# Patient Record
Sex: MALE | Race: WHITE | NOT HISPANIC OR LATINO | Employment: OTHER | URBAN - METROPOLITAN AREA
[De-identification: names, ages, dates, MRNs, and addresses within clinical notes are randomized per-mention and may not be internally consistent; named-entity substitution may affect disease eponyms.]

---

## 2021-01-01 ENCOUNTER — APPOINTMENT (EMERGENCY)
Dept: RADIOLOGY | Facility: HOSPITAL | Age: 86
DRG: 071 | End: 2021-01-01
Payer: MEDICARE

## 2021-01-01 ENCOUNTER — APPOINTMENT (INPATIENT)
Dept: RADIOLOGY | Facility: HOSPITAL | Age: 86
DRG: 551 | End: 2021-01-01
Payer: MEDICARE

## 2021-01-01 ENCOUNTER — OFFICE VISIT (OUTPATIENT)
Dept: NEUROSURGERY | Facility: CLINIC | Age: 86
End: 2021-01-01
Payer: MEDICARE

## 2021-01-01 ENCOUNTER — APPOINTMENT (INPATIENT)
Dept: RADIOLOGY | Facility: HOSPITAL | Age: 86
DRG: 071 | End: 2021-01-01
Payer: MEDICARE

## 2021-01-01 ENCOUNTER — TELEPHONE (OUTPATIENT)
Dept: NEUROLOGY | Facility: CLINIC | Age: 86
End: 2021-01-01

## 2021-01-01 ENCOUNTER — HOSPITAL ENCOUNTER (INPATIENT)
Facility: HOSPITAL | Age: 86
LOS: 7 days | Discharge: NURSING, REHAB OR LONG-TERM CARE | DRG: 071 | End: 2021-12-18
Attending: EMERGENCY MEDICINE | Admitting: INTERNAL MEDICINE
Payer: MEDICARE

## 2021-01-01 ENCOUNTER — TELEPHONE (OUTPATIENT)
Dept: GASTROENTEROLOGY | Facility: CLINIC | Age: 86
End: 2021-01-01

## 2021-01-01 ENCOUNTER — HOSPITAL ENCOUNTER (INPATIENT)
Facility: HOSPITAL | Age: 86
LOS: 5 days | Discharge: NURSING, REHAB OR LONG-TERM CARE | DRG: 551 | End: 2021-08-12
Attending: SURGERY | Admitting: SURGERY
Payer: MEDICARE

## 2021-01-01 ENCOUNTER — APPOINTMENT (OUTPATIENT)
Dept: RADIOLOGY | Facility: CLINIC | Age: 86
End: 2021-01-01
Payer: MEDICARE

## 2021-01-01 ENCOUNTER — APPOINTMENT (INPATIENT)
Dept: NEUROLOGY | Facility: CLINIC | Age: 86
DRG: 071 | End: 2021-01-01
Payer: MEDICARE

## 2021-01-01 ENCOUNTER — APPOINTMENT (INPATIENT)
Dept: RADIOLOGY | Facility: HOSPITAL | Age: 86
DRG: 071 | End: 2021-01-01
Attending: STUDENT IN AN ORGANIZED HEALTH CARE EDUCATION/TRAINING PROGRAM
Payer: MEDICARE

## 2021-01-01 ENCOUNTER — HOSPITAL ENCOUNTER (EMERGENCY)
Facility: HOSPITAL | Age: 86
End: 2021-08-07
Attending: EMERGENCY MEDICINE | Admitting: EMERGENCY MEDICINE
Payer: MEDICARE

## 2021-01-01 ENCOUNTER — TELEPHONE (OUTPATIENT)
Dept: NEUROSURGERY | Facility: CLINIC | Age: 86
End: 2021-01-01

## 2021-01-01 ENCOUNTER — HOSPITAL ENCOUNTER (OUTPATIENT)
Dept: RADIOLOGY | Facility: HOSPITAL | Age: 86
Discharge: HOME | End: 2021-08-20
Payer: COMMERCIAL

## 2021-01-01 ENCOUNTER — APPOINTMENT (EMERGENCY)
Dept: RADIOLOGY | Facility: HOSPITAL | Age: 86
End: 2021-01-01
Payer: MEDICARE

## 2021-01-01 VITALS
DIASTOLIC BLOOD PRESSURE: 73 MMHG | HEART RATE: 93 BPM | WEIGHT: 174.7 LBS | SYSTOLIC BLOOD PRESSURE: 155 MMHG | HEIGHT: 71 IN | OXYGEN SATURATION: 95 % | BODY MASS INDEX: 24.46 KG/M2 | RESPIRATION RATE: 18 BRPM | TEMPERATURE: 97.4 F

## 2021-01-01 VITALS
HEART RATE: 71 BPM | WEIGHT: 175 LBS | DIASTOLIC BLOOD PRESSURE: 72 MMHG | RESPIRATION RATE: 16 BRPM | SYSTOLIC BLOOD PRESSURE: 157 MMHG | HEIGHT: 66 IN | TEMPERATURE: 97.7 F | OXYGEN SATURATION: 94 % | BODY MASS INDEX: 28.12 KG/M2

## 2021-01-01 VITALS
DIASTOLIC BLOOD PRESSURE: 78 MMHG | TEMPERATURE: 98.1 F | RESPIRATION RATE: 16 BRPM | BODY MASS INDEX: 28.25 KG/M2 | HEART RATE: 70 BPM | HEIGHT: 66 IN | SYSTOLIC BLOOD PRESSURE: 132 MMHG

## 2021-01-01 VITALS
WEIGHT: 175 LBS | DIASTOLIC BLOOD PRESSURE: 80 MMHG | HEIGHT: 66 IN | TEMPERATURE: 97.8 F | BODY MASS INDEX: 28.12 KG/M2 | RESPIRATION RATE: 16 BRPM | SYSTOLIC BLOOD PRESSURE: 144 MMHG | HEART RATE: 66 BPM

## 2021-01-01 VITALS
SYSTOLIC BLOOD PRESSURE: 170 MMHG | DIASTOLIC BLOOD PRESSURE: 76 MMHG | HEART RATE: 70 BPM | TEMPERATURE: 98 F | HEIGHT: 66 IN | WEIGHT: 175 LBS | RESPIRATION RATE: 18 BRPM | BODY MASS INDEX: 28.12 KG/M2 | OXYGEN SATURATION: 95 %

## 2021-01-01 DIAGNOSIS — K86.2 PANCREATIC CYST: ICD-10-CM

## 2021-01-01 DIAGNOSIS — S12.690D COMPRESSION FRACTURE OF C7 VERTEBRA WITH ROUTINE HEALING, SUBSEQUENT ENCOUNTER: ICD-10-CM

## 2021-01-01 DIAGNOSIS — S12.690A COMPRESSION FRACTURE OF C7 VERTEBRA, INITIAL ENCOUNTER (HCC): ICD-10-CM

## 2021-01-01 DIAGNOSIS — S12.9XXA FRACTURE OF CERVICAL SPINOUS PROCESS (HCC): ICD-10-CM

## 2021-01-01 DIAGNOSIS — S12.401A CLOSED NONDISPLACED FRACTURE OF FIFTH CERVICAL VERTEBRA, UNSPECIFIED FRACTURE MORPHOLOGY, INITIAL ENCOUNTER (HCC): ICD-10-CM
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
## 2021-01-01 DIAGNOSIS — R41.89 COGNITIVE IMPAIRMENT: ICD-10-CM

## 2021-01-01 DIAGNOSIS — R47.81 SLURRED SPEECH: ICD-10-CM

## 2021-01-01 DIAGNOSIS — S12.301D CLOSED NONDISPLACED FRACTURE OF FOURTH CERVICAL VERTEBRA WITH ROUTINE HEALING, UNSPECIFIED FRACTURE MORPHOLOGY, SUBSEQUENT ENCOUNTER: ICD-10-CM

## 2021-01-01 DIAGNOSIS — M25.552 ACUTE PAIN OF LEFT HIP: ICD-10-CM
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
## 2021-01-01 DIAGNOSIS — R91.8 PULMONARY NODULES: ICD-10-CM

## 2021-01-01 DIAGNOSIS — S12.9XXA CERVICAL COMPRESSION FRACTURE (HCC): Primary | ICD-10-CM

## 2021-01-01 DIAGNOSIS — S01.01XA SCALP LACERATION, INITIAL ENCOUNTER: ICD-10-CM

## 2021-01-01 DIAGNOSIS — G93.40 ENCEPHALOPATHY: ICD-10-CM

## 2021-01-01 DIAGNOSIS — S12.390A OTHER CLOSED DISPLACED FRACTURE OF FOURTH CERVICAL VERTEBRA, INITIAL ENCOUNTER (HCC): ICD-10-CM

## 2021-01-01 DIAGNOSIS — S12.401D CLOSED NONDISPLACED FRACTURE OF FIFTH CERVICAL VERTEBRA WITH ROUTINE HEALING, UNSPECIFIED FRACTURE MORPHOLOGY, SUBSEQUENT ENCOUNTER: ICD-10-CM

## 2021-01-01 DIAGNOSIS — S12.301D CLOSED NONDISPLACED FRACTURE OF FOURTH CERVICAL VERTEBRA WITH ROUTINE HEALING, UNSPECIFIED FRACTURE MORPHOLOGY, SUBSEQUENT ENCOUNTER: Primary | ICD-10-CM

## 2021-01-01 DIAGNOSIS — S12.591D OTHER CLOSED NONDISPLACED FRACTURE OF SIXTH CERVICAL VERTEBRA WITH ROUTINE HEALING, SUBSEQUENT ENCOUNTER: ICD-10-CM

## 2021-01-01 DIAGNOSIS — R26.2 AMBULATORY DYSFUNCTION: ICD-10-CM

## 2021-01-01 DIAGNOSIS — S09.90XA INJURY OF HEAD, INITIAL ENCOUNTER: ICD-10-CM

## 2021-01-01 DIAGNOSIS — S12.400A: ICD-10-CM

## 2021-01-01 DIAGNOSIS — N17.9 AKI (ACUTE KIDNEY INJURY) (HCC): Primary | ICD-10-CM

## 2021-01-01 DIAGNOSIS — S12.9XXA: Primary | ICD-10-CM

## 2021-01-01 LAB
2HR DELTA HS TROPONIN: 0 NG/L
4HR DELTA HS TROPONIN: 42 NG/L
ALBUMIN SERPL BCP-MCNC: 2.7 G/DL (ref 3.5–5)
ALBUMIN SERPL BCP-MCNC: 2.8 G/DL (ref 3.5–5)
ALBUMIN SERPL BCP-MCNC: 3 G/DL (ref 3.5–5)
ALP SERPL-CCNC: 71 U/L (ref 46–116)
ALT SERPL W P-5'-P-CCNC: 35 U/L (ref 12–78)
ANION GAP SERPL CALCULATED.3IONS-SCNC: 6 MMOL/L (ref 4–13)
ANION GAP SERPL CALCULATED.3IONS-SCNC: 7 MMOL/L (ref 4–13)
ANION GAP SERPL CALCULATED.3IONS-SCNC: 8 MMOL/L (ref 4–13)
APTT PPP: 29 SECONDS (ref 23–37)
AST SERPL W P-5'-P-CCNC: 39 U/L (ref 5–45)
ATRIAL RATE: 68 BPM
ATRIAL RATE: 77 BPM
ATRIAL RATE: 81 BPM
BACTERIA UR QL AUTO: ABNORMAL /HPF
BASOPHILS # BLD AUTO: 0.04 THOUSANDS/ΜL (ref 0–0.1)
BASOPHILS # BLD AUTO: 0.05 THOUSANDS/ΜL (ref 0–0.1)
BASOPHILS # BLD AUTO: 0.05 THOUSANDS/ΜL (ref 0–0.1)
BASOPHILS # BLD AUTO: 0.07 THOUSANDS/ΜL (ref 0–0.1)
BASOPHILS # BLD AUTO: 0.07 THOUSANDS/ΜL (ref 0–0.1)
BASOPHILS # BLD AUTO: 0.08 THOUSANDS/ΜL (ref 0–0.1)
BASOPHILS NFR BLD AUTO: 1 % (ref 0–1)
BILIRUB SERPL-MCNC: 0.61 MG/DL (ref 0.2–1)
BILIRUB UR QL STRIP: NEGATIVE
BUN SERPL-MCNC: 17 MG/DL (ref 5–25)
BUN SERPL-MCNC: 19 MG/DL (ref 5–25)
BUN SERPL-MCNC: 20 MG/DL (ref 5–25)
BUN SERPL-MCNC: 21 MG/DL (ref 5–25)
BUN SERPL-MCNC: 25 MG/DL (ref 5–25)
BUN SERPL-MCNC: 27 MG/DL (ref 5–25)
CALCIUM ALBUM COR SERPL-MCNC: 10 MG/DL (ref 8.3–10.1)
CALCIUM ALBUM COR SERPL-MCNC: 10.5 MG/DL (ref 8.3–10.1)
CALCIUM ALBUM COR SERPL-MCNC: 10.7 MG/DL (ref 8.3–10.1)
CALCIUM SERPL-MCNC: 10.3 MG/DL (ref 8.3–10.1)
CALCIUM SERPL-MCNC: 8.9 MG/DL (ref 8.3–10.1)
CALCIUM SERPL-MCNC: 9 MG/DL (ref 8.3–10.1)
CALCIUM SERPL-MCNC: 9 MG/DL (ref 8.3–10.1)
CALCIUM SERPL-MCNC: 9.2 MG/DL (ref 8.3–10.1)
CALCIUM SERPL-MCNC: 9.4 MG/DL (ref 8.3–10.1)
CALCIUM SERPL-MCNC: 9.7 MG/DL (ref 8.3–10.1)
CALCIUM SERPL-MCNC: 9.7 MG/DL (ref 8.3–10.1)
CARDIAC TROPONIN I PNL SERPL HS: 13 NG/L
CARDIAC TROPONIN I PNL SERPL HS: 13 NG/L
CARDIAC TROPONIN I PNL SERPL HS: 55 NG/L
CARDIAC TROPONIN I PNL SERPL HS: 56 NG/L (ref 8–18)
CHLORIDE SERPL-SCNC: 104 MMOL/L (ref 100–108)
CHLORIDE SERPL-SCNC: 107 MMOL/L (ref 100–108)
CHLORIDE SERPL-SCNC: 108 MMOL/L (ref 100–108)
CHLORIDE SERPL-SCNC: 109 MMOL/L (ref 100–108)
CHLORIDE SERPL-SCNC: 109 MMOL/L (ref 100–108)
CHLORIDE SERPL-SCNC: 112 MMOL/L (ref 100–108)
CK MB SERPL-MCNC: 1.3 % (ref 0–2.5)
CK MB SERPL-MCNC: 2.3 % (ref 0–2.5)
CK MB SERPL-MCNC: 2.3 NG/ML (ref 0–5)
CK MB SERPL-MCNC: 6.5 NG/ML (ref 0–5)
CK SERPL-CCNC: 171 U/L (ref 39–308)
CK SERPL-CCNC: 286 U/L (ref 39–308)
CLARITY UR: CLEAR
CO2 SERPL-SCNC: 23 MMOL/L (ref 21–32)
CO2 SERPL-SCNC: 23 MMOL/L (ref 21–32)
CO2 SERPL-SCNC: 24 MMOL/L (ref 21–32)
CO2 SERPL-SCNC: 25 MMOL/L (ref 21–32)
CO2 SERPL-SCNC: 25 MMOL/L (ref 21–32)
CO2 SERPL-SCNC: 26 MMOL/L (ref 21–32)
CO2 SERPL-SCNC: 26 MMOL/L (ref 21–32)
CO2 SERPL-SCNC: 27 MMOL/L (ref 21–32)
COLOR UR: YELLOW
COLOR, POC: YELLOW
CREAT SERPL-MCNC: 1.12 MG/DL (ref 0.6–1.3)
CREAT SERPL-MCNC: 1.16 MG/DL (ref 0.6–1.3)
CREAT SERPL-MCNC: 1.17 MG/DL (ref 0.6–1.3)
CREAT SERPL-MCNC: 1.21 MG/DL (ref 0.6–1.3)
CREAT SERPL-MCNC: 1.23 MG/DL (ref 0.6–1.3)
CREAT SERPL-MCNC: 1.24 MG/DL (ref 0.6–1.3)
CREAT SERPL-MCNC: 1.29 MG/DL (ref 0.6–1.3)
CREAT SERPL-MCNC: 1.56 MG/DL (ref 0.6–1.3)
EOSINOPHIL # BLD AUTO: 0.03 THOUSAND/ΜL (ref 0–0.61)
EOSINOPHIL # BLD AUTO: 0.15 THOUSAND/ΜL (ref 0–0.61)
EOSINOPHIL # BLD AUTO: 0.18 THOUSAND/ΜL (ref 0–0.61)
EOSINOPHIL # BLD AUTO: 0.28 THOUSAND/ΜL (ref 0–0.61)
EOSINOPHIL # BLD AUTO: 0.3 THOUSAND/ΜL (ref 0–0.61)
EOSINOPHIL # BLD AUTO: 0.46 THOUSAND/ΜL (ref 0–0.61)
EOSINOPHIL NFR BLD AUTO: 0 % (ref 0–6)
EOSINOPHIL NFR BLD AUTO: 1 % (ref 0–6)
EOSINOPHIL NFR BLD AUTO: 2 % (ref 0–6)
EOSINOPHIL NFR BLD AUTO: 3 % (ref 0–6)
EOSINOPHIL NFR BLD AUTO: 4 % (ref 0–6)
EOSINOPHIL NFR BLD AUTO: 5 % (ref 0–6)
ERYTHROCYTE [DISTWIDTH] IN BLOOD BY AUTOMATED COUNT: 12.2 % (ref 11.6–15.1)
ERYTHROCYTE [DISTWIDTH] IN BLOOD BY AUTOMATED COUNT: 12.3 % (ref 11.6–15.1)
ERYTHROCYTE [DISTWIDTH] IN BLOOD BY AUTOMATED COUNT: 12.4 % (ref 11.6–15.1)
ERYTHROCYTE [DISTWIDTH] IN BLOOD BY AUTOMATED COUNT: 12.7 % (ref 11.6–15.1)
ERYTHROCYTE [DISTWIDTH] IN BLOOD BY AUTOMATED COUNT: 13.1 % (ref 11.6–15.1)
ERYTHROCYTE [DISTWIDTH] IN BLOOD BY AUTOMATED COUNT: 13.2 % (ref 11.6–15.1)
ERYTHROCYTE [DISTWIDTH] IN BLOOD BY AUTOMATED COUNT: 13.2 % (ref 11.6–15.1)
EST. AVERAGE GLUCOSE BLD GHB EST-MCNC: 143 MG/DL
FLUAV RNA RESP QL NAA+PROBE: NEGATIVE
FLUBV RNA RESP QL NAA+PROBE: NEGATIVE
FOLATE SERPL-MCNC: >20 NG/ML (ref 3.1–17.5)
GFR SERPL CREATININE-BSD FRML MDRD: 38 ML/MIN/1.73SQ M
GFR SERPL CREATININE-BSD FRML MDRD: 48 ML/MIN/1.73SQ M
GFR SERPL CREATININE-BSD FRML MDRD: 50 ML/MIN/1.73SQ M
GFR SERPL CREATININE-BSD FRML MDRD: 50 ML/MIN/1.73SQ M
GFR SERPL CREATININE-BSD FRML MDRD: 52 ML/MIN/1.73SQ M
GFR SERPL CREATININE-BSD FRML MDRD: 53 ML/MIN/1.73SQ M
GFR SERPL CREATININE-BSD FRML MDRD: 54 ML/MIN/1.73SQ M
GFR SERPL CREATININE-BSD FRML MDRD: 57 ML/MIN/1.73SQ M
GLUCOSE SERPL-MCNC: 111 MG/DL (ref 65–140)
GLUCOSE SERPL-MCNC: 116 MG/DL (ref 65–140)
GLUCOSE SERPL-MCNC: 117 MG/DL (ref 65–140)
GLUCOSE SERPL-MCNC: 117 MG/DL (ref 65–140)
GLUCOSE SERPL-MCNC: 122 MG/DL (ref 65–140)
GLUCOSE SERPL-MCNC: 122 MG/DL (ref 65–140)
GLUCOSE SERPL-MCNC: 124 MG/DL (ref 65–140)
GLUCOSE SERPL-MCNC: 125 MG/DL (ref 65–140)
GLUCOSE SERPL-MCNC: 130 MG/DL (ref 65–140)
GLUCOSE SERPL-MCNC: 131 MG/DL (ref 65–140)
GLUCOSE SERPL-MCNC: 131 MG/DL (ref 65–140)
GLUCOSE SERPL-MCNC: 139 MG/DL (ref 65–140)
GLUCOSE SERPL-MCNC: 140 MG/DL (ref 65–140)
GLUCOSE SERPL-MCNC: 141 MG/DL (ref 65–140)
GLUCOSE SERPL-MCNC: 142 MG/DL (ref 65–140)
GLUCOSE SERPL-MCNC: 144 MG/DL (ref 65–140)
GLUCOSE SERPL-MCNC: 145 MG/DL (ref 65–140)
GLUCOSE SERPL-MCNC: 147 MG/DL (ref 65–140)
GLUCOSE SERPL-MCNC: 148 MG/DL (ref 65–140)
GLUCOSE SERPL-MCNC: 151 MG/DL (ref 65–140)
GLUCOSE SERPL-MCNC: 152 MG/DL (ref 65–140)
GLUCOSE SERPL-MCNC: 153 MG/DL (ref 65–140)
GLUCOSE SERPL-MCNC: 154 MG/DL (ref 65–140)
GLUCOSE SERPL-MCNC: 157 MG/DL (ref 65–140)
GLUCOSE SERPL-MCNC: 158 MG/DL (ref 65–140)
GLUCOSE SERPL-MCNC: 161 MG/DL (ref 65–140)
GLUCOSE SERPL-MCNC: 162 MG/DL (ref 65–140)
GLUCOSE SERPL-MCNC: 168 MG/DL (ref 65–140)
GLUCOSE SERPL-MCNC: 174 MG/DL (ref 65–140)
GLUCOSE SERPL-MCNC: 175 MG/DL (ref 65–140)
GLUCOSE SERPL-MCNC: 178 MG/DL (ref 65–140)
GLUCOSE SERPL-MCNC: 179 MG/DL (ref 65–140)
GLUCOSE SERPL-MCNC: 181 MG/DL (ref 65–140)
GLUCOSE SERPL-MCNC: 183 MG/DL (ref 65–140)
GLUCOSE SERPL-MCNC: 183 MG/DL (ref 65–140)
GLUCOSE SERPL-MCNC: 199 MG/DL (ref 65–140)
GLUCOSE SERPL-MCNC: 202 MG/DL (ref 65–140)
GLUCOSE SERPL-MCNC: 210 MG/DL (ref 65–140)
GLUCOSE SERPL-MCNC: 212 MG/DL (ref 65–140)
GLUCOSE SERPL-MCNC: 212 MG/DL (ref 65–140)
GLUCOSE SERPL-MCNC: 216 MG/DL (ref 65–140)
GLUCOSE SERPL-MCNC: 222 MG/DL (ref 65–140)
GLUCOSE SERPL-MCNC: 228 MG/DL (ref 65–140)
GLUCOSE SERPL-MCNC: 230 MG/DL (ref 65–140)
GLUCOSE SERPL-MCNC: 231 MG/DL (ref 65–140)
GLUCOSE SERPL-MCNC: 248 MG/DL (ref 65–140)
GLUCOSE SERPL-MCNC: 253 MG/DL (ref 65–140)
GLUCOSE SERPL-MCNC: 88 MG/DL (ref 65–140)
GLUCOSE SERPL-MCNC: 97 MG/DL (ref 65–140)
GLUCOSE SERPL-MCNC: 97 MG/DL (ref 65–140)
GLUCOSE UR STRIP-MCNC: NEGATIVE MG/DL
HBA1C MFR BLD: 6.6 %
HCT VFR BLD AUTO: 30.5 % (ref 36.5–49.3)
HCT VFR BLD AUTO: 30.9 % (ref 36.5–49.3)
HCT VFR BLD AUTO: 32.6 % (ref 36.5–49.3)
HCT VFR BLD AUTO: 34.6 % (ref 36.5–49.3)
HCT VFR BLD AUTO: 34.8 % (ref 36.5–49.3)
HCT VFR BLD AUTO: 37.2 % (ref 36.5–49.3)
HCT VFR BLD AUTO: 38 % (ref 36.5–49.3)
HGB BLD-MCNC: 10.5 G/DL (ref 12–17)
HGB BLD-MCNC: 10.6 G/DL (ref 12–17)
HGB BLD-MCNC: 11.3 G/DL (ref 12–17)
HGB BLD-MCNC: 11.4 G/DL (ref 12–17)
HGB BLD-MCNC: 12.2 G/DL (ref 12–17)
HGB BLD-MCNC: 12.7 G/DL (ref 12–17)
HGB BLD-MCNC: 12.9 G/DL (ref 12–17)
HGB UR QL STRIP.AUTO: ABNORMAL
HYALINE CASTS #/AREA URNS LPF: ABNORMAL /LPF
IMM GRANULOCYTES # BLD AUTO: 0.05 THOUSAND/UL (ref 0–0.2)
IMM GRANULOCYTES # BLD AUTO: 0.05 THOUSAND/UL (ref 0–0.2)
IMM GRANULOCYTES # BLD AUTO: 0.06 THOUSAND/UL (ref 0–0.2)
IMM GRANULOCYTES # BLD AUTO: 0.08 THOUSAND/UL (ref 0–0.2)
IMM GRANULOCYTES # BLD AUTO: 0.08 THOUSAND/UL (ref 0–0.2)
IMM GRANULOCYTES # BLD AUTO: 0.09 THOUSAND/UL (ref 0–0.2)
IMM GRANULOCYTES NFR BLD AUTO: 1 % (ref 0–2)
INR PPP: 0.97 (ref 0.84–1.19)
KETONES UR STRIP-MCNC: NEGATIVE MG/DL
LEUKOCYTE ESTERASE UR QL STRIP: NEGATIVE
LYMPHOCYTES # BLD AUTO: 0.66 THOUSANDS/ΜL (ref 0.6–4.47)
LYMPHOCYTES # BLD AUTO: 0.85 THOUSANDS/ΜL (ref 0.6–4.47)
LYMPHOCYTES # BLD AUTO: 0.99 THOUSANDS/ΜL (ref 0.6–4.47)
LYMPHOCYTES # BLD AUTO: 1.09 THOUSANDS/ΜL (ref 0.6–4.47)
LYMPHOCYTES # BLD AUTO: 1.32 THOUSANDS/ΜL (ref 0.6–4.47)
LYMPHOCYTES # BLD AUTO: 1.39 THOUSANDS/ΜL (ref 0.6–4.47)
LYMPHOCYTES NFR BLD AUTO: 11 % (ref 14–44)
LYMPHOCYTES NFR BLD AUTO: 11 % (ref 14–44)
LYMPHOCYTES NFR BLD AUTO: 12 % (ref 14–44)
LYMPHOCYTES NFR BLD AUTO: 12 % (ref 14–44)
LYMPHOCYTES NFR BLD AUTO: 15 % (ref 14–44)
LYMPHOCYTES NFR BLD AUTO: 5 % (ref 14–44)
MAGNESIUM SERPL-MCNC: 2.2 MG/DL (ref 1.6–2.6)
MAGNESIUM SERPL-MCNC: 2.3 MG/DL (ref 1.6–2.6)
MAGNESIUM SERPL-MCNC: 2.4 MG/DL (ref 1.6–2.6)
MCH RBC QN AUTO: 30.3 PG (ref 26.8–34.3)
MCH RBC QN AUTO: 30.5 PG (ref 26.8–34.3)
MCH RBC QN AUTO: 30.7 PG (ref 26.8–34.3)
MCH RBC QN AUTO: 31 PG (ref 26.8–34.3)
MCH RBC QN AUTO: 31.3 PG (ref 26.8–34.3)
MCH RBC QN AUTO: 31.4 PG (ref 26.8–34.3)
MCH RBC QN AUTO: 32 PG (ref 26.8–34.3)
MCHC RBC AUTO-ENTMCNC: 32.5 G/DL (ref 31.4–37.4)
MCHC RBC AUTO-ENTMCNC: 33.9 G/DL (ref 31.4–37.4)
MCHC RBC AUTO-ENTMCNC: 34.1 G/DL (ref 31.4–37.4)
MCHC RBC AUTO-ENTMCNC: 34.3 G/DL (ref 31.4–37.4)
MCHC RBC AUTO-ENTMCNC: 34.4 G/DL (ref 31.4–37.4)
MCHC RBC AUTO-ENTMCNC: 35 G/DL (ref 31.4–37.4)
MCHC RBC AUTO-ENTMCNC: 35.3 G/DL (ref 31.4–37.4)
MCV RBC AUTO: 87 FL (ref 82–98)
MCV RBC AUTO: 89 FL (ref 82–98)
MCV RBC AUTO: 89 FL (ref 82–98)
MCV RBC AUTO: 91 FL (ref 82–98)
MCV RBC AUTO: 92 FL (ref 82–98)
MCV RBC AUTO: 92 FL (ref 82–98)
MCV RBC AUTO: 95 FL (ref 82–98)
MONOCYTES # BLD AUTO: 0.73 THOUSAND/ΜL (ref 0.17–1.22)
MONOCYTES # BLD AUTO: 0.88 THOUSAND/ΜL (ref 0.17–1.22)
MONOCYTES # BLD AUTO: 0.88 THOUSAND/ΜL (ref 0.17–1.22)
MONOCYTES # BLD AUTO: 0.9 THOUSAND/ΜL (ref 0.17–1.22)
MONOCYTES # BLD AUTO: 0.9 THOUSAND/ΜL (ref 0.17–1.22)
MONOCYTES # BLD AUTO: 0.91 THOUSAND/ΜL (ref 0.17–1.22)
MONOCYTES NFR BLD AUTO: 10 % (ref 4–12)
MONOCYTES NFR BLD AUTO: 12 % (ref 4–12)
MONOCYTES NFR BLD AUTO: 7 % (ref 4–12)
MONOCYTES NFR BLD AUTO: 8 % (ref 4–12)
MONOCYTES NFR BLD AUTO: 9 % (ref 4–12)
MONOCYTES NFR BLD AUTO: 9 % (ref 4–12)
NEUTROPHILS # BLD AUTO: 11.2 THOUSANDS/ΜL (ref 1.85–7.62)
NEUTROPHILS # BLD AUTO: 5.67 THOUSANDS/ΜL (ref 1.85–7.62)
NEUTROPHILS # BLD AUTO: 6.2 THOUSANDS/ΜL (ref 1.85–7.62)
NEUTROPHILS # BLD AUTO: 6.37 THOUSANDS/ΜL (ref 1.85–7.62)
NEUTROPHILS # BLD AUTO: 8.16 THOUSANDS/ΜL (ref 1.85–7.62)
NEUTROPHILS # BLD AUTO: 8.27 THOUSANDS/ΜL (ref 1.85–7.62)
NEUTS SEG NFR BLD AUTO: 68 % (ref 43–75)
NEUTS SEG NFR BLD AUTO: 71 % (ref 43–75)
NEUTS SEG NFR BLD AUTO: 74 % (ref 43–75)
NEUTS SEG NFR BLD AUTO: 76 % (ref 43–75)
NEUTS SEG NFR BLD AUTO: 77 % (ref 43–75)
NEUTS SEG NFR BLD AUTO: 86 % (ref 43–75)
NITRITE UR QL STRIP: NEGATIVE
NON-SQ EPI CELLS URNS QL MICRO: ABNORMAL /HPF
NRBC BLD AUTO-RTO: 0 /100 WBCS
P AXIS: 15 DEGREES
P AXIS: 63 DEGREES
P AXIS: 68 DEGREES
PH UR STRIP.AUTO: 7 [PH] (ref 4.5–8)
PHOSPHATE SERPL-MCNC: 2.1 MG/DL (ref 2.3–4.1)
PHOSPHATE SERPL-MCNC: 2.3 MG/DL (ref 2.3–4.1)
PLATELET # BLD AUTO: 175 THOUSANDS/UL (ref 149–390)
PLATELET # BLD AUTO: 180 THOUSANDS/UL (ref 149–390)
PLATELET # BLD AUTO: 207 THOUSANDS/UL (ref 149–390)
PLATELET # BLD AUTO: 210 THOUSANDS/UL (ref 149–390)
PLATELET # BLD AUTO: 212 THOUSANDS/UL (ref 149–390)
PLATELET # BLD AUTO: 219 THOUSANDS/UL (ref 149–390)
PLATELET # BLD AUTO: 243 THOUSANDS/UL (ref 149–390)
PLATELET # BLD AUTO: 257 THOUSANDS/UL (ref 149–390)
PLATELET # BLD AUTO: 264 THOUSANDS/UL (ref 149–390)
PMV BLD AUTO: 10.1 FL (ref 8.9–12.7)
PMV BLD AUTO: 10.2 FL (ref 8.9–12.7)
PMV BLD AUTO: 10.6 FL (ref 8.9–12.7)
PMV BLD AUTO: 10.6 FL (ref 8.9–12.7)
PMV BLD AUTO: 11.5 FL (ref 8.9–12.7)
PMV BLD AUTO: 9.6 FL (ref 8.9–12.7)
PMV BLD AUTO: 9.6 FL (ref 8.9–12.7)
PMV BLD AUTO: 9.8 FL (ref 8.9–12.7)
PMV BLD AUTO: 9.9 FL (ref 8.9–12.7)
POTASSIUM SERPL-SCNC: 3.1 MMOL/L (ref 3.5–5.3)
POTASSIUM SERPL-SCNC: 3.2 MMOL/L (ref 3.5–5.3)
POTASSIUM SERPL-SCNC: 3.4 MMOL/L (ref 3.5–5.3)
POTASSIUM SERPL-SCNC: 3.6 MMOL/L (ref 3.5–5.3)
POTASSIUM SERPL-SCNC: 3.6 MMOL/L (ref 3.5–5.3)
POTASSIUM SERPL-SCNC: 3.7 MMOL/L (ref 3.5–5.3)
POTASSIUM SERPL-SCNC: 3.9 MMOL/L (ref 3.5–5.3)
POTASSIUM SERPL-SCNC: 4.3 MMOL/L (ref 3.5–5.3)
PR INTERVAL: 228 MS
PR INTERVAL: 240 MS
PR INTERVAL: 250 MS
PROT SERPL-MCNC: 6.2 G/DL (ref 6.4–8.2)
PROT UR STRIP-MCNC: ABNORMAL MG/DL
PROTHROMBIN TIME: 12.7 SECONDS (ref 11.6–14.5)
QRS AXIS: -5 DEGREES
QRS AXIS: 10 DEGREES
QRS AXIS: 4 DEGREES
QRSD INTERVAL: 88 MS
QRSD INTERVAL: 90 MS
QRSD INTERVAL: 92 MS
QT INTERVAL: 364 MS
QT INTERVAL: 368 MS
QT INTERVAL: 398 MS
QTC INTERVAL: 416 MS
QTC INTERVAL: 422 MS
QTC INTERVAL: 423 MS
RBC # BLD AUTO: 3.39 MILLION/UL (ref 3.88–5.62)
RBC # BLD AUTO: 3.44 MILLION/UL (ref 3.88–5.62)
RBC # BLD AUTO: 3.56 MILLION/UL (ref 3.88–5.62)
RBC # BLD AUTO: 3.65 MILLION/UL (ref 3.88–5.62)
RBC # BLD AUTO: 3.97 MILLION/UL (ref 3.88–5.62)
RBC # BLD AUTO: 4.05 MILLION/UL (ref 3.88–5.62)
RBC # BLD AUTO: 4.26 MILLION/UL (ref 3.88–5.62)
RBC #/AREA URNS AUTO: ABNORMAL /HPF
RSV RNA RESP QL NAA+PROBE: NEGATIVE
SARS-COV-2 RNA RESP QL NAA+PROBE: NEGATIVE
SODIUM SERPL-SCNC: 136 MMOL/L (ref 136–145)
SODIUM SERPL-SCNC: 139 MMOL/L (ref 136–145)
SODIUM SERPL-SCNC: 140 MMOL/L (ref 136–145)
SODIUM SERPL-SCNC: 140 MMOL/L (ref 136–145)
SODIUM SERPL-SCNC: 141 MMOL/L (ref 136–145)
SODIUM SERPL-SCNC: 142 MMOL/L (ref 136–145)
SP GR UR STRIP.AUTO: >=1.03 (ref 1–1.03)
T WAVE AXIS: 18 DEGREES
T WAVE AXIS: 36 DEGREES
T WAVE AXIS: 58 DEGREES
TROPONIN I SERPL-MCNC: <0.02 NG/ML
TSH SERPL DL<=0.05 MIU/L-ACNC: 1.73 UIU/ML (ref 0.36–3.74)
TSH SERPL DL<=0.05 MIU/L-ACNC: 2.45 UIU/ML (ref 0.36–3.74)
UROBILINOGEN UR QL STRIP.AUTO: 0.2 E.U./DL
VENTRICULAR RATE: 68 BPM
VENTRICULAR RATE: 77 BPM
VENTRICULAR RATE: 81 BPM
VIT B12 SERPL-MCNC: 1269 PG/ML (ref 100–900)
WBC # BLD AUTO: 10.41 THOUSAND/UL (ref 4.31–10.16)
WBC # BLD AUTO: 10.79 THOUSAND/UL (ref 4.31–10.16)
WBC # BLD AUTO: 12.9 THOUSAND/UL (ref 4.31–10.16)
WBC # BLD AUTO: 7.31 THOUSAND/UL (ref 4.31–10.16)
WBC # BLD AUTO: 7.82 THOUSAND/UL (ref 4.31–10.16)
WBC # BLD AUTO: 8.5 THOUSAND/UL (ref 4.31–10.16)
WBC # BLD AUTO: 9.12 THOUSAND/UL (ref 4.31–10.16)
WBC #/AREA URNS AUTO: ABNORMAL /HPF

## 2021-01-01 PROCEDURE — 99232 SBSQ HOSP IP/OBS MODERATE 35: CPT | Performed by: INTERNAL MEDICINE

## 2021-01-01 PROCEDURE — 0241U HB NFCT DS VIR RESP RNA 4 TRGT: CPT | Performed by: STUDENT IN AN ORGANIZED HEALTH CARE EDUCATION/TRAINING PROGRAM

## 2021-01-01 PROCEDURE — 99213 OFFICE O/P EST LOW 20 MIN: CPT | Performed by: PHYSICIAN ASSISTANT

## 2021-01-01 PROCEDURE — 92610 EVALUATE SWALLOWING FUNCTION: CPT

## 2021-01-01 PROCEDURE — 99232 SBSQ HOSP IP/OBS MODERATE 35: CPT | Performed by: STUDENT IN AN ORGANIZED HEALTH CARE EDUCATION/TRAINING PROGRAM

## 2021-01-01 PROCEDURE — 76770 US EXAM ABDO BACK WALL COMP: CPT

## 2021-01-01 PROCEDURE — 97535 SELF CARE MNGMENT TRAINING: CPT

## 2021-01-01 PROCEDURE — 80053 COMPREHEN METABOLIC PANEL: CPT | Performed by: STUDENT IN AN ORGANIZED HEALTH CARE EDUCATION/TRAINING PROGRAM

## 2021-01-01 PROCEDURE — 99285 EMERGENCY DEPT VISIT HI MDM: CPT | Performed by: PHYSICIAN ASSISTANT

## 2021-01-01 PROCEDURE — 99232 SBSQ HOSP IP/OBS MODERATE 35: CPT | Performed by: NURSE PRACTITIONER

## 2021-01-01 PROCEDURE — 81001 URINALYSIS AUTO W/SCOPE: CPT

## 2021-01-01 PROCEDURE — 82948 REAGENT STRIP/BLOOD GLUCOSE: CPT

## 2021-01-01 PROCEDURE — 71250 CT THORAX DX C-: CPT

## 2021-01-01 PROCEDURE — 99232 SBSQ HOSP IP/OBS MODERATE 35: CPT | Performed by: PHYSICIAN ASSISTANT

## 2021-01-01 PROCEDURE — 83735 ASSAY OF MAGNESIUM: CPT | Performed by: STUDENT IN AN ORGANIZED HEALTH CARE EDUCATION/TRAINING PROGRAM

## 2021-01-01 PROCEDURE — 97112 NEUROMUSCULAR REEDUCATION: CPT

## 2021-01-01 PROCEDURE — 96374 THER/PROPH/DIAG INJ IV PUSH: CPT

## 2021-01-01 PROCEDURE — 97530 THERAPEUTIC ACTIVITIES: CPT

## 2021-01-01 PROCEDURE — 72125 CT NECK SPINE W/O DYE: CPT

## 2021-01-01 PROCEDURE — 85025 COMPLETE CBC W/AUTO DIFF WBC: CPT | Performed by: INTERNAL MEDICINE

## 2021-01-01 PROCEDURE — 82550 ASSAY OF CK (CPK): CPT | Performed by: EMERGENCY MEDICINE

## 2021-01-01 PROCEDURE — 97163 PT EVAL HIGH COMPLEX 45 MIN: CPT

## 2021-01-01 PROCEDURE — 99223 1ST HOSP IP/OBS HIGH 75: CPT | Performed by: INTERNAL MEDICINE

## 2021-01-01 PROCEDURE — 99285 EMERGENCY DEPT VISIT HI MDM: CPT

## 2021-01-01 PROCEDURE — 71046 X-RAY EXAM CHEST 2 VIEWS: CPT

## 2021-01-01 PROCEDURE — 84443 ASSAY THYROID STIM HORMONE: CPT | Performed by: INTERNAL MEDICINE

## 2021-01-01 PROCEDURE — 85730 THROMBOPLASTIN TIME PARTIAL: CPT | Performed by: PHYSICIAN ASSISTANT

## 2021-01-01 PROCEDURE — 99232 SBSQ HOSP IP/OBS MODERATE 35: CPT | Performed by: EMERGENCY MEDICINE

## 2021-01-01 PROCEDURE — 97167 OT EVAL HIGH COMPLEX 60 MIN: CPT

## 2021-01-01 PROCEDURE — 80048 BASIC METABOLIC PNL TOTAL CA: CPT | Performed by: EMERGENCY MEDICINE

## 2021-01-01 PROCEDURE — 97110 THERAPEUTIC EXERCISES: CPT

## 2021-01-01 PROCEDURE — 99239 HOSP IP/OBS DSCHRG MGMT >30: CPT | Performed by: STUDENT IN AN ORGANIZED HEALTH CARE EDUCATION/TRAINING PROGRAM

## 2021-01-01 PROCEDURE — 99222 1ST HOSP IP/OBS MODERATE 55: CPT | Performed by: SURGERY

## 2021-01-01 PROCEDURE — NC001 PR NO CHARGE: Performed by: INTERNAL MEDICINE

## 2021-01-01 PROCEDURE — 95816 EEG AWAKE AND DROWSY: CPT | Performed by: STUDENT IN AN ORGANIZED HEALTH CARE EDUCATION/TRAINING PROGRAM

## 2021-01-01 PROCEDURE — 90715 TDAP VACCINE 7 YRS/> IM: CPT | Performed by: EMERGENCY MEDICINE

## 2021-01-01 PROCEDURE — 95816 EEG AWAKE AND DROWSY: CPT

## 2021-01-01 PROCEDURE — 82746 ASSAY OF FOLIC ACID SERUM: CPT | Performed by: INTERNAL MEDICINE

## 2021-01-01 PROCEDURE — 93010 ELECTROCARDIOGRAM REPORT: CPT | Performed by: INTERNAL MEDICINE

## 2021-01-01 PROCEDURE — 36415 COLL VENOUS BLD VENIPUNCTURE: CPT | Performed by: EMERGENCY MEDICINE

## 2021-01-01 PROCEDURE — 85730 THROMBOPLASTIN TIME PARTIAL: CPT | Performed by: EMERGENCY MEDICINE

## 2021-01-01 PROCEDURE — 93005 ELECTROCARDIOGRAM TRACING: CPT

## 2021-01-01 PROCEDURE — 97116 GAIT TRAINING THERAPY: CPT

## 2021-01-01 PROCEDURE — 96361 HYDRATE IV INFUSION ADD-ON: CPT

## 2021-01-01 PROCEDURE — 99222 1ST HOSP IP/OBS MODERATE 55: CPT | Performed by: PHYSICIAN ASSISTANT

## 2021-01-01 PROCEDURE — 1124F ACP DISCUSS-NO DSCNMKR DOCD: CPT | Performed by: PHYSICIAN ASSISTANT

## 2021-01-01 PROCEDURE — 73552 X-RAY EXAM OF FEMUR 2/>: CPT

## 2021-01-01 PROCEDURE — 84484 ASSAY OF TROPONIN QUANT: CPT | Performed by: PHYSICIAN ASSISTANT

## 2021-01-01 PROCEDURE — 85027 COMPLETE CBC AUTOMATED: CPT | Performed by: STUDENT IN AN ORGANIZED HEALTH CARE EDUCATION/TRAINING PROGRAM

## 2021-01-01 PROCEDURE — 80048 BASIC METABOLIC PNL TOTAL CA: CPT | Performed by: INTERNAL MEDICINE

## 2021-01-01 PROCEDURE — 85610 PROTHROMBIN TIME: CPT | Performed by: EMERGENCY MEDICINE

## 2021-01-01 PROCEDURE — 99223 1ST HOSP IP/OBS HIGH 75: CPT | Performed by: NURSE PRACTITIONER

## 2021-01-01 PROCEDURE — 70450 CT HEAD/BRAIN W/O DYE: CPT

## 2021-01-01 PROCEDURE — 96375 TX/PRO/DX INJ NEW DRUG ADDON: CPT

## 2021-01-01 PROCEDURE — 36415 COLL VENOUS BLD VENIPUNCTURE: CPT | Performed by: PHYSICIAN ASSISTANT

## 2021-01-01 PROCEDURE — 72040 X-RAY EXAM NECK SPINE 2-3 VW: CPT

## 2021-01-01 PROCEDURE — 85610 PROTHROMBIN TIME: CPT | Performed by: PHYSICIAN ASSISTANT

## 2021-01-01 PROCEDURE — 85025 COMPLETE CBC W/AUTO DIFF WBC: CPT | Performed by: EMERGENCY MEDICINE

## 2021-01-01 PROCEDURE — 82553 CREATINE MB FRACTION: CPT | Performed by: PHYSICIAN ASSISTANT

## 2021-01-01 PROCEDURE — 82550 ASSAY OF CK (CPK): CPT | Performed by: PHYSICIAN ASSISTANT

## 2021-01-01 PROCEDURE — 80069 RENAL FUNCTION PANEL: CPT | Performed by: STUDENT IN AN ORGANIZED HEALTH CARE EDUCATION/TRAINING PROGRAM

## 2021-01-01 PROCEDURE — 80048 BASIC METABOLIC PNL TOTAL CA: CPT | Performed by: SURGERY

## 2021-01-01 PROCEDURE — 85049 AUTOMATED PLATELET COUNT: CPT | Performed by: SURGERY

## 2021-01-01 PROCEDURE — 84484 ASSAY OF TROPONIN QUANT: CPT | Performed by: INTERNAL MEDICINE

## 2021-01-01 PROCEDURE — 85025 COMPLETE CBC W/AUTO DIFF WBC: CPT | Performed by: SURGERY

## 2021-01-01 PROCEDURE — 90471 IMMUNIZATION ADMIN: CPT

## 2021-01-01 PROCEDURE — 80048 BASIC METABOLIC PNL TOTAL CA: CPT | Performed by: PHYSICIAN ASSISTANT

## 2021-01-01 PROCEDURE — 85025 COMPLETE CBC W/AUTO DIFF WBC: CPT | Performed by: PHYSICIAN ASSISTANT

## 2021-01-01 PROCEDURE — 99232 SBSQ HOSP IP/OBS MODERATE 35: CPT | Performed by: SURGERY

## 2021-01-01 PROCEDURE — 96376 TX/PRO/DX INJ SAME DRUG ADON: CPT

## 2021-01-01 PROCEDURE — 84484 ASSAY OF TROPONIN QUANT: CPT | Performed by: EMERGENCY MEDICINE

## 2021-01-01 PROCEDURE — 97166 OT EVAL MOD COMPLEX 45 MIN: CPT

## 2021-01-01 PROCEDURE — 72170 X-RAY EXAM OF PELVIS: CPT

## 2021-01-01 PROCEDURE — 82607 VITAMIN B-12: CPT | Performed by: INTERNAL MEDICINE

## 2021-01-01 PROCEDURE — 73502 X-RAY EXAM HIP UNI 2-3 VIEWS: CPT

## 2021-01-01 PROCEDURE — 82553 CREATINE MB FRACTION: CPT | Performed by: EMERGENCY MEDICINE

## 2021-01-01 PROCEDURE — 99285 EMERGENCY DEPT VISIT HI MDM: CPT | Performed by: EMERGENCY MEDICINE

## 2021-01-01 PROCEDURE — 85049 AUTOMATED PLATELET COUNT: CPT | Performed by: INTERNAL MEDICINE

## 2021-01-01 PROCEDURE — 83036 HEMOGLOBIN GLYCOSYLATED A1C: CPT | Performed by: INTERNAL MEDICINE

## 2021-01-01 RX ORDER — ONDANSETRON 2 MG/ML
4 INJECTION INTRAMUSCULAR; INTRAVENOUS ONCE
Status: COMPLETED | OUTPATIENT
Start: 2021-01-01 | End: 2021-01-01

## 2021-01-01 RX ORDER — METOPROLOL TARTRATE 50 MG/1
50 TABLET, FILM COATED ORAL EVERY 12 HOURS SCHEDULED
Status: DISCONTINUED | OUTPATIENT
Start: 2021-01-01 | End: 2021-01-01 | Stop reason: HOSPADM

## 2021-01-01 RX ORDER — POTASSIUM CHLORIDE 20MEQ/15ML
40 LIQUID (ML) ORAL ONCE
Status: COMPLETED | OUTPATIENT
Start: 2021-01-01 | End: 2021-01-01

## 2021-01-01 RX ORDER — ONDANSETRON 2 MG/ML
4 INJECTION INTRAMUSCULAR; INTRAVENOUS EVERY 6 HOURS PRN
Status: DISCONTINUED | OUTPATIENT
Start: 2021-01-01 | End: 2021-01-01 | Stop reason: HOSPADM

## 2021-01-01 RX ORDER — ONDANSETRON 2 MG/ML
4 INJECTION INTRAMUSCULAR; INTRAVENOUS EVERY 6 HOURS PRN
Status: DISCONTINUED | OUTPATIENT
Start: 2021-01-01 | End: 2021-01-01

## 2021-01-01 RX ORDER — ACETAMINOPHEN 325 MG/1
975 TABLET ORAL ONCE
Status: DISCONTINUED | OUTPATIENT
Start: 2021-01-01 | End: 2021-01-01 | Stop reason: HOSPADM

## 2021-01-01 RX ORDER — HYDRALAZINE HYDROCHLORIDE 20 MG/ML
5 INJECTION INTRAMUSCULAR; INTRAVENOUS EVERY 6 HOURS PRN
Status: DISCONTINUED | OUTPATIENT
Start: 2021-01-01 | End: 2021-01-01 | Stop reason: HOSPADM

## 2021-01-01 RX ORDER — FUROSEMIDE 20 MG/1
20 TABLET ORAL DAILY
COMMUNITY
End: 2022-01-01 | Stop reason: CLARIF

## 2021-01-01 RX ORDER — LANOLIN ALCOHOL/MO/W.PET/CERES
6 CREAM (GRAM) TOPICAL
Qty: 60 TABLET | Refills: 0
Start: 2021-01-01 | End: 2022-01-01

## 2021-01-01 RX ORDER — POLYETHYLENE GLYCOL 3350 17 G/17G
17 POWDER, FOR SOLUTION ORAL DAILY
Status: DISCONTINUED | OUTPATIENT
Start: 2021-01-01 | End: 2021-01-01 | Stop reason: HOSPADM

## 2021-01-01 RX ORDER — FUROSEMIDE 20 MG/1
20 TABLET ORAL DAILY
Status: DISCONTINUED | OUTPATIENT
Start: 2021-01-01 | End: 2021-01-01 | Stop reason: HOSPADM

## 2021-01-01 RX ORDER — AMLODIPINE BESYLATE 10 MG/1
10 TABLET ORAL DAILY
COMMUNITY
Start: 2021-04-26

## 2021-01-01 RX ORDER — LIDOCAINE 50 MG/G
1 PATCH TOPICAL DAILY
Qty: 10 PATCH | Refills: 0
Start: 2021-01-01 | End: 2021-01-01

## 2021-01-01 RX ORDER — IRBESARTAN 150 MG/1
150 TABLET ORAL DAILY
Status: ON HOLD | COMMUNITY
Start: 2021-03-06 | End: 2021-01-01 | Stop reason: ALTCHOICE

## 2021-01-01 RX ORDER — HEPARIN SODIUM 5000 [USP'U]/ML
5000 INJECTION, SOLUTION INTRAVENOUS; SUBCUTANEOUS EVERY 8 HOURS SCHEDULED
Status: DISCONTINUED | OUTPATIENT
Start: 2021-01-01 | End: 2021-01-01 | Stop reason: HOSPADM

## 2021-01-01 RX ORDER — HYDROMORPHONE HCL/PF 1 MG/ML
0.5 SYRINGE (ML) INJECTION ONCE
Status: COMPLETED | OUTPATIENT
Start: 2021-01-01 | End: 2021-01-01

## 2021-01-01 RX ORDER — LOSARTAN POTASSIUM 50 MG/1
50 TABLET ORAL DAILY
Status: DISCONTINUED | OUTPATIENT
Start: 2021-01-01 | End: 2021-01-01 | Stop reason: HOSPADM

## 2021-01-01 RX ORDER — OXYCODONE HYDROCHLORIDE 5 MG/1
2.5 TABLET ORAL EVERY 4 HOURS PRN
Status: DISCONTINUED | OUTPATIENT
Start: 2021-01-01 | End: 2021-01-01 | Stop reason: HOSPADM

## 2021-01-01 RX ORDER — SENNOSIDES 8.6 MG
2 TABLET ORAL DAILY
Status: DISCONTINUED | OUTPATIENT
Start: 2021-01-01 | End: 2021-01-01 | Stop reason: HOSPADM

## 2021-01-01 RX ORDER — ONDANSETRON 2 MG/ML
4 INJECTION INTRAMUSCULAR; INTRAVENOUS EVERY 4 HOURS PRN
Status: DISCONTINUED | OUTPATIENT
Start: 2021-01-01 | End: 2021-01-01 | Stop reason: HOSPADM

## 2021-01-01 RX ORDER — ATORVASTATIN CALCIUM 20 MG/1
20 TABLET, FILM COATED ORAL
Status: DISCONTINUED | OUTPATIENT
Start: 2021-01-01 | End: 2021-01-01 | Stop reason: HOSPADM

## 2021-01-01 RX ORDER — TAMSULOSIN HYDROCHLORIDE 0.4 MG/1
CAPSULE ORAL
COMMUNITY
Start: 2021-03-24

## 2021-01-01 RX ORDER — AMLODIPINE BESYLATE 10 MG/1
10 TABLET ORAL DAILY
Status: DISCONTINUED | OUTPATIENT
Start: 2021-01-01 | End: 2021-01-01 | Stop reason: HOSPADM

## 2021-01-01 RX ORDER — OXYCODONE HYDROCHLORIDE 5 MG/1
5 TABLET ORAL EVERY 4 HOURS PRN
Status: DISCONTINUED | OUTPATIENT
Start: 2021-01-01 | End: 2021-01-01 | Stop reason: HOSPADM

## 2021-01-01 RX ORDER — POTASSIUM CHLORIDE 20 MEQ/1
20 TABLET, EXTENDED RELEASE ORAL
Status: DISPENSED | OUTPATIENT
Start: 2021-01-01 | End: 2021-01-01

## 2021-01-01 RX ORDER — METOPROLOL TARTRATE 50 MG/1
50 TABLET, FILM COATED ORAL EVERY 12 HOURS SCHEDULED
COMMUNITY
Start: 2021-03-06

## 2021-01-01 RX ORDER — ONDANSETRON 2 MG/ML
INJECTION INTRAMUSCULAR; INTRAVENOUS
Status: COMPLETED
Start: 2021-01-01 | End: 2021-01-01

## 2021-01-01 RX ORDER — INSULIN GLARGINE 100 [IU]/ML
5 INJECTION, SOLUTION SUBCUTANEOUS
Status: DISCONTINUED | OUTPATIENT
Start: 2021-01-01 | End: 2021-01-01 | Stop reason: HOSPADM

## 2021-01-01 RX ORDER — ATORVASTATIN CALCIUM 20 MG/1
20 TABLET, FILM COATED ORAL DAILY
Status: DISCONTINUED | OUTPATIENT
Start: 2021-01-01 | End: 2021-01-01 | Stop reason: HOSPADM

## 2021-01-01 RX ORDER — ACETAMINOPHEN 325 MG/1
975 TABLET ORAL EVERY 8 HOURS SCHEDULED
Start: 2021-01-01 | End: 2022-01-01

## 2021-01-01 RX ORDER — HYDROMORPHONE HCL IN WATER/PF 6 MG/30 ML
0.2 PATIENT CONTROLLED ANALGESIA SYRINGE INTRAVENOUS EVERY 4 HOURS PRN
Status: DISCONTINUED | OUTPATIENT
Start: 2021-01-01 | End: 2021-01-01 | Stop reason: HOSPADM

## 2021-01-01 RX ORDER — ACETAMINOPHEN 325 MG/1
975 TABLET ORAL EVERY 8 HOURS SCHEDULED
Status: DISCONTINUED | OUTPATIENT
Start: 2021-01-01 | End: 2021-01-01 | Stop reason: HOSPADM

## 2021-01-01 RX ORDER — LOSARTAN POTASSIUM 50 MG/1
50 TABLET ORAL DAILY
COMMUNITY
End: 2022-01-01

## 2021-01-01 RX ORDER — LIDOCAINE 50 MG/G
1 PATCH TOPICAL DAILY
Status: DISCONTINUED | OUTPATIENT
Start: 2021-01-01 | End: 2021-01-01 | Stop reason: HOSPADM

## 2021-01-01 RX ORDER — HYDRALAZINE HYDROCHLORIDE 20 MG/ML
10 INJECTION INTRAMUSCULAR; INTRAVENOUS EVERY 6 HOURS PRN
Status: DISCONTINUED | OUTPATIENT
Start: 2021-01-01 | End: 2021-01-01 | Stop reason: HOSPADM

## 2021-01-01 RX ORDER — LIDOCAINE HYDROCHLORIDE AND EPINEPHRINE 10; 10 MG/ML; UG/ML
5 INJECTION, SOLUTION INFILTRATION; PERINEURAL ONCE
Status: COMPLETED | OUTPATIENT
Start: 2021-01-01 | End: 2021-01-01

## 2021-01-01 RX ORDER — SENNOSIDES 8.6 MG
17.2 TABLET ORAL DAILY
Start: 2021-01-01 | End: 2022-01-01

## 2021-01-01 RX ORDER — ATORVASTATIN CALCIUM 20 MG/1
20 TABLET, FILM COATED ORAL DAILY
COMMUNITY
Start: 2021-03-31

## 2021-01-01 RX ORDER — TAMSULOSIN HYDROCHLORIDE 0.4 MG/1
0.4 CAPSULE ORAL
Status: DISCONTINUED | OUTPATIENT
Start: 2021-01-01 | End: 2021-01-01 | Stop reason: HOSPADM

## 2021-01-01 RX ORDER — LABETALOL 20 MG/4 ML (5 MG/ML) INTRAVENOUS SYRINGE
10 ONCE
Status: COMPLETED | OUTPATIENT
Start: 2021-01-01 | End: 2021-01-01

## 2021-01-01 RX ORDER — DOCUSATE SODIUM 100 MG/1
100 CAPSULE, LIQUID FILLED ORAL 2 TIMES DAILY
Status: DISCONTINUED | OUTPATIENT
Start: 2021-01-01 | End: 2021-01-01 | Stop reason: HOSPADM

## 2021-01-01 RX ORDER — HYDRALAZINE HYDROCHLORIDE 20 MG/ML
10 INJECTION INTRAMUSCULAR; INTRAVENOUS ONCE
Status: COMPLETED | OUTPATIENT
Start: 2021-01-01 | End: 2021-01-01

## 2021-01-01 RX ORDER — SODIUM CHLORIDE, SODIUM GLUCONATE, SODIUM ACETATE, POTASSIUM CHLORIDE, MAGNESIUM CHLORIDE, SODIUM PHOSPHATE, DIBASIC, AND POTASSIUM PHOSPHATE .53; .5; .37; .037; .03; .012; .00082 G/100ML; G/100ML; G/100ML; G/100ML; G/100ML; G/100ML; G/100ML
75 INJECTION, SOLUTION INTRAVENOUS CONTINUOUS
Status: DISPENSED | OUTPATIENT
Start: 2021-01-01 | End: 2021-01-01

## 2021-01-01 RX ORDER — LANOLIN ALCOHOL/MO/W.PET/CERES
6 CREAM (GRAM) TOPICAL
Status: DISCONTINUED | OUTPATIENT
Start: 2021-01-01 | End: 2021-01-01 | Stop reason: HOSPADM

## 2021-01-01 RX ADMIN — ONDANSETRON 4 MG: 2 INJECTION INTRAMUSCULAR; INTRAVENOUS at 05:01

## 2021-01-01 RX ADMIN — ATORVASTATIN CALCIUM 20 MG: 20 TABLET, FILM COATED ORAL at 19:04

## 2021-01-01 RX ADMIN — HEPARIN SODIUM 5000 UNITS: 5000 INJECTION INTRAVENOUS; SUBCUTANEOUS at 04:21

## 2021-01-01 RX ADMIN — HYDRALAZINE HYDROCHLORIDE 10 MG: 20 INJECTION INTRAMUSCULAR; INTRAVENOUS at 05:01

## 2021-01-01 RX ADMIN — ACETAMINOPHEN 975 MG: 325 TABLET, FILM COATED ORAL at 14:36

## 2021-01-01 RX ADMIN — HEPARIN SODIUM 5000 UNITS: 5000 INJECTION INTRAVENOUS; SUBCUTANEOUS at 13:39

## 2021-01-01 RX ADMIN — METOPROLOL TARTRATE 50 MG: 50 TABLET, FILM COATED ORAL at 21:13

## 2021-01-01 RX ADMIN — HEPARIN SODIUM 5000 UNITS: 5000 INJECTION INTRAVENOUS; SUBCUTANEOUS at 15:11

## 2021-01-01 RX ADMIN — LIDOCAINE 5% 1 PATCH: 700 PATCH TOPICAL at 17:14

## 2021-01-01 RX ADMIN — HEPARIN SODIUM 5000 UNITS: 5000 INJECTION INTRAVENOUS; SUBCUTANEOUS at 13:53

## 2021-01-01 RX ADMIN — HEPARIN SODIUM 5000 UNITS: 5000 INJECTION INTRAVENOUS; SUBCUTANEOUS at 21:13

## 2021-01-01 RX ADMIN — TAMSULOSIN HYDROCHLORIDE 0.4 MG: 0.4 CAPSULE ORAL at 15:59

## 2021-01-01 RX ADMIN — HEPARIN SODIUM 5000 UNITS: 5000 INJECTION INTRAVENOUS; SUBCUTANEOUS at 23:00

## 2021-01-01 RX ADMIN — METOPROLOL TARTRATE 50 MG: 50 TABLET, FILM COATED ORAL at 20:39

## 2021-01-01 RX ADMIN — Medication 6 MG: at 22:48

## 2021-01-01 RX ADMIN — POLYETHYLENE GLYCOL 3350 17 G: 17 POWDER, FOR SOLUTION ORAL at 08:36

## 2021-01-01 RX ADMIN — FUROSEMIDE 20 MG: 20 TABLET ORAL at 09:12

## 2021-01-01 RX ADMIN — ONDANSETRON 4 MG: 2 INJECTION INTRAMUSCULAR; INTRAVENOUS at 13:10

## 2021-01-01 RX ADMIN — ACETAMINOPHEN 975 MG: 325 TABLET, FILM COATED ORAL at 14:03

## 2021-01-01 RX ADMIN — ACETAMINOPHEN 975 MG: 325 TABLET, FILM COATED ORAL at 17:14

## 2021-01-01 RX ADMIN — ATORVASTATIN CALCIUM 20 MG: 20 TABLET, FILM COATED ORAL at 17:48

## 2021-01-01 RX ADMIN — INSULIN LISPRO 1 UNITS: 100 INJECTION, SOLUTION INTRAVENOUS; SUBCUTANEOUS at 15:59

## 2021-01-01 RX ADMIN — METOPROLOL TARTRATE 50 MG: 50 TABLET, FILM COATED ORAL at 21:05

## 2021-01-01 RX ADMIN — ACETAMINOPHEN 975 MG: 325 TABLET, FILM COATED ORAL at 05:31

## 2021-01-01 RX ADMIN — ACETAMINOPHEN 975 MG: 325 TABLET, FILM COATED ORAL at 22:59

## 2021-01-01 RX ADMIN — AMLODIPINE BESYLATE 10 MG: 10 TABLET ORAL at 11:04

## 2021-01-01 RX ADMIN — METOPROLOL TARTRATE 50 MG: 50 TABLET, FILM COATED ORAL at 09:12

## 2021-01-01 RX ADMIN — HEPARIN SODIUM 5000 UNITS: 5000 INJECTION INTRAVENOUS; SUBCUTANEOUS at 14:36

## 2021-01-01 RX ADMIN — ATORVASTATIN CALCIUM 20 MG: 20 TABLET, FILM COATED ORAL at 18:38

## 2021-01-01 RX ADMIN — SODIUM CHLORIDE, SODIUM GLUCONATE, SODIUM ACETATE, POTASSIUM CHLORIDE, MAGNESIUM CHLORIDE, SODIUM PHOSPHATE, DIBASIC, AND POTASSIUM PHOSPHATE 75 ML/HR: .53; .5; .37; .037; .03; .012; .00082 INJECTION, SOLUTION INTRAVENOUS at 23:28

## 2021-01-01 RX ADMIN — LIDOCAINE 5% 1 PATCH: 700 PATCH TOPICAL at 11:58

## 2021-01-01 RX ADMIN — HYDRALAZINE HYDROCHLORIDE 10 MG: 20 INJECTION, SOLUTION INTRAMUSCULAR; INTRAVENOUS at 18:56

## 2021-01-01 RX ADMIN — ACETAMINOPHEN 975 MG: 325 TABLET, FILM COATED ORAL at 23:30

## 2021-01-01 RX ADMIN — METOPROLOL TARTRATE 50 MG: 50 TABLET, FILM COATED ORAL at 21:48

## 2021-01-01 RX ADMIN — ACETAMINOPHEN 975 MG: 325 TABLET, FILM COATED ORAL at 21:24

## 2021-01-01 RX ADMIN — ATORVASTATIN CALCIUM 20 MG: 20 TABLET, FILM COATED ORAL at 17:14

## 2021-01-01 RX ADMIN — FUROSEMIDE 20 MG: 20 TABLET ORAL at 11:58

## 2021-01-01 RX ADMIN — AMLODIPINE BESYLATE 10 MG: 10 TABLET ORAL at 13:14

## 2021-01-01 RX ADMIN — ACETAMINOPHEN 975 MG: 325 TABLET, FILM COATED ORAL at 20:38

## 2021-01-01 RX ADMIN — HEPARIN SODIUM 5000 UNITS: 5000 INJECTION INTRAVENOUS; SUBCUTANEOUS at 22:15

## 2021-01-01 RX ADMIN — POLYETHYLENE GLYCOL 3350 17 G: 17 POWDER, FOR SOLUTION ORAL at 09:09

## 2021-01-01 RX ADMIN — LABETALOL 20 MG/4 ML (5 MG/ML) INTRAVENOUS SYRINGE 10 MG: at 18:12

## 2021-01-01 RX ADMIN — METOPROLOL TARTRATE 50 MG: 50 TABLET, FILM COATED ORAL at 22:48

## 2021-01-01 RX ADMIN — ACETAMINOPHEN 975 MG: 325 TABLET, FILM COATED ORAL at 15:11

## 2021-01-01 RX ADMIN — FUROSEMIDE 20 MG: 20 TABLET ORAL at 09:27

## 2021-01-01 RX ADMIN — HYDROMORPHONE HYDROCHLORIDE 0.5 MG: 1 INJECTION, SOLUTION INTRAMUSCULAR; INTRAVENOUS; SUBCUTANEOUS at 12:15

## 2021-01-01 RX ADMIN — LIDOCAINE 5% 1 PATCH: 700 PATCH TOPICAL at 08:33

## 2021-01-01 RX ADMIN — HEPARIN SODIUM 5000 UNITS: 5000 INJECTION INTRAVENOUS; SUBCUTANEOUS at 20:36

## 2021-01-01 RX ADMIN — DOCUSATE SODIUM 100 MG: 100 CAPSULE ORAL at 09:27

## 2021-01-01 RX ADMIN — INSULIN LISPRO 2 UNITS: 100 INJECTION, SOLUTION INTRAVENOUS; SUBCUTANEOUS at 13:40

## 2021-01-01 RX ADMIN — DOCUSATE SODIUM 100 MG: 100 CAPSULE ORAL at 17:13

## 2021-01-01 RX ADMIN — METOPROLOL TARTRATE 50 MG: 50 TABLET, FILM COATED ORAL at 08:36

## 2021-01-01 RX ADMIN — POLYETHYLENE GLYCOL 3350 17 G: 17 POWDER, FOR SOLUTION ORAL at 09:36

## 2021-01-01 RX ADMIN — SENNOSIDES 17.2 MG: 8.6 TABLET ORAL at 08:36

## 2021-01-01 RX ADMIN — INSULIN LISPRO 1 UNITS: 100 INJECTION, SOLUTION INTRAVENOUS; SUBCUTANEOUS at 21:13

## 2021-01-01 RX ADMIN — INSULIN LISPRO 2 UNITS: 100 INJECTION, SOLUTION INTRAVENOUS; SUBCUTANEOUS at 14:45

## 2021-01-01 RX ADMIN — HEPARIN SODIUM 5000 UNITS: 5000 INJECTION INTRAVENOUS; SUBCUTANEOUS at 17:14

## 2021-01-01 RX ADMIN — HEPARIN SODIUM 5000 UNITS: 5000 INJECTION INTRAVENOUS; SUBCUTANEOUS at 05:57

## 2021-01-01 RX ADMIN — HEPARIN SODIUM 5000 UNITS: 5000 INJECTION INTRAVENOUS; SUBCUTANEOUS at 05:54

## 2021-01-01 RX ADMIN — METOPROLOL TARTRATE 50 MG: 50 TABLET, FILM COATED ORAL at 08:29

## 2021-01-01 RX ADMIN — AMLODIPINE BESYLATE 10 MG: 10 TABLET ORAL at 21:15

## 2021-01-01 RX ADMIN — INSULIN LISPRO 1 UNITS: 100 INJECTION, SOLUTION INTRAVENOUS; SUBCUTANEOUS at 12:34

## 2021-01-01 RX ADMIN — POLYETHYLENE GLYCOL 3350 17 G: 17 POWDER, FOR SOLUTION ORAL at 08:55

## 2021-01-01 RX ADMIN — SENNOSIDES 17.2 MG: 8.6 TABLET ORAL at 09:26

## 2021-01-01 RX ADMIN — AMLODIPINE BESYLATE 10 MG: 10 TABLET ORAL at 09:36

## 2021-01-01 RX ADMIN — LIDOCAINE 5% 1 PATCH: 700 PATCH TOPICAL at 10:33

## 2021-01-01 RX ADMIN — ACETAMINOPHEN 650 MG: 325 TABLET, FILM COATED ORAL at 13:48

## 2021-01-01 RX ADMIN — INSULIN GLARGINE 5 UNITS: 100 INJECTION, SOLUTION SUBCUTANEOUS at 20:34

## 2021-01-01 RX ADMIN — DOCUSATE SODIUM 100 MG: 100 CAPSULE ORAL at 17:19

## 2021-01-01 RX ADMIN — HEPARIN SODIUM 5000 UNITS: 5000 INJECTION INTRAVENOUS; SUBCUTANEOUS at 05:02

## 2021-01-01 RX ADMIN — INSULIN LISPRO 1 UNITS: 100 INJECTION, SOLUTION INTRAVENOUS; SUBCUTANEOUS at 23:11

## 2021-01-01 RX ADMIN — ACETAMINOPHEN 975 MG: 325 TABLET, FILM COATED ORAL at 13:26

## 2021-01-01 RX ADMIN — HEPARIN SODIUM 5000 UNITS: 5000 INJECTION INTRAVENOUS; SUBCUTANEOUS at 05:16

## 2021-01-01 RX ADMIN — HEPARIN SODIUM 5000 UNITS: 5000 INJECTION INTRAVENOUS; SUBCUTANEOUS at 21:07

## 2021-01-01 RX ADMIN — ACETAMINOPHEN 975 MG: 325 TABLET, FILM COATED ORAL at 22:35

## 2021-01-01 RX ADMIN — AMLODIPINE BESYLATE 10 MG: 10 TABLET ORAL at 08:48

## 2021-01-01 RX ADMIN — METOPROLOL TARTRATE 50 MG: 50 TABLET, FILM COATED ORAL at 21:07

## 2021-01-01 RX ADMIN — ACETAMINOPHEN 975 MG: 325 TABLET, FILM COATED ORAL at 21:04

## 2021-01-01 RX ADMIN — HEPARIN SODIUM 5000 UNITS: 5000 INJECTION INTRAVENOUS; SUBCUTANEOUS at 06:55

## 2021-01-01 RX ADMIN — HEPARIN SODIUM 5000 UNITS: 5000 INJECTION INTRAVENOUS; SUBCUTANEOUS at 14:03

## 2021-01-01 RX ADMIN — ACETAMINOPHEN 975 MG: 325 TABLET, FILM COATED ORAL at 21:13

## 2021-01-01 RX ADMIN — AMLODIPINE BESYLATE 10 MG: 10 TABLET ORAL at 09:27

## 2021-01-01 RX ADMIN — INSULIN LISPRO 1 UNITS: 100 INJECTION, SOLUTION INTRAVENOUS; SUBCUTANEOUS at 20:34

## 2021-01-01 RX ADMIN — METOPROLOL TARTRATE 50 MG: 50 TABLET, FILM COATED ORAL at 11:04

## 2021-01-01 RX ADMIN — SODIUM CHLORIDE, SODIUM GLUCONATE, SODIUM ACETATE, POTASSIUM CHLORIDE, MAGNESIUM CHLORIDE, SODIUM PHOSPHATE, DIBASIC, AND POTASSIUM PHOSPHATE 75 ML/HR: .53; .5; .37; .037; .03; .012; .00082 INJECTION, SOLUTION INTRAVENOUS at 11:41

## 2021-01-01 RX ADMIN — ACETAMINOPHEN 975 MG: 325 TABLET, FILM COATED ORAL at 05:57

## 2021-01-01 RX ADMIN — HEPARIN SODIUM 5000 UNITS: 5000 INJECTION INTRAVENOUS; SUBCUTANEOUS at 22:49

## 2021-01-01 RX ADMIN — HEPARIN SODIUM 5000 UNITS: 5000 INJECTION INTRAVENOUS; SUBCUTANEOUS at 13:40

## 2021-01-01 RX ADMIN — SODIUM CHLORIDE, SODIUM GLUCONATE, SODIUM ACETATE, POTASSIUM CHLORIDE, MAGNESIUM CHLORIDE, SODIUM PHOSPHATE, DIBASIC, AND POTASSIUM PHOSPHATE 75 ML/HR: .53; .5; .37; .037; .03; .012; .00082 INJECTION, SOLUTION INTRAVENOUS at 10:57

## 2021-01-01 RX ADMIN — ATORVASTATIN CALCIUM 20 MG: 20 TABLET, FILM COATED ORAL at 15:59

## 2021-01-01 RX ADMIN — INSULIN LISPRO 1 UNITS: 100 INJECTION, SOLUTION INTRAVENOUS; SUBCUTANEOUS at 22:37

## 2021-01-01 RX ADMIN — TAMSULOSIN HYDROCHLORIDE 0.4 MG: 0.4 CAPSULE ORAL at 16:53

## 2021-01-01 RX ADMIN — ONDANSETRON 4 MG: 2 INJECTION INTRAMUSCULAR; INTRAVENOUS at 14:05

## 2021-01-01 RX ADMIN — ATORVASTATIN CALCIUM 20 MG: 20 TABLET, FILM COATED ORAL at 08:36

## 2021-01-01 RX ADMIN — INSULIN GLARGINE 5 UNITS: 100 INJECTION, SOLUTION SUBCUTANEOUS at 21:39

## 2021-01-01 RX ADMIN — TAMSULOSIN HYDROCHLORIDE 0.4 MG: 0.4 CAPSULE ORAL at 19:04

## 2021-01-01 RX ADMIN — METOPROLOL TARTRATE 50 MG: 50 TABLET, FILM COATED ORAL at 22:36

## 2021-01-01 RX ADMIN — AMLODIPINE BESYLATE 10 MG: 10 TABLET ORAL at 11:58

## 2021-01-01 RX ADMIN — LIDOCAINE 5% 1 PATCH: 700 PATCH TOPICAL at 11:05

## 2021-01-01 RX ADMIN — INSULIN LISPRO 1 UNITS: 100 INJECTION, SOLUTION INTRAVENOUS; SUBCUTANEOUS at 11:06

## 2021-01-01 RX ADMIN — METOPROLOL TARTRATE 50 MG: 50 TABLET, FILM COATED ORAL at 09:36

## 2021-01-01 RX ADMIN — AMLODIPINE BESYLATE 10 MG: 10 TABLET ORAL at 08:36

## 2021-01-01 RX ADMIN — METOPROLOL TARTRATE 50 MG: 50 TABLET, FILM COATED ORAL at 08:33

## 2021-01-01 RX ADMIN — METOPROLOL TARTRATE 50 MG: 50 TABLET, FILM COATED ORAL at 21:24

## 2021-01-01 RX ADMIN — LIDOCAINE 5% 1 PATCH: 700 PATCH TOPICAL at 13:16

## 2021-01-01 RX ADMIN — HEPARIN SODIUM 5000 UNITS: 5000 INJECTION INTRAVENOUS; SUBCUTANEOUS at 14:57

## 2021-01-01 RX ADMIN — TAMSULOSIN HYDROCHLORIDE 0.4 MG: 0.4 CAPSULE ORAL at 17:19

## 2021-01-01 RX ADMIN — HEPARIN SODIUM 5000 UNITS: 5000 INJECTION INTRAVENOUS; SUBCUTANEOUS at 22:36

## 2021-01-01 RX ADMIN — INSULIN LISPRO 1 UNITS: 100 INJECTION, SOLUTION INTRAVENOUS; SUBCUTANEOUS at 21:28

## 2021-01-01 RX ADMIN — TAMSULOSIN HYDROCHLORIDE 0.4 MG: 0.4 CAPSULE ORAL at 22:36

## 2021-01-01 RX ADMIN — ACETAMINOPHEN 975 MG: 325 TABLET, FILM COATED ORAL at 14:57

## 2021-01-01 RX ADMIN — ACETAMINOPHEN 975 MG: 325 TABLET, FILM COATED ORAL at 05:58

## 2021-01-01 RX ADMIN — POLYETHYLENE GLYCOL 3350 17 G: 17 POWDER, FOR SOLUTION ORAL at 09:27

## 2021-01-01 RX ADMIN — HEPARIN SODIUM 5000 UNITS: 5000 INJECTION INTRAVENOUS; SUBCUTANEOUS at 21:04

## 2021-01-01 RX ADMIN — DOCUSATE SODIUM 100 MG: 100 CAPSULE ORAL at 09:12

## 2021-01-01 RX ADMIN — AMLODIPINE BESYLATE 10 MG: 10 TABLET ORAL at 08:29

## 2021-01-01 RX ADMIN — LOSARTAN POTASSIUM 50 MG: 50 TABLET, FILM COATED ORAL at 13:15

## 2021-01-01 RX ADMIN — HEPARIN SODIUM 5000 UNITS: 5000 INJECTION INTRAVENOUS; SUBCUTANEOUS at 14:47

## 2021-01-01 RX ADMIN — LOSARTAN POTASSIUM 50 MG: 50 TABLET, FILM COATED ORAL at 08:33

## 2021-01-01 RX ADMIN — AMLODIPINE BESYLATE 10 MG: 10 TABLET ORAL at 08:33

## 2021-01-01 RX ADMIN — TAMSULOSIN HYDROCHLORIDE 0.4 MG: 0.4 CAPSULE ORAL at 18:38

## 2021-01-01 RX ADMIN — INSULIN LISPRO 1 UNITS: 100 INJECTION, SOLUTION INTRAVENOUS; SUBCUTANEOUS at 23:07

## 2021-01-01 RX ADMIN — ATORVASTATIN CALCIUM 20 MG: 20 TABLET, FILM COATED ORAL at 08:55

## 2021-01-01 RX ADMIN — DOCUSATE SODIUM 100 MG: 100 CAPSULE ORAL at 08:36

## 2021-01-01 RX ADMIN — POTASSIUM CHLORIDE 40 MEQ: 20 SOLUTION ORAL at 11:58

## 2021-01-01 RX ADMIN — AMLODIPINE BESYLATE 10 MG: 10 TABLET ORAL at 10:34

## 2021-01-01 RX ADMIN — ONDANSETRON 4 MG: 2 INJECTION INTRAMUSCULAR; INTRAVENOUS at 17:53

## 2021-01-01 RX ADMIN — METOPROLOL TARTRATE 50 MG: 50 TABLET, FILM COATED ORAL at 10:34

## 2021-01-01 RX ADMIN — FUROSEMIDE 20 MG: 20 TABLET ORAL at 08:33

## 2021-01-01 RX ADMIN — ACETAMINOPHEN 975 MG: 325 TABLET, FILM COATED ORAL at 05:16

## 2021-01-01 RX ADMIN — ACETAMINOPHEN 975 MG: 325 TABLET, FILM COATED ORAL at 22:06

## 2021-01-01 RX ADMIN — ACETAMINOPHEN 975 MG: 325 TABLET, FILM COATED ORAL at 22:47

## 2021-01-01 RX ADMIN — ACETAMINOPHEN 975 MG: 325 TABLET, FILM COATED ORAL at 13:40

## 2021-01-01 RX ADMIN — HEPARIN SODIUM 5000 UNITS: 5000 INJECTION INTRAVENOUS; SUBCUTANEOUS at 06:38

## 2021-01-01 RX ADMIN — INSULIN GLARGINE 5 UNITS: 100 INJECTION, SOLUTION SUBCUTANEOUS at 22:49

## 2021-01-01 RX ADMIN — ACETAMINOPHEN 975 MG: 325 TABLET, FILM COATED ORAL at 13:53

## 2021-01-01 RX ADMIN — TAMSULOSIN HYDROCHLORIDE 0.4 MG: 0.4 CAPSULE ORAL at 17:13

## 2021-01-01 RX ADMIN — DOCUSATE SODIUM 100 MG: 100 CAPSULE ORAL at 17:15

## 2021-01-01 RX ADMIN — ONDANSETRON 4 MG: 2 INJECTION INTRAMUSCULAR; INTRAVENOUS at 12:16

## 2021-01-01 RX ADMIN — HEPARIN SODIUM 5000 UNITS: 5000 INJECTION INTRAVENOUS; SUBCUTANEOUS at 13:26

## 2021-01-01 RX ADMIN — ATORVASTATIN CALCIUM 20 MG: 20 TABLET, FILM COATED ORAL at 09:12

## 2021-01-01 RX ADMIN — HEPARIN SODIUM 5000 UNITS: 5000 INJECTION INTRAVENOUS; SUBCUTANEOUS at 05:09

## 2021-01-01 RX ADMIN — SODIUM CHLORIDE 500 ML: 0.9 INJECTION, SOLUTION INTRAVENOUS at 13:20

## 2021-01-01 RX ADMIN — INSULIN LISPRO 1 UNITS: 100 INJECTION, SOLUTION INTRAVENOUS; SUBCUTANEOUS at 08:39

## 2021-01-01 RX ADMIN — INSULIN LISPRO 1 UNITS: 100 INJECTION, SOLUTION INTRAVENOUS; SUBCUTANEOUS at 07:30

## 2021-01-01 RX ADMIN — SENNOSIDES 17.2 MG: 8.6 TABLET ORAL at 09:36

## 2021-01-01 RX ADMIN — INSULIN GLARGINE 5 UNITS: 100 INJECTION, SOLUTION SUBCUTANEOUS at 21:45

## 2021-01-01 RX ADMIN — SODIUM CHLORIDE, SODIUM GLUCONATE, SODIUM ACETATE, POTASSIUM CHLORIDE, MAGNESIUM CHLORIDE, SODIUM PHOSPHATE, DIBASIC, AND POTASSIUM PHOSPHATE 75 ML/HR: .53; .5; .37; .037; .03; .012; .00082 INJECTION, SOLUTION INTRAVENOUS at 10:06

## 2021-01-01 RX ADMIN — ACETAMINOPHEN 975 MG: 325 TABLET, FILM COATED ORAL at 13:14

## 2021-01-01 RX ADMIN — INSULIN LISPRO 2 UNITS: 100 INJECTION, SOLUTION INTRAVENOUS; SUBCUTANEOUS at 21:45

## 2021-01-01 RX ADMIN — SENNOSIDES 17.2 MG: 8.6 TABLET ORAL at 09:12

## 2021-01-01 RX ADMIN — LIDOCAINE HYDROCHLORIDE AND EPINEPHRINE 5 ML: 10; 10 INJECTION, SOLUTION INFILTRATION; PERINEURAL at 13:37

## 2021-01-01 RX ADMIN — ATORVASTATIN CALCIUM 20 MG: 20 TABLET, FILM COATED ORAL at 09:36

## 2021-01-01 RX ADMIN — SODIUM CHLORIDE 500 ML: 0.9 INJECTION, SOLUTION INTRAVENOUS at 13:28

## 2021-01-01 RX ADMIN — ACETAMINOPHEN 975 MG: 325 TABLET, FILM COATED ORAL at 05:09

## 2021-01-01 RX ADMIN — LOSARTAN POTASSIUM 50 MG: 50 TABLET, FILM COATED ORAL at 11:58

## 2021-01-01 RX ADMIN — METOPROLOL TARTRATE 50 MG: 50 TABLET, FILM COATED ORAL at 09:26

## 2021-01-01 RX ADMIN — HEPARIN SODIUM 5000 UNITS: 5000 INJECTION INTRAVENOUS; SUBCUTANEOUS at 13:48

## 2021-01-01 RX ADMIN — METOPROLOL TARTRATE 50 MG: 50 TABLET, FILM COATED ORAL at 11:58

## 2021-01-01 RX ADMIN — POTASSIUM CHLORIDE 20 MEQ: 1500 TABLET, EXTENDED RELEASE ORAL at 10:55

## 2021-01-01 RX ADMIN — LOSARTAN POTASSIUM 50 MG: 50 TABLET, FILM COATED ORAL at 11:04

## 2021-01-01 RX ADMIN — SODIUM CHLORIDE, SODIUM GLUCONATE, SODIUM ACETATE, POTASSIUM CHLORIDE, MAGNESIUM CHLORIDE, SODIUM PHOSPHATE, DIBASIC, AND POTASSIUM PHOSPHATE 75 ML/HR: .53; .5; .37; .037; .03; .012; .00082 INJECTION, SOLUTION INTRAVENOUS at 23:20

## 2021-01-01 RX ADMIN — FUROSEMIDE 20 MG: 20 TABLET ORAL at 13:14

## 2021-01-01 RX ADMIN — TETANUS TOXOID, REDUCED DIPHTHERIA TOXOID AND ACELLULAR PERTUSSIS VACCINE, ADSORBED 0.5 ML: 5; 2.5; 8; 8; 2.5 SUSPENSION INTRAMUSCULAR at 12:08

## 2021-01-01 RX ADMIN — SODIUM CHLORIDE, SODIUM GLUCONATE, SODIUM ACETATE, POTASSIUM CHLORIDE, MAGNESIUM CHLORIDE, SODIUM PHOSPHATE, DIBASIC, AND POTASSIUM PHOSPHATE 75 ML/HR: .53; .5; .37; .037; .03; .012; .00082 INJECTION, SOLUTION INTRAVENOUS at 20:17

## 2021-01-01 RX ADMIN — METOPROLOL TARTRATE 50 MG: 50 TABLET, FILM COATED ORAL at 01:30

## 2021-01-01 RX ADMIN — METOPROLOL TARTRATE 50 MG: 50 TABLET, FILM COATED ORAL at 13:14

## 2021-01-01 RX ADMIN — METOPROLOL TARTRATE 50 MG: 50 TABLET, FILM COATED ORAL at 22:06

## 2021-01-01 RX ADMIN — AMLODIPINE BESYLATE 10 MG: 10 TABLET ORAL at 09:12

## 2021-01-01 RX ADMIN — AMLODIPINE BESYLATE 10 MG: 10 TABLET ORAL at 08:55

## 2021-01-01 RX ADMIN — TAMSULOSIN HYDROCHLORIDE 0.4 MG: 0.4 CAPSULE ORAL at 17:48

## 2021-01-01 RX ADMIN — INSULIN LISPRO 1 UNITS: 100 INJECTION, SOLUTION INTRAVENOUS; SUBCUTANEOUS at 21:23

## 2021-01-01 RX ADMIN — POTASSIUM CHLORIDE 20 MEQ: 1500 TABLET, EXTENDED RELEASE ORAL at 10:56

## 2021-01-01 RX ADMIN — DOCUSATE SODIUM 100 MG: 100 CAPSULE ORAL at 08:55

## 2021-01-01 RX ADMIN — HEPARIN SODIUM 5000 UNITS: 5000 INJECTION INTRAVENOUS; SUBCUTANEOUS at 21:24

## 2021-01-01 RX ADMIN — INSULIN LISPRO 2 UNITS: 100 INJECTION, SOLUTION INTRAVENOUS; SUBCUTANEOUS at 12:40

## 2021-01-01 RX ADMIN — FUROSEMIDE 20 MG: 20 TABLET ORAL at 11:04

## 2021-01-01 RX ADMIN — HYDRALAZINE HYDROCHLORIDE 5 MG: 20 INJECTION INTRAMUSCULAR; INTRAVENOUS at 23:33

## 2021-01-01 RX ADMIN — DOCUSATE SODIUM 100 MG: 100 CAPSULE ORAL at 22:36

## 2021-01-01 RX ADMIN — ACETAMINOPHEN 975 MG: 325 TABLET, FILM COATED ORAL at 04:21

## 2021-01-01 RX ADMIN — INSULIN LISPRO 1 UNITS: 100 INJECTION, SOLUTION INTRAVENOUS; SUBCUTANEOUS at 21:27

## 2021-01-01 RX ADMIN — DOCUSATE SODIUM 100 MG: 100 CAPSULE ORAL at 09:36

## 2021-01-01 RX ADMIN — INSULIN LISPRO 2 UNITS: 100 INJECTION, SOLUTION INTRAVENOUS; SUBCUTANEOUS at 08:49

## 2021-01-01 RX ADMIN — DOCUSATE SODIUM 100 MG: 100 CAPSULE ORAL at 17:00

## 2021-01-01 RX ADMIN — TAMSULOSIN HYDROCHLORIDE 0.4 MG: 0.4 CAPSULE ORAL at 17:15

## 2021-01-01 RX ADMIN — INSULIN GLARGINE 5 UNITS: 100 INJECTION, SOLUTION SUBCUTANEOUS at 23:00

## 2021-01-01 RX ADMIN — ATORVASTATIN CALCIUM 20 MG: 20 TABLET, FILM COATED ORAL at 09:27

## 2021-01-01 RX ADMIN — HEPARIN SODIUM 5000 UNITS: 5000 INJECTION INTRAVENOUS; SUBCUTANEOUS at 22:06

## 2021-01-01 RX ADMIN — TAMSULOSIN HYDROCHLORIDE 0.4 MG: 0.4 CAPSULE ORAL at 17:14

## 2021-01-01 RX ADMIN — LOSARTAN POTASSIUM 50 MG: 50 TABLET, FILM COATED ORAL at 09:26

## 2021-01-01 RX ADMIN — SENNOSIDES 17.2 MG: 8.6 TABLET ORAL at 08:55

## 2021-01-01 RX ADMIN — TBO-FILGRASTIM 480 MCG: 480 INJECTION, SOLUTION SUBCUTANEOUS at 19:12

## 2021-01-01 RX ADMIN — HEPARIN SODIUM 5000 UNITS: 5000 INJECTION INTRAVENOUS; SUBCUTANEOUS at 05:31

## 2021-01-01 RX ADMIN — INSULIN LISPRO 1 UNITS: 100 INJECTION, SOLUTION INTRAVENOUS; SUBCUTANEOUS at 17:22

## 2021-01-01 RX ADMIN — HEPARIN SODIUM 5000 UNITS: 5000 INJECTION INTRAVENOUS; SUBCUTANEOUS at 05:06

## 2021-01-01 RX ADMIN — HEPARIN SODIUM 5000 UNITS: 5000 INJECTION INTRAVENOUS; SUBCUTANEOUS at 05:43

## 2021-01-01 RX ADMIN — METOPROLOL TARTRATE 50 MG: 50 TABLET, FILM COATED ORAL at 08:48

## 2021-01-01 RX ADMIN — HEPARIN SODIUM 5000 UNITS: 5000 INJECTION INTRAVENOUS; SUBCUTANEOUS at 21:39

## 2021-01-01 RX ADMIN — ACETAMINOPHEN 975 MG: 325 TABLET, FILM COATED ORAL at 21:06

## 2021-01-01 RX ADMIN — LOSARTAN POTASSIUM 50 MG: 50 TABLET, FILM COATED ORAL at 09:12

## 2021-01-01 RX ADMIN — METOPROLOL TARTRATE 50 MG: 50 TABLET, FILM COATED ORAL at 08:55

## 2021-02-12 ENCOUNTER — IMMUNIZATIONS (OUTPATIENT)
Dept: FAMILY MEDICINE CLINIC | Facility: HOSPITAL | Age: 86
End: 2021-02-12

## 2021-02-12 DIAGNOSIS — Z23 ENCOUNTER FOR IMMUNIZATION: Primary | ICD-10-CM

## 2021-02-12 PROCEDURE — 91301 SARS-COV-2 / COVID-19 MRNA VACCINE (MODERNA) 100 MCG: CPT

## 2021-02-12 PROCEDURE — 0011A SARS-COV-2 / COVID-19 MRNA VACCINE (MODERNA) 100 MCG: CPT

## 2021-03-11 ENCOUNTER — IMMUNIZATIONS (OUTPATIENT)
Dept: FAMILY MEDICINE CLINIC | Facility: HOSPITAL | Age: 86
End: 2021-03-11

## 2021-03-11 DIAGNOSIS — Z23 ENCOUNTER FOR IMMUNIZATION: Primary | ICD-10-CM

## 2021-03-11 PROCEDURE — 0012A SARS-COV-2 / COVID-19 MRNA VACCINE (MODERNA) 100 MCG: CPT

## 2021-03-11 PROCEDURE — 91301 SARS-COV-2 / COVID-19 MRNA VACCINE (MODERNA) 100 MCG: CPT

## 2021-08-07 PROBLEM — I10 HYPERTENSION: Status: ACTIVE | Noted: 2021-01-01

## 2021-08-07 PROBLEM — S12.300A: Status: ACTIVE | Noted: 2021-01-01

## 2021-08-07 PROBLEM — Z85.46 PERSONAL HISTORY OF PROSTATE CANCER: Status: ACTIVE | Noted: 2021-01-01

## 2021-08-07 PROBLEM — S12.690A COMPRESSION FRACTURE OF C7 VERTEBRA (HCC): Status: ACTIVE | Noted: 2021-01-01
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
## 2021-08-07 PROBLEM — N39.41 URGE INCONTINENCE OF URINE: Status: ACTIVE | Noted: 2021-01-01

## 2021-08-07 NOTE — ED PROVIDER NOTES
History  Chief Complaint   Patient presents with    Neck Pain     pt tripped and hit head on the stove, pt on aspirin  pt c/o neck l shoulder pain    Shoulder Pain     80year-old male, on aspirin, presenting today after he had a trip and fall yesterday while in his kitchen, was not using his cane or walker at that point in time  Here with wife for relays that he does usually need assistance with ambulation with these devices  Patient fell head 1st into the oven striking the top of his head, did not lose consciousness however then fell the ground  Was able to get up afterwards with assistance from wife  Slept in a recliner  He was complaining of chest pain last night as well as neck pain which continued this morning  No longer has chest pain  Does not know any other joint pain other than his neck and upper back pain  He was able to ambulate morning  Denies headache, changes in vision, weakness, numbness paresthesias, nausea, vomiting, chest or abdominal pain, hip or groin pain  Prior to Admission Medications   Prescriptions Last Dose Informant Patient Reported? Taking? amLODIPine (NORVASC) 10 mg tablet 8/6/2021 at Unknown time  Yes Yes   Sig: Take 10 mg by mouth daily   atorvastatin (LIPITOR) 20 mg tablet 8/6/2021 at Unknown time  Yes Yes   Sig: Take 20 mg by mouth daily   irbesartan (AVAPRO) 150 mg tablet 8/6/2021 at Unknown time  Yes Yes   Sig: Take 150 mg by mouth daily   metoprolol tartrate (LOPRESSOR) 50 mg tablet 8/6/2021 at Unknown time  Yes Yes   Sig: Take 50 mg by mouth   sitaGLIPtin (JANUVIA) 25 mg tablet   Yes Yes   Sig: Take 25 mg by mouth daily   tamsulosin (FLOMAX) 0 4 mg 8/6/2021 at Unknown time  Yes Yes   Sig: TAKE 1 CAPSULE BY MOUTH EVERY DAY 1/2 HR  FOLLOWING THE SAME MEAL EACH DAY      Facility-Administered Medications: None       History reviewed  No pertinent past medical history  History reviewed  No pertinent surgical history  History reviewed   No pertinent family history  I have reviewed and agree with the history as documented  E-Cigarette/Vaping     E-Cigarette/Vaping Substances     Social History     Tobacco Use    Smoking status: Not on file   Substance Use Topics    Alcohol use: Not on file    Drug use: Not on file       Review of Systems   Constitutional: Negative  Negative for chills, fever and unexpected weight change  Denies IV drug use     HENT: Negative  Eyes: Negative  Respiratory: Negative  Negative for cough, chest tightness, shortness of breath and wheezing  Cardiovascular: Negative  Negative for chest pain and palpitations  Gastrointestinal: Negative  Negative for abdominal pain, constipation, diarrhea, nausea and vomiting  Genitourinary: Negative  Negative for difficulty urinating, dysuria, flank pain, frequency, hematuria and urgency  Denies numbness, tingling in the groin  Musculoskeletal: Positive for back pain and neck pain  Negative for arthralgias, gait problem, joint swelling, myalgias and neck stiffness  Skin: Negative  Negative for color change  Neurological: Negative  Negative for dizziness, tremors, weakness, light-headedness, numbness and headaches  All other systems reviewed and are negative  Physical Exam  Physical Exam  Vitals and nursing note reviewed  Constitutional:       General: He is not in acute distress  Appearance: He is well-developed  He is not diaphoretic  HENT:      Head: Normocephalic and atraumatic  Right Ear: External ear normal       Left Ear: External ear normal       Nose: Nose normal       Mouth/Throat:      Pharynx: No oropharyngeal exudate  Eyes:      General: No scleral icterus  Right eye: No discharge  Left eye: No discharge  Conjunctiva/sclera: Conjunctivae normal       Pupils: Pupils are equal, round, and reactive to light  Cardiovascular:      Rate and Rhythm: Normal rate and regular rhythm  Pulses: Normal pulses  Heart sounds: Normal heart sounds  No murmur heard  No friction rub  No gallop  Pulmonary:      Effort: Pulmonary effort is normal  No respiratory distress  Breath sounds: Normal breath sounds  No stridor  No wheezing, rhonchi or rales  Comments: spo2 is 97% indicating adequate oxygenation   Chest:      Chest wall: No tenderness  Abdominal:      General: Abdomen is flat  Bowel sounds are normal  There is no distension  Palpations: Abdomen is soft  There is no mass  Tenderness: There is no abdominal tenderness  There is no guarding or rebound  Hernia: No hernia is present  Musculoskeletal:      Cervical back: Normal range of motion and neck supple  Legs:    Lymphadenopathy:      Cervical: No cervical adenopathy  Skin:     General: Skin is warm and dry  Capillary Refill: Capillary refill takes less than 2 seconds  Coloration: Skin is not pale  Findings: No erythema or rash  Neurological:      General: No focal deficit present  Mental Status: He is alert and oriented to person, place, and time  Mental status is at baseline           Vital Signs  ED Triage Vitals   Temperature Pulse Respirations Blood Pressure SpO2   08/07/21 0941 08/07/21 0941 08/07/21 0941 08/07/21 0941 08/07/21 0941   98 °F (36 7 °C) 73 20 (!) 193/90 97 %      Temp Source Heart Rate Source Patient Position - Orthostatic VS BP Location FiO2 (%)   08/07/21 0941 08/07/21 0939 08/07/21 0941 08/07/21 0941 --   Temporal Monitor Lying Left arm       Pain Score       08/07/21 1103       7           Vitals:    08/07/21 1330 08/07/21 1337 08/07/21 1445 08/07/21 1540   BP:  (!) 172/82 (!) 180/80 170/76   Pulse: 76 77 74 70   Patient Position - Orthostatic VS:  Sitting Sitting Sitting         Visual Acuity  Visual Acuity      Most Recent Value   L Pupil Size (mm)  1   R Pupil Size (mm)  1          ED Medications  Medications   HYDROmorphone (DILAUDID) injection 0 5 mg (0 5 mg Intravenous Given 8/7/21 1215)   ondansetron (ZOFRAN) injection 4 mg (4 mg Intravenous Given 8/7/21 1310)   sodium chloride 0 9 % bolus 500 mL (500 mL Intravenous New Bag 8/7/21 1328)   ondansetron (ZOFRAN) injection 4 mg (4 mg Intravenous Given 8/7/21 1405)       Diagnostic Studies  Results Reviewed     Procedure Component Value Units Date/Time    CKMB [[id number]]  (Abnormal) Collected: 08/07/21 1058    Lab Status: Final result Specimen: Blood from Arm, Left Updated: 08/07/21 1240     CK-MB Index 2 3 %      CK-MB 6 5 ng/mL     Protime-INR [[id number]]  (Normal) Collected: 08/07/21 1058    Lab Status: Final result Specimen: Blood from Arm, Left Updated: 08/07/21 1155     Protime 12 7 seconds      INR 0 97    APTT [[id number]]  (Normal) Collected: 08/07/21 1058    Lab Status: Final result Specimen: Blood from Arm, Left Updated: 08/07/21 1155     PTT 29 seconds     Basic metabolic panel [[id number]]  (Abnormal) Collected: 08/07/21 1058    Lab Status: Final result Specimen: Blood from Arm, Left Updated: 08/07/21 1142     Sodium 141 mmol/L      Potassium 4 3 mmol/L      Chloride 107 mmol/L      CO2 26 mmol/L      ANION GAP 8 mmol/L      BUN 19 mg/dL      Creatinine 1 16 mg/dL      Glucose 148 mg/dL      Calcium 9 4 mg/dL      eGFR 54 ml/min/1 73sq m     Narrative:      [place] guidelines for Chronic Kidney Disease (CKD):     Stage 1 with normal or high GFR (GFR > 90 mL/min/1 73 square meters)    Stage 2 Mild CKD (GFR = 60-89 mL/min/1 73 square meters)    Stage 3A Moderate CKD (GFR = 45-59 mL/min/1 73 square meters)    Stage 3B Moderate CKD (GFR = 30-44 mL/min/1 73 square meters)    Stage 4 Severe CKD (GFR = 15-29 mL/min/1 73 square meters)    Stage 5 End Stage CKD (GFR <15 mL/min/1 73 square meters)  Note: GFR calculation is accurate only with a steady state creatinine    CK Total with Reflex CKMB [[id number]]  (Normal) Collected: 08/07/21 1058    Lab Status: Final result Specimen: Blood from Arm, Left Updated: 08/07/21 1142     Total  U/L     Troponin I [[id number]]  (Normal) Collected: 08/07/21 1058    Lab Status: Final result Specimen: Blood from Arm, Left Updated: 08/07/21 1126     Troponin I <0 02 ng/mL     CBC and differential [[id number]]  (Abnormal) Collected: 08/07/21 1058    Lab Status: Final result Specimen: Blood from Arm, Left Updated: 08/07/21 1104     WBC 8 50 Thousand/uL      RBC 4 05 Million/uL      Hemoglobin 12 7 g/dL      Hematocrit 37 2 %      MCV 92 fL      MCH 31 4 pg      MCHC 34 1 g/dL      RDW 12 2 %      MPV 9 8 fL      Platelets 888 Thousands/uL      nRBC 0 /100 WBCs      Neutrophils Relative 74 %      Immat GRANS % 1 %      Lymphocytes Relative 12 %      Monocytes Relative 9 %      Eosinophils Relative 3 %      Basophils Relative 1 %      Neutrophils Absolute 6 37 Thousands/µL      Immature Grans Absolute 0 08 Thousand/uL      Lymphocytes Absolute 0 99 Thousands/µL      Monocytes Absolute 0 73 Thousand/µL      Eosinophils Absolute 0 28 Thousand/µL      Basophils Absolute 0 05 Thousands/µL                  CT head without contrast   Final Result by Tyson Zee DO (08/07 1126)   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  No acute intracranial abnormality  Workstation performed: [id number]         CT cervical spine without contrast   Final Result by Tyson Zee DO (08/07 1136)   1  Minimally displaced fractures involving the spinous processes of the C4 through C7 vertebral bodies  2   Mild irregularity of the anterior, inferior endplate of C7, suspicious for mild compression fracture  No bony retropulsion  3   Severe degenerative changes of the cervical spine, most pronounced C5-C6 as well as the atlantoaxial articulation  Given the history of trauma, consider follow-up MRI of the cervical spine to exclude cord contusion, secondary to hyperflexion injury           Recommend follow-up neurosurgical or orthopedic spine surgery consultation  Consider follow-up MRI of the cervical spine to exclude cord contusion  I personally discussed this study with Lillie Mann on 8/7/2021 at 11:28 AM       Workstation performed: [id number]         CT chest without contrast   Final Result by Lam Zhu DO (08/07 1158)   1  No traumatic thoracic injury  2   Numerous cystic lesions in the pancreas  Correlate for cystic pancreatic neoplasm  For simple cyst(s) 2 cm or greater recommend gastroenterology and/or surgical oncology consult  EUS is likely now warranted  Preferred imaging modality: abdomen MRI    and MRCP with and without IV contrast, or triple phase abdomen CT with IV contrast, or abdomen MRI and MRCP without IV contrast       The recommendations regarding pancreatic findings assumes that patient does not have family history of pancreatic cancer nor have any symptoms potentially attributable to pancreatic cystic lesions (hyperamylasemia, recent-onset diabetes, severe    epigastric pain, weight loss, steatorrhea, or jaundice ) If these conditions are not true, then management should be deferred to judgement of specialists such as gastroenterologists or oncologic surgeons  Recommendations are based on recent consensus    statements on management of pancreatic cystic lesions from [address] [place] [address] Gastro[employer] Association, [employer] of Radiology, the journal Pancreatology, and our own institutional consensus  3   Multiple noncalcified pulmonary nodules bilaterally, largest measuring 10 mm right lower lobe  Differential would include primary versus metastatic neoplasm  Based on current Fleischner Society 2017 Guidelines on incidental pulmonary nodule, either    PET/CT scan evaluation, tissue sampling or short term interval followup non-contrast CT followup (initially in 3 months) may be considered appropriate                    The study was marked in [facility] for immediate notification  Workstation performed: [id number]         CT recon only thoracic spine   Final Result by Sonia Moore DO (08/07 1157)   Degenerative changes of the thoracic spine  No acute fracture or traumatic subluxation  Workstation performed: [id number]                    Procedures  ECG 12 Lead Documentation Only    Date/Time: 8/7/2021 11:23 AM  Performed by: Bryn Ruffin PA-C  Authorized by: Bryn Ruffin PA-C     Indications / Diagnosis:  Weakness, fall, chest pain   ECG reviewed by me, the ED Provider: yes    Patient location:  ED  Interpretation:     Interpretation: normal    Rate:     ECG rate:  77    ECG rate assessment: normal    Rhythm:     Rhythm: sinus rhythm    Ectopy:     Ectopy: none    QRS:     QRS axis:  Normal    QRS intervals:  Normal  Conduction:     Conduction: abnormal      Abnormal conduction: 1st degree    ST segments:     ST segments:  Normal             ED Course  ED Course as of Aug 07 1551   Sat Aug 07, 2021   1149 Transfer order placed, patient and wife updated      [id number] Patient developed nausea and vomiting after Dilaudid administration, patient then given Zofran by the nurse  1400 Patient vomiting, meds ordered nurse aware                                 SBIRT 22yo+      Most Recent Value   SBIRT (24 yo +)   In order to provide better care to our patients, we are screening all of our patients for alcohol and drug use  Would it be okay to ask you these screening questions? No Filed at: 08/07/2021 1338                    MDM  Number of Diagnoses or Management Options  Cervical compression fracture ([facility] [address] )  Fall, initial encounter  Fracture of cervical spinous process [facility])  Pancreatic cyst  Pulmonary nodules  Diagnosis management comments: Discussed case with trauma Dr Shaylee Law who is agreeable with trauma transfer at this point in time  Incidental findings on chest CT noted  Patient remained in cervical collar during stay   Patient developed nausea and vomiting after Dilaudid administration  Amount and/or Complexity of Data Reviewed  Clinical lab tests: ordered and reviewed  Tests in the radiology section of CPT®: reviewed and ordered  Review and summarize past medical records: yes  Discuss the patient with other providers: yes  Independent visualization of images, tracings, or specimens: yes        Disposition  Final diagnoses:   Cervical compression fracture ([address] )   Fracture of cervical spinous process ([address] )   Pulmonary nodules   Pancreatic cyst   Fall, initial encounter     Time reflects when diagnosis was documented in both MDM as applicable and the Disposition within this note     Time User Action Codes Description Comment    8/7/2021 12:00 PM Lizette Gunner Add [S12  9XXA] Cervical compression fracture ([facility] [address] )     8/7/2021 12:00 PM Lizette Gunner Add [S12  9XXA] Fracture of cervical spinous process ([facility] [address] )     8/7/2021 12:00 PM Lizette Gunner Add [R91 8] Pulmonary nodules     8/7/2021 12:00 PM Lizette Gunner Add [K86 2] Pancreatic cyst     8/7/2021 12:00 PM Lizette Gunner Add [T99  Mertie Maxon, initial encounter       ED Disposition     ED Disposition Condition Date/Time Comment    Transfer to Another Facility-In Network  [facility] Aug 7, 2021 12:00 PM [place]inthia Myers [place] [facility]          MD Documentation      Most Recent Value   Patient Condition  The patient has been stabilized such that within reasonable medical probability, no material deterioration of the patient condition or the condition of the unborn child(roberto) is likely to result from the transfer   Reason for Transfer  Level of Care needed not available at this facility   Benefits of Transfer  Specialized equipment and/or services available at the receiving facility (Include comment)________________________ Morales Faye and neurosurgery evaluation]   Risks of Transfer  Potential for delay in receiving treatment, Potential deterioration of medical condition, Loss of IV, Increased discomfort during transfer, Possible worsening of condition or death during transfer, Other: (Include comment)__________________________   Accepting Physician  Dr Deborah Riddle Name, [facility]   Sending MD Claire gaspar [place],  Dr Donna Katz    Provider Certification  General risk, such as traffic hazards, adverse weather conditions, rough terrain or turbulence, possible failure of equipment (including vehicle or aircraft), or consequences of actions of persons outside the control of the transport personnel, Unanticipated needs of medical equipment and personnel during transport, Risk of worsening condition, The possibility of a transport vehicle being unavailable      RN Documentation      Most Recent Value   Accepting Facility Name, [facility]   Bed Assignment  ED   Report Given to  Veronika Johnson RN   Medications Reviewed with Next Provider of Service  Yes   Transport Mode  Ambulance   Copies of Medical Records Sent  History and Physical, Progress note, Transfer form, Nursing note, Radiology   Transfer Date  08/07/21   Transfer Time  1500      Follow-up Information    None         Discharge Medication List as of 8/7/2021  3:48 PM      CONTINUE these medications which have NOT CHANGED    Details   amLODIPine (NORVASC) 10 mg tablet Take 10 mg by mouth daily, Starting Mon 4/26/2021, Historical Med      atorvastatin (LIPITOR) 20 mg tablet Take 20 mg by mouth daily, Starting Wed 3/31/2021, Historical Med      irbesartan (AVAPRO) 150 mg tablet Take 150 mg by mouth daily, Starting Sat 3/6/2021, Historical Med      metoprolol tartrate (LOPRESSOR) 50 mg tablet Take 50 mg by mouth, Starting Sat 3/6/2021, Historical Med      sitaGLIPtin (JANUVIA) 25 mg tablet Take 25 mg by mouth daily, Historical Med      tamsulosin (FLOMAX) 0 4 mg TAKE 1 CAPSULE BY MOUTH EVERY DAY 1/2 HR   FOLLOWING THE SAME MEAL EACH DAY, Historical Med           No discharge procedures on file      PDMP Review     None          ED Provider  Electronically Signed by           Connie Rowland PA-C  08/07/21 7774

## 2021-08-07 NOTE — H&P
H&P Exam - Trauma   Dewayne Brooks 80 y o  male MRN: [id number]  Unit/Bed#: ED 08 Encounter: [id number]    Assessment/Plan   Trauma Alert: Evaluation  Model of Arrival: Ambulance and Transfer \[facility] [place] [facility]\""  Trauma Team: Attending Radha Hyman, Residents Jane Clark and Fellow 0 [facility]  Consultants: Neurosurgery: [place]  Time Called 550pm    Trauma Active Problems:   S/p fall with head strike on ASA, no LOC  C4-C7 spinous fracture  Compression fracture of C7 anterior endplate    Trauma Plan:   Admit to trauma  Consult neuro surgery  Cervical spine precautions  C-collar    Chief Complaint:   S/p fall with head strike on ASA, no LOC  C4-C7 spinous fracture  Compression fracture of C7 anterior endplate    History of Present Illness   HPI:  Dewayne Brooks is a 80 y o  male with past medical history of hypertension, hyperlipidemia, prostate cancer status post radiation, and skin cancer on ASA who presents who presents as a transfer from [facility] after a fall with head strike  No LOC  Patient was walking without a walker in the kitchen yesterday night and tripped over the rug and fell striking his head on the oven  Patient's wife was able to help patient to his feet  He presented the following day to [facility] ED due to neck pain and thoracic spine pain  Patient was found to have C4-C7 spinous process fractures on CT  Patient's CT the chest was negative for traumatic injury  GCS 14 ([id number])    Mechanism:Fall    Review of Systems   HENT:        No vision changes     Respiratory: Negative for shortness of breath  Cardiovascular: Negative for chest pain  Gastrointestinal: Negative for abdominal pain  Genitourinary: Positive for urgency  Musculoskeletal: Positive for back pain and neck pain  Neurological: Negative for dizziness, weakness, numbness and headaches  12-point, complete review of systems was reviewed and negative except as stated above         Historical Information   History is unobtainable from the patient due to not applicable  Efforts to obtain history included the following sources: NA    Past Medical History:   Diagnosis Date    HLD (hyperlipidemia)     HTN (hypertension)     Prostate CA ([facility] [address] )     Skin cancer      No relevant past surgical history  Social History   Social History     Substance and Sexual Activity   Alcohol Use None     Social History     Substance and Sexual Activity   Drug Use Not on file     Social History     Tobacco Use   Smoking Status Not on file     E-Cigarette/Vaping     E-Cigarette/Vaping Substances     Immunization History   Administered Date(s) Administered    SARS-CoV-2 / COVID-19 mRNA IM ([employer]) 02/12/2021, 03/11/2021     Last Tetanus: unknown  Family History: Non-contributory  Unable to obtain/limited by NA      Meds/Allergies   PTA meds:   Prior to Admission Medications   Prescriptions Last Dose Informant Patient Reported? Taking? amLODIPine (NORVASC) 10 mg tablet   Yes No   Sig: Take 10 mg by mouth daily   atorvastatin (LIPITOR) 20 mg tablet   Yes No   Sig: Take 20 mg by mouth daily   irbesartan (AVAPRO) 150 mg tablet   Yes No   Sig: Take 150 mg by mouth daily   metoprolol tartrate (LOPRESSOR) 50 mg tablet   Yes No   Sig: Take 50 mg by mouth   sitaGLIPtin (JANUVIA) 25 mg tablet   Yes No   Sig: Take 25 mg by mouth daily   tamsulosin (FLOMAX) 0 4 mg   Yes No   Sig: TAKE 1 CAPSULE BY MOUTH EVERY DAY 1/2 HR   FOLLOWING THE SAME MEAL EACH DAY      Facility-Administered Medications: None       No Known Allergies      PHYSICAL EXAM    PE limited by: NA    Objective   Vitals:   First set: Temperature: 98 1 °F (36 7 °C) (08/07/21 1630)  Pulse: 78 (08/07/21 1630)  Respirations: 16 (08/07/21 1630)  Blood Pressure: (!) 204/68 (08/07/21 1630)    Primary Survey:   (A) Airway:  Intact  (B) Breathing:  CTAB  (C) Circulation: Pulses:   pedal  1/4, radial  2/4 and femoral  2/4  (D) Disabliity:  GCS Total:  14, Eye Opening:   Spontaneous = 4, Motor Response: Obeys commands = 6 and Verbal Response: Confused = 4  (E) Expose:  Completed    Secondary Survey: (Click on Physical Exam tab above)  Physical Exam  Vitals reviewed  Constitutional:       General: He is not in acute distress  Appearance: He is not ill-appearing or toxic-appearing  HENT:      Head: Normocephalic  Right Ear: External ear normal       Left Ear: External ear normal       Nose: Nose normal       Mouth/Throat:     Eyes:      General: No scleral icterus  Extraocular Movements: Extraocular movements intact  Conjunctiva/sclera: Conjunctivae normal       Pupils: Pupils are equal, round, and reactive to light  Neck:      Comments: C-spine collar in place  Cardiovascular:      Rate and Rhythm: Normal rate and regular rhythm  Pulses:           Radial pulses are 2+ on the right side and 2+ on the left side  Femoral pulses are 1+ on the right side and 1+ on the left side  Dorsalis pedis pulses are 1+ on the right side and 1+ on the left side  Pulmonary:      Effort: Pulmonary effort is normal  No respiratory distress  Breath sounds: No stridor  No wheezing, rhonchi or rales  Chest:      Chest wall: No tenderness  Abdominal:      General: There is no distension  Palpations: Abdomen is soft  Tenderness: There is no abdominal tenderness  There is no guarding or rebound  Musculoskeletal:         General: No tenderness, deformity or signs of injury  Right lower leg: No edema  Left lower leg: No edema  Skin:     General: Skin is warm and dry  Comments: Patient had multiple skin tags on his back  Multiple flat red lesions on chest and abdomen   Neurological:      General: No focal deficit present  Mental Status: He is alert  He is disoriented  GCS: GCS eye subscore is 4  GCS verbal subscore is 4  GCS motor subscore is 6  Cranial Nerves: Cranial nerves are intact  Sensory: Sensation is intact  Motor: Motor function is intact     Psychiatric:         Mood and Affect: Mood normal          Speech: Speech is delayed  Behavior: Behavior is cooperative  Thought Content: Thought content normal          Cognition and Memory: He exhibits impaired recent memory  Invasive Devices     Peripheral Intravenous Line            Peripheral IV 08/07/21 Left Antecubital <1 day    Peripheral IV 08/07/21 Left Wrist <1 day                Lab Results:   BMP/CMP:   Lab Results   Component Value Date    SODIUM 141 08/07/2021    K 4 3 08/07/2021     08/07/2021    CO2 26 08/07/2021    BUN 19 08/07/2021    CREATININE 1 16 08/07/2021    CALCIUM 9 4 08/07/2021    EGFR 54 08/07/2021   , CBC:   Lab Results   Component Value Date    WBC 8 50 08/07/2021    HGB 12 7 08/07/2021    HCT 37 2 08/07/2021    MCV 92 08/07/2021     08/07/2021    MCH 31 4 08/07/2021    MCHC 34 1 08/07/2021    RDW 12 2 08/07/2021    MPV 9 8 08/07/2021    NRBC 0 08/07/2021    and Coagulation:   Lab Results   Component Value Date    INR 0 97 08/07/2021     Imaging/EKG Studies: Results: I have personally reviewed pertinent reports  8/7 CTH:  Cerebral atrophy with chronic small-vessel ischemic white matter disease  8/7 CT C-spine:  Minimally displaced fractures involving the spinous processes of C4 through C7  Compression fracture the inferior air in plane of C7  Severe degenerative changes of C-spine most pronounced C5-C6, recommend follow-up MRI to exclude cord contusion  CT chest:  No traumatic thoracic injury   incidental finding:  Multiple noncalcified pulmonary nodules bilaterally, largest measuring 10 mm right lower lobe  Numerous cystic lesions in the pancreas           Other Studies: NA    Code Status: No Order  Advance Directive and Living Will:      Power of :    POLST:

## 2021-08-07 NOTE — EMTALA/ACUTE CARE TRANSFER
[address] [facility] [address]  [place] [address]  Dept: [phone]      EMTALA TRANSFER CONSENT    NAME Makayla Mayo                                         1929                              MRN [id number]    I have been informed of my rights regarding examination, treatment, and transfer   by Dr Hortensia Solo DO    Benefits: Specialized equipment and/or services available at the receiving facility (Include comment)________________________ (trauma and neurosurgery evaluation)    Risks: Potential for delay in receiving treatment, Potential deterioration of medical condition, Loss of IV, Increased discomfort during transfer, Possible worsening of condition or death during transfer, Other: (Include comment)__________________________      Transfer Request   I acknowledge that my medical condition has been evaluated and explained to me by the emergency department physician or other qualified medical person and/or my attending physician who has recommended and offered to me further medical examination and treatment  I understand the Hospital's obligation with respect to the treatment and stabilization of my emergency medical condition  I nevertheless request to be transferred  I release the Hospital, the doctor, and any other persons caring for me from all responsibility or liability for any injury or ill effects that may result from my transfer and agree to accept all responsibility for the consequences of my choice to transfer, rather than receive stabilizing treatment at the Hospital  I understand that because the transfer is my request, my insurance may not provide reimbursement for the services  The Hospital will assist and direct me and my family in how to make arrangements for transfer, but the hospital is not liable for any fees charged by the transport service    In spite of this understanding, I refuse to consent to further medical examination and treatment which has been offered to me, and request transfer to  [address] [facility] [place] [facility]   I authorize the performance of emergency medical procedures and treatments upon me in both transit and upon arrival at the receiving facility  Additionally, I authorize the release of any and all medical records to the receiving facility and request they be transported with me, if possible  I authorize the performance of emergency medical procedures and treatments upon me in both transit and upon arrival at the receiving facility  Additionally, I authorize the release of any and all medical records to the receiving facility and request they be transported with me, if possible  I understand that the safest mode of transportation during a medical emergency is an ambulance and that the Hospital advocates the use of this mode of transport  Risks of traveling to the receiving facility by car, including absence of medical control, life sustaining equipment, such as oxygen, and medical personnel has been explained to me and I fully understand them  (ROBBIE CORRECT BOX BELOW)  [  ]  I consent to the stated transfer and to be transported by ambulance/helicopter  [  ]  I consent to the stated transfer, but refuse transportation by ambulance and accept full responsibility for my transportation by car  I understand the risks of non-ambulance transfers and I exonerate the Hospital and its staff from any deterioration in my condition that results from this refusal     X___________________________________________    DATE  21  TIME________  Signature of patient or legally responsible individual signing on patient behalf           RELATIONSHIP TO PATIENT_________________________          Provider Certification    NAME Sanaz Sands                                         1929                              MRN [id number]    A medical screening exam was performed on the above named patient    Based on the examination:    Condition Necessitating Transfer The primary encounter diagnosis was Cervical compression fracture ([address] )  Diagnoses of Fracture of cervical spinous process ([facility] [address] ), Pulmonary nodules, Pancreatic cyst, and Fall, initial encounter were also pertinent to this visit  Patient Condition: The patient has been stabilized such that within reasonable medical probability, no material deterioration of the patient condition or the condition of the unborn child(roberto) is likely to result from the transfer    Reason for Transfer: Level of Care needed not available at this facility    Transfer Requirements: [facility] ED    · Space available and qualified personnel available for treatment as acknowledged by    · Agreed to accept transfer and to provide appropriate medical treatment as acknowledged by       Dr Obey Brown  · Appropriate medical records of the examination and treatment of the patient are provided at the time of transfer   [address] [facility] _______  · Transfer will be performed by qualified personnel from    and appropriate transfer equipment as required, including the use of necessary and appropriate life support measures      Provider Certification: I have examined the patient and explained the following risks and benefits of being transferred/refusing transfer to the patient/family:  General risk, such as traffic hazards, adverse weather conditions, rough terrain or turbulence, possible failure of equipment (including vehicle or aircraft), or consequences of actions of persons outside the control of the transport personnel, Unanticipated needs of medical equipment and personnel during transport, Risk of worsening condition, The possibility of a transport vehicle being unavailable      Based on these reasonable risks and benefits to the patient and/or the unborn child(roberto), and based upon the information available at the time of the patients examination, I certify that the medical benefits reasonably to be expected from the provision of appropriate medical treatments at another medical facility outweigh the increasing risks, if any, to the individuals medical condition, and in the case of labor to the unborn child, from effecting the transfer      X____________________________________________ DATE 08/07/21        TIME_______      ORIGINAL - SEND TO MEDICAL RECORDS   COPY - SEND WITH PATIENT DURING TRANSFER

## 2021-08-08 NOTE — PROGRESS NOTES
[address] [facility]  Progress Note Brina Valadez 4/12/1929, 80 y o  male MRN: [id number]  Unit/Bed#: [facility] 829-01 Encounter: [id number]  Primary Care Provider: No primary care provider on file  Date and time admitted to hospital: 8/7/2021  4:23 PM    Compression fracture of C7 vertebra ([address] )  Assessment & Plan  S/p mechanical fall with head strike and no loss of consciousness  Follow-up Neurosurgery consultation  C-spine precaution  Follow-up upright C-spine x-rays  Monitor neurovascular status    Fracture of fourth cervical vertebra [facility])  Assessment & Plan  S/p mechanical fall with head strike and no loss of consciousness  Follow-up Neurosurgery consultation  C-spine precaution  Follow-up upright C-spine x-rays  Monitor neurovascular status    HLD (hyperlipidemia)  Assessment & Plan  Continue atorvastatin    HTN (hypertension)  Assessment & Plan  Continue home meds and prn   Monitor BP goal under 545 systolics    Urge incontinence of urine  Assessment & Plan  Continue flomax    * Fall  Assessment & Plan  S/p mechanical fall with head strike and no loss of consciousness  Sustained C4-C7 fractures  Follow-up PT and OT evaluation        TERTIARY TRAUMA SURVEY NOTE    Prophylaxis: Sequential compression device (Venodyne)  and Heparin    Disposition: PT/OT eval and neurosurgery consult pending    Code status:  Level 3 - DNAR and DNI    Consultants:  Neurosurgery    Is the patient 65 years or older?: YES:    1  Before the illness or injury that brought you to the Emergency, did you need someone to help you on a regular basis? 1=Yes   2  Since the illness or injury that brought you to the Emergency, have you needed more help than usual to take care of yourself? 1=Yes   3  Have you been hospitalized for one or more nights during the past 6 months (excluding a stay in the Emergency Department)? 1=Yes   4  In general, do you see well? 1=No   5   In general, do you have serious problems with your memory? 1=Yes   6  Do you take more than three different medications everyday? 1=Yes   TOTAL   6     Did you order a geriatric consult if the score was 2 or greater?: yes          SUBJECTIVE:     Transfer from: [facility]  Outside Films Received: yes  Tertiary Exam Due on: 08/08/21      Mechanism of Injury:Fall    Details related to Injury: +LOC:  no +head strike  Tripped on carpet and fell, struck his head on oven  Wife assisted him with getting up  Pt presented the following day to [facility] due to worsening neck pain  Chief Complaint: "can you help me sit up, I keep wetting myself"    HPI/Last 24 hour events: Pt incontinent overnight of urine  Nursing attempted condom catheter with no success       Active medications:           Current Facility-Administered Medications:     acetaminophen (TYLENOL) tablet 975 mg, 975 mg, Oral, Q8H CHAU, 975 mg at 08/07/21 2235    amLODIPine (NORVASC) tablet 10 mg, 10 mg, Oral, Daily    atorvastatin (LIPITOR) tablet 20 mg, 20 mg, Oral, Daily    docusate sodium (COLACE) capsule 100 mg, 100 mg, Oral, BID, 100 mg at 08/07/21 2236    heparin (porcine) subcutaneous injection 5,000 Units, 5,000 Units, Subcutaneous, Q8H [address], 5,000 Units at 08/08/21 0655 **AND** [COMPLETED] Platelet count, , , Once    hydrALAZINE (APRESOLINE) injection 10 mg, 10 mg, Intravenous, Q6H PRN    HYDROmorphone HCl (DILAUDID) injection 0 2 mg, 0 2 mg, Intravenous, Q4H PRN    insulin lispro (HumaLOG) 100 units/mL subcutaneous injection 1-6 Units, 1-6 Units, Subcutaneous, 4 times day **AND** Fingerstick Glucose (POCT), , , 4 times day    insulin lispro (HumaLOG) 100 units/mL subcutaneous injection 1-6 Units, 1-6 Units, Subcutaneous, HS, 1 Units at 08/07/21 2237    metoprolol tartrate (LOPRESSOR) tablet 50 mg, 50 mg, Oral, Q12H CHAU, 50 mg at 08/07/21 2236    naloxone (NARCAN) 0 04 mg/mL syringe 0 04 mg, 0 04 mg, Intravenous, Q1MIN PRN    ondansetron (ZOFRAN) injection 4 mg, 4 mg, Intravenous, Q6H PRN   oxyCODONE (ROXICODONE) IR tablet 2 5 mg, 2 5 mg, Oral, Q4H PRN    oxyCODONE (ROXICODONE) IR tablet 5 mg, 5 mg, Oral, Q4H PRN    polyethylene glycol (MIRALAX) packet 17 g, 17 g, Oral, Daily    senna (SENOKOT) tablet 17 2 mg, 2 tablet, Oral, Daily    tamsulosin (FLOMAX) capsule 0 4 mg, 0 4 mg, Oral, Daily With Dinner, 0 4 mg at 08/07/21 2236      OBJECTIVE:     Vitals:   Vitals:    08/08/21 0734   BP: (!) 172/87   Pulse: 74   Resp: 20   Temp: 98 °F (36 7 °C)   SpO2: 94%       Physical Exam:   Physical Exam  Constitutional:       General: He is not in acute distress  Appearance: He is not ill-appearing or toxic-appearing  HENT:      Head: Normocephalic  Nose: Nose normal       Mouth/Throat:      Mouth: Mucous membranes are dry  Eyes:      Extraocular Movements: Extraocular movements intact  Neck:      Comments: C-collar in place  Cardiovascular:      Rate and Rhythm: Normal rate and regular rhythm  Pulmonary:      Effort: Pulmonary effort is normal  No respiratory distress  Abdominal:      General: There is no distension  Palpations: Abdomen is soft  Tenderness: There is no abdominal tenderness  Skin:     General: Skin is warm and dry  Neurological:      Mental Status: He is alert and oriented to person, place, and time  Comments: Moving all extremities   Psychiatric:         Mood and Affect: Mood normal          Behavior: Behavior normal            I/O:   I/O       08/06 0701 - 08/07 0700 08/07 0701 - 08/08 0700 08/08 0701 - 08/09 0700    Urine  100     Total Output  100     Net  -100            Unmeasured Urine Occurrence  4 x           Invasive Devices: Invasive Devices     Peripheral Intravenous Line            Peripheral IV 08/07/21 Left Antecubital <1 day    Peripheral IV 08/07/21 Left Wrist <1 day                  Imaging:   CT head without contrast    Result Date: 8/7/2021  Impression: 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   No acute intracranial abnormality  Workstation performed: [id number]     CT chest without contrast    Result Date: 8/7/2021  Impression: 1  No traumatic thoracic injury  2   Numerous cystic lesions in the pancreas  Correlate for cystic pancreatic neoplasm  For simple cyst(s) 2 cm or greater recommend gastroenterology and/or surgical oncology consult  EUS is likely now warranted  Preferred imaging modality: abdomen MRI  and MRCP with and without IV contrast, or triple phase abdomen CT with IV contrast, or abdomen MRI and MRCP without IV contrast  The recommendations regarding pancreatic findings assumes that patient does not have family history of pancreatic cancer nor have any symptoms potentially attributable to pancreatic cystic lesions (hyperamylasemia, recent-onset diabetes, severe epigastric pain, weight loss, steatorrhea, or jaundice ) If these conditions are not true, then management should be deferred to judgement of specialists such as gastroenterologists or oncologic surgeons  Recommendations are based on recent consensus statements on management of pancreatic cystic lesions from [address] [place] [address] Gastroenterology Association, [facility], the journal Pancreatology, and our own institutional consensus  3   Multiple noncalcified pulmonary nodules bilaterally, largest measuring 10 mm right lower lobe  Differential would include primary versus metastatic neoplasm  Based on current Fleischner Society 2017 Guidelines on incidental pulmonary nodule, either  PET/CT scan evaluation, tissue sampling or short term interval followup non-contrast CT followup (initially in 3 months) may be considered appropriate  The study was marked in [facility] for immediate notification  Workstation performed: [id number]     CT cervical spine without contrast    Result Date: 8/7/2021  Impression: 1  Minimally displaced fractures involving the spinous processes of the C4 through C7 vertebral bodies   2   Mild irregularity of the anterior, inferior endplate of C7, suspicious for mild compression fracture  No bony retropulsion  3   Severe degenerative changes of the cervical spine, most pronounced C5-C6 as well as the atlantoaxial articulation  Given the history of trauma, consider follow-up MRI of the cervical spine to exclude cord contusion, secondary to hyperflexion injury  Recommend follow-up neurosurgical or orthopedic spine surgery consultation  Consider follow-up MRI of the cervical spine to exclude cord contusion  I personally discussed this study with Nannette Donohue on 8/7/2021 at 11:28 AM  Workstation performed: [id number]     CT recon only thoracic spine    Result Date: 8/7/2021  Impression: Degenerative changes of the thoracic spine  No acute fracture or traumatic subluxation   Workstation performed: [id number]       Labs:   CBC:   Lab Results   Component Value Date    WBC 7 82 08/08/2021    HGB 11 4 (L) 08/08/2021    HCT 32 6 (L) 08/08/2021    MCV 92 08/08/2021     08/08/2021    MCH 32 0 08/08/2021    MCHC 35 0 08/08/2021    RDW 12 3 08/08/2021    MPV 9 9 08/08/2021    NRBC 0 08/08/2021     CMP:   Lab Results   Component Value Date     (H) 08/08/2021    CO2 25 08/08/2021    BUN 17 08/08/2021    CREATININE 1 12 08/08/2021    CALCIUM 9 0 08/08/2021    EGFR 57 08/08/2021

## 2021-08-08 NOTE — PHYSICAL THERAPY NOTE
PHYSICAL THERAPY Cancellation NOTE    Patient Name: Sanaz Sands  RGJSH'[id number] Date: 8/8/2021     PT orders received  Chart reviewed  Pt s/p fall with C4, C7 fx  Pt pending neurosx consult at this time  Will continue to follow and attempt to see as appropriate  Thank you for the consultation       Roney Lawson, PT, DPT 8/8/2021

## 2021-08-08 NOTE — PLAN OF CARE
Problem: MOBILITY - ADULT  Goal: Maintain or return to baseline ADL function  Description: INTERVENTIONS:  -  Assess patient's ability to carry out ADLs; assess patient's baseline for ADL function and identify physical deficits which impact ability to perform ADLs (bathing, care of mouth/teeth, toileting, grooming, dressing, etc )  - Assess/evaluate cause of self-care deficits   - Assess range of motion  - Assess patient's mobility; develop plan if impaired  - Assess patient's need for assistive devices and provide as appropriate  - Encourage maximum independence but intervene and supervise when necessary  - Involve family in performance of ADLs  - Assess for home care needs following discharge   - Consider OT consult to assist with ADL evaluation and planning for discharge  - Provide patient education as appropriate  Outcome: Progressing  Goal: Maintains/Returns to pre admission functional level  Description: INTERVENTIONS:  - Perform BMAT or MOVE assessment daily    - Set and communicate daily mobility goal to care team and patient/family/caregiver     - Collaborate with rehabilitation services on mobility goals if consulted  - Out of bed for toileting  - Record patient progress and toleration of activity level   Outcome: Progressing     Problem: Potential for Falls  Goal: Patient will remain free of falls  Description: INTERVENTIONS:  - Educate patient/family on patient safety including physical limitations  - Instruct patient to call for assistance with activity   - Consult OT/PT to assist with strengthening/mobility   - Keep Call bell within reach  - Keep bed low and locked with side rails adjusted as appropriate  - Keep care items and personal belongings within reach  - Initiate and maintain comfort rounds  - Make Fall Risk Sign visible to staff  - Apply yellow socks and bracelet for high fall risk patients  - Consider moving patient to room near nurses station  Outcome: Progressing     Problem: Prexisting or High Potential for Compromised Skin Integrity  Goal: Skin integrity is maintained or improved  Description: INTERVENTIONS:  - Identify patients at risk for skin breakdown  - Assess and monitor skin integrity  - Assess and monitor nutrition and hydration status  - Monitor labs   - Assess for incontinence   - Turn and reposition patient  - Assist with mobility/ambulation  - Relieve pressure over bony prominences  - Avoid friction and shearing  - Provide appropriate hygiene as needed including keeping skin clean and dry  - Evaluate need for skin moisturizer/barrier cream  - Collaborate with interdisciplinary team   - Patient/family teaching  - Consider wound care consult   Outcome: Progressing

## 2021-08-08 NOTE — ASSESSMENT & PLAN NOTE
S/p mechanical fall with head strike and no loss of consciousness  Sustained C4-C7 fractures  Follow-up PT and OT evaluation

## 2021-08-08 NOTE — ASSESSMENT & PLAN NOTE
S/p mechanical fall with head strike and no loss of consciousness  Follow-up Neurosurgery consultation  C-spine precaution  Follow-up upright C-spine x-rays  Monitor neurovascular status

## 2021-08-08 NOTE — OCCUPATIONAL THERAPY NOTE
Occupational Therapy Cancellation         Patient Name: Michelle Mcleod  [id number] Date: 8/8/2021 08/08/21 1507   OT Last Visit   OT Visit Date 08/08/21   Note Type   Note type Evaluation   Cancel Reasons Other       OT orders received  Chart reviewed  Pt s/p fall with C4, C7 fx  Pt pending neurosx consult at this time  Will continue to follow and see pt as appropriate post neurosx input      Christy Zuñiga MS, OTR/L

## 2021-08-08 NOTE — PLAN OF CARE
Problem: MOBILITY - ADULT  Goal: Maintain or return to baseline ADL function  Description: INTERVENTIONS:  -  Assess patient's ability to carry out ADLs; assess patient's baseline for ADL function and identify physical deficits which impact ability to perform ADLs (bathing, care of mouth/teeth, toileting, grooming, dressing, etc )  - Assess/evaluate cause of self-care deficits   - Assess range of motion  - Assess patient's mobility; develop plan if impaired  - Assess patient's need for assistive devices and provide as appropriate  - Encourage maximum independence but intervene and supervise when necessary  - Involve family in performance of ADLs  - Assess for home care needs following discharge   - Consider OT consult to assist with ADL evaluation and planning for discharge  - Provide patient education as appropriate  Outcome: Progressing  Goal: Maintains/Returns to pre admission functional level  Description: INTERVENTIONS:  - Perform BMAT or MOVE assessment daily    - Set and communicate daily mobility goal to care team and patient/family/caregiver  - Collaborate with rehabilitation services on mobility goals if consulted  - Perform Range of Motion 3 times a day  - Reposition patient every 3 hours    - Dangle patient 3 times a day  - Stand patient 3 times a day  - Ambulate patient 3 times a day  - Out of bed to chair 3 times a day   - Out of bed for meals 3  Problem: Potential for Falls  Goal: Patient will remain free of falls  Description: INTERVENTIONS:  - Educate patient/family on patient safety including physical limitations  - Instruct patient to call for assistance with activity   - Consult OT/PT to assist with strengthening/mobility   - Keep Call bell within reach  - Keep bed low and locked with side rails adjusted as appropriate  - Keep care items and personal belongings within reach  - Initiate and maintain comfort rounds  - Make Fall Risk Sign visible to staff  - Offer Toileting every 2 Hours, in advance of need  - Initiate/Maintain bed alarm  - Apply yellow socks and bracelet for high fall risk patients  - Consider moving patient to room near nurses station  Outcome: Progressing    times a day  - Out of bed for toileting  - Record patient progress and toleration of activity level   Outcome: Progressing

## 2021-08-09 PROBLEM — S12.400A FRACTURE OF FIFTH CERVICAL VERTEBRA (HCC): Status: ACTIVE | Noted: 2021-01-01

## 2021-08-09 NOTE — PLAN OF CARE
Problem: PHYSICAL THERAPY ADULT  Goal: Performs mobility at highest level of function for planned discharge setting  See evaluation for individualized goals  Description: Treatment/Interventions: Functional transfer training, LE strengthening/ROM, Elevations, Therapeutic exercise, Endurance training, Patient/family training, Cognitive reorientation, Equipment eval/education, Bed mobility, Gait training          See flowsheet documentation for full assessment, interventions and recommendations  Note: Prognosis: Fair  Problem List: Decreased strength, Decreased endurance, Impaired balance, Decreased mobility, Decreased cognition, Decreased safety awareness, Pain, Orthopedic restrictions  Assessment: Pt is a 80 y o  male seen for PT evaluation [facility]  Pt was admitted with a primary dx of: Fall resulting in C4-C7 spinous process fx (cleared for [employer] collar by trauma), compression fx of C7  PT now consulted for assessment of mobility and d/c needs  Pt with Up in chair orders  Pts current comorbidities effecting treatment include: HTN, Urinary urge incontinence, Bladder ca s/p radiation  Pts current clinical presentation is Unstable/ Unpredictable (high complexity) due to Ongoing medical management for primary dx, Increased reliance on more restrictive AD compared to baseline, Decreased activity tolerance compared to baseline, Fall risk, Increased assistance needed from caregiver at current time, Cog status, Trending lab values  Prior to admission, pt was independent with household mobility with RW per patient, poor historian, will need clarification  Upon evaluation, pt currently is requiring modA for bed mobility; modA for transfers and Elli for ambulation 150 ft w/ RW   Pt presents at PT eval functioning below baseline and currently w/ overall mobility deficits 2* to: BLE weakness, impaired balance, decreased endurance, gait deviations, pain, decreased activity tolerance compared to baseline, decreased functional mobility tolerance compared to baseline, decreased safety awareness, fall risk, decreased cognition  Pt currently at a fall risk 2* to impairments listed above  Pt will continue to benefit from skilled acute PT interventions to address stated impairments; to maximize functional mobility; for ongoing pt/ family training; and DME needs  At conclusion of PT session pt returned back in chair and chair alarm engaged with phone and call bell within reach  Pt denies any further questions at this time  Recommend IP rehab upon hospital D/C  PT Discharge Recommendation: Post acute rehabilitation services     PT - OK to Discharge: Yes (to IP rehab)    See flowsheet documentation for full assessment

## 2021-08-09 NOTE — PHYSICAL THERAPY NOTE
Physical Therapy Evaluation    Patient's Name: Merline Mcgregor    Admitting Diagnosis  Injury, unspecified, initial encounter [[id number]]    Problem List  Patient Active Problem List   Diagnosis    Personal history of prostate cancer    Urge incontinence of urine    HTN (hypertension)    HLD (hyperlipidemia)    [facility] [address] [facility]    Skin cancer    Fall    Fracture of fourth cervical vertebra ([facility] [address] [facility]    Compression fracture of C7 vertebra (HCC)    Fracture of fifth cervical vertebra ([facility] [address] )       Past Medical History  Past Medical History:   Diagnosis Date    HLD (hyperlipidemia)     HTN (hypertension)     Prostate CA ([facility] [address] [facility]     Skin cancer        Past Surgical History  Past Surgical History:   Procedure Laterality Date    CYSTOSCOPY          08/09/21 1138   PT Last Visit   PT Visit Date 08/09/21   Note Type   Note type Evaluation   Pain Assessment   Pain Assessment Tool Pain Assessment not indicated - pt denies pain   Pain Score No Pain   Home Living   Type of [address] [place] [address] One level;Stairs to enter with rails   Home Equipment Walker   Prior Function   Level of [place] Independent with ADLs and functional mobility   Lives With Spouse   Falls in the last 6 months 1 to 4  (at least 1 leading to admission)   Comments Pt is a poor historian, states he resides with his wife, he fell in the kitchen, utilizes RW at home  Limited information available in chart, will discuss with CM  Restrictions/Precautions   Weight Bearing Precautions Per Order No   Braces or Orthoses C/S Collar  ([employer])   Other Precautions Chair Alarm;Cognitive; Fall Risk;Pain;Multiple lines   General   Family/Caregiver Present No   Cognition   Overall Cognitive Status Impaired   Attention Attends with cues to redirect   Orientation Level Oriented to person;Disoriented to time;Disoriented to situation;Oriented to place  (grossly to place, aware of hospital environment)   Following Commands Follows one step commands with increased time or repetition   Comments Pt pleasant and cooperative throughout session, poor safety awareness, limited insight to impairments, extra time for processing   RLE Assessment   RLE Assessment WFL  (Grossly 4/5)   LLE Assessment   LLE Assessment WFL  (Grossly 4/5)   Light Touch   RLE Light Touch Grossly intact   LLE Light Touch Grossly intact   Bed Mobility   Supine to Sit 3  Moderate assistance   Additional items Assist x 1; Increased time required;Verbal cues   Additional Comments VC's for sequencing of bed mobility, initial Elli to maintain sitting balance, improved with time in position   Transfers   Sit to Stand 3  Moderate assistance   Additional items Assist x 1; Increased time required;Verbal cues   Stand to Sit 3  Moderate assistance   Additional items Assist x 1; Increased time required;Verbal cues   Additional Comments with RW, VC's for orientation to COG upon standing, slight posterior lean improved with distancel   Ambulation/Elevation   Gait pattern Decreased foot clearance; Short stride; Forward Flexion;Narrow LETA   Gait Assistance 4  Minimal assist   Additional items Assist x 1   Assistive Device Rolling walker   Distance 150 ft, VC's for proximity to RW, Elli for RW management/control especially with rotational turns   Balance   Static Sitting Fair   Dynamic Sitting Poor +   Static Standing Poor +   Dynamic Standing Poor +   Ambulatory Poor +   Activity Tolerance   Activity Tolerance Patient limited by fatigue   Nurse Made Aware RN updated  Chair alarm engaged, OT present   Assessment   Prognosis Fair   Problem List Decreased strength;Decreased endurance; Impaired balance;Decreased mobility; Decreased cognition;Decreased safety awareness;Pain;Orthopedic restrictions   Assessment Pt is a 80 y o  male seen for PT evaluation s/p admit to [facility] on 8/7/2021  Pt was admitted with a primary dx of:  Fall resulting in C4-C7 spinous process fx (cleared for [employer] collar by trauma), compression fx of C7  PT now consulted for assessment of mobility and d/c needs  Pt with Up in chair orders  Pts current comorbidities effecting treatment include: HTN, Urinary urge incontinence, Bladder ca s/p radiation  Pts current clinical presentation is Unstable/ Unpredictable (high complexity) due to Ongoing medical management for primary dx, Increased reliance on more restrictive AD compared to baseline, Decreased activity tolerance compared to baseline, Fall risk, Increased assistance needed from caregiver at current time, Cog status, Trending lab values  Prior to admission, pt was independent with household mobility with RW per patient, poor historian, will need clarification  Upon evaluation, pt currently is requiring modA for bed mobility; modA for transfers and Elli for ambulation 150 ft w/ RW  Pt presents at PT eval functioning below baseline and currently w/ overall mobility deficits 2* to: BLE weakness, impaired balance, decreased endurance, gait deviations, pain, decreased activity tolerance compared to baseline, decreased functional mobility tolerance compared to baseline, decreased safety awareness, fall risk, decreased cognition  Pt currently at a fall risk 2* to impairments listed above  Pt will continue to benefit from skilled acute PT interventions to address stated impairments; to maximize functional mobility; for ongoing pt/ family training; and DME needs  At conclusion of PT session pt returned back in chair and chair alarm engaged with phone and call bell within reach  Pt denies any further questions at this time  Recommend IP rehab upon hospital D/C  Goals   Patient Goals to walk   [facility] Expiration Date 08/23/21   Short Term Goal #1 In 14 days pt will be able to: 1  Demonstrate ability to perform all aspects of bed mobility independently to increase functional independence  2  Perform functional transfers with RW and supervision to facilitate safe return to previous living environment  3   Ambulate 150 ft with RW and supervision with stable vitals to improve safety with household distances and reduce fall risk  4  Improve LE strength grades by 1 to increase ease of functional mobility with transfers and gait  5  Pt will demonstrate improved balance by one grade in order to decrease risk of falls  6  Climb 3 steps with supervision and 1 HR to simulate entrance to home  PT Treatment Day 0   Plan   Treatment/Interventions Functional transfer training;LE strengthening/ROM; Elevations; Therapeutic exercise; Endurance training;Patient/family training;Cognitive reorientation;Equipment eval/education; Bed mobility;Gait training   PT Frequency Other (Comment)  (3-5x/week)   Recommendation   PT Discharge Recommendation Post acute rehabilitation services   PT - OK to Discharge Yes  (to IP rehab)   [place] [address] in Bed Without Bedrails 3   Lying on Back to Sitting on Edge of Flat Bed 3   Moving Bed to Chair 2   Standing Up From Chair 2   Walk in Room 3   Climb 3-5 Stairs 2   Basic Mobility Inpatient Raw Score 15   Basic Mobility Standardized Score 36 97       Robb Oreilly, PT, DPT

## 2021-08-09 NOTE — OCCUPATIONAL THERAPY NOTE
Occupational Therapy Evaluation     Patient Name: Olga VALLADARES Date: 8/9/2021  Problem List  Principal Problem:    Fall  Active Problems:    Urge incontinence of urine    HTN (hypertension)    HLD (hyperlipidemia)    Fracture of fourth cervical vertebra (HCC)    Compression fracture of C7 vertebra (HCC)    Fracture of fifth cervical vertebra [facility])    Past Medical History  Past Medical History:   Diagnosis Date    HLD (hyperlipidemia)     HTN (hypertension)     Prostate CA ([address] )     Skin cancer      Past Surgical History  Past Surgical History:   Procedure Laterality Date    CYSTOSCOPY               08/09/21 1155   OT Last Visit   OT Visit Date 08/09/21   Note Type   Note type Evaluation  (and TX)   Restrictions/Precautions   Weight Bearing Precautions Per Order No   Other Precautions Fall Risk;Pain;Spinal precautions;Cognitive; Chair Alarm; Bed Alarm  (Aspen Collar)   Pain Assessment   Pain Assessment Tool Pain Assessment not indicated - pt denies pain   Pain Score No Pain   Home Living   Additional Comments Pt poor historian, Per Pt report he lives with his wife  Defer home set up to CM  Prior Function   Comments Pt poor historian, per Pt report he recieves assistance with LB ADLs  Defer PLOF to CM  Lifestyle   Autonomy Pt reports he receives some assistance with ADLs  Reciprocal Relationships Pt with wife  Service to Others 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,   Intrinsic Gratification Pt enjoys walking  Psychosocial   Psychosocial (WDL) WDL   ADL   Where Assessed Chair   Eating Assistance 6  Modified independent   Grooming Assistance 4  Minimal Assistance   Grooming Deficit Increased time to complete;Setup;Steadying;Verbal cueing;Supervision/safety; Wash/dry face; Wash/dry hands   UB Bathing Assistance 3  Moderate Assistance   LB Bathing Assistance 2  Maximal Assistance   UB Dressing Assistance 3  Moderate Assistance   LB Dressing Assistance 2  Maximal Assistance   LB Dressing Deficit Don/doff R sock; Don/doff L sock   Toileting Assistance  3  Moderate Assistance   Functional Assistance 2  Maximal Assistance   Bed Mobility   Supine to Sit Unable to assess   Sit to Supine Unable to assess   Additional Comments Pt greeted in hallway performing functional mobility with PT  Transfers   Sit to Stand 3  Moderate assistance   Additional items Assist x 1; Increased time required;Verbal cues   Stand to Sit 3  Moderate assistance   Additional items Assist x 1; Increased time required;Verbal cues   Functional Mobility   Functional Mobility 3  Moderate assistance   Additional Comments Pt completed functional mobility with RW at min Ax1  Balance   Static Sitting Fair   Dynamic Sitting Fair -   Static Standing Fair -   Dynamic Standing Fair -   Ambulatory Poor +   Activity Tolerance   Activity Tolerance Patient limited by fatigue   Medical Staff Made Aware Hand off from PT  Nurse Made Aware RN cleared  RUE Assessment   RUE Assessment WFL  (3-/5)   LUE Assessment   LUE Assessment WFL  (3-/5 )   Hand Function   Gross Motor Coordination Functional   Fine Motor Coordination Functional   Sensation   Light Touch No apparent deficits   Cognition   Overall Cognitive Status Impaired   Arousal/Participation Alert; Cooperative   Attention Attends with cues to redirect   Orientation Level Oriented to person;Disoriented to situation;Disoriented to time;Disoriented to place   Memory Decreased short term memory;Decreased long term memory;Decreased recall of biographical information   Following Commands Follows one step commands with increased time or repetition   Comments Pt pleasant and cooperative during OT session  Pt unreliable historian  Pt presented with decreased STM/LTM  Pt required increased time for processing/response  Pt required VCs/TCs/encouragement to maximize participation in ADL session  Assessment   Limitation Decreased ADL status; Decreased UE strength;Decreased UE ROM; Decreased Safe judgement during ADL;Decreased cognition;Decreased endurance;Decreased self-care trans;Decreased high-level ADLs   Prognosis Fair   Assessment Pt is a 79 yo Male who presented to B on 8/7/2021 s/p fall  Pt tripped and hit head on stove  Pt with diagnosis of C4-C7 spinous fx  Pt to wear Aspen collar at all times and with spinal precautions  Pt  has a past medical history of HLD (hyperlipidemia), HTN (hypertension), Prostate CA ([address] ), and Skin cancer  Pt greeted up in recliner chair (hand off from PT) for OT evaluation on 8/9/2021  Pt poor historian, differ home set up/PLOF to CM  Pt pleasant and cooperative during OT session  Pt unreliable historian  Pt presented with decreased STM/LTM  Pt required increased time for processing/response  Pt required VCs/TCs/encouragement to maximize participation in ADL session  Pt completes UB ADLs with mod A and LB ADLs with max A  Pt completes functional transfers at mod Ax1 and functional mobility with RW at mod Ax1  Limitations that impact functional performance include decreased ADL status, decreased UE ROM, decreased UE strength, decreased safe judgement during ADLs, decreased cognition, decreased endurance, decreased self care transfers and decreased high level ADLs  Occupational performance areas to address ADL retraining, functional transfer training, UE strengthening/ROM, endurance training, cognitive reorientation, Pt/caregiver education, equipment evaluation/education, compensatory technique education, energy conservation and activity engagement   Pt would benefit from continued skilled OT services while in hospital to maximize independence with ADLs  Will continue to follow Pt's progress  Pt would benefit from post acute rehabilitation services upon DC to maximize safety and independence with ADLs and functional tasks of choice  Goals   Patient Goals To walk more  LTG Time Frame 10-14   Long Term Goal #1 See goals listed below     Plan   Treatment Interventions ADL retraining;Functional transfer training;UE strengthening/ROM; Endurance training;Patient/family training;Cognitive reorientation;Equipment evaluation/education; Compensatory technique education; Fine motor coordination activities; Energy conservation; Activityengagement   Goal Expiration Date 08/23/21   OT Treatment Day 1   OT Frequency 3-5x/wk   Additional Treatment Session   Start Time 1142   End Time 1155   Treatment Assessment Pt complete follow up treatment focusing on LB dressing and hygiene/grooming  Pt requires min A for washing/drying face and hands  Pt with decreased initiation of movement and required VCs/TCs to initiate task  Pt completed LB donning of socks at max A and demonstrated decreased LE flexibility  Pt states someone assists with RLE and he can perform LLE LB dressing  Additional Treatment Day 1   Recommendation   OT Discharge Recommendation Post acute rehabilitation services   OT - OK to Discharge Yes   Additional Comments  The patient's raw score on the AM-PAC Daily Activity inpatient short form is 16, standardized score is 35 96, less than 39 4  Patients at this level are likely to benefit from discharge to post-acute rehabilitation services  Please refer to the recommendation of the Occupational Therapist for safe discharge planning  AM-PAC Daily Activity Inpatient   Lower Body Dressing 2   Bathing 2   Toileting 2   Upper Body Dressing 3   Grooming 3   Eating 4   Daily Activity Raw Score 16   Daily Activity Standardized Score (Calc for Raw Score >=11) 35 96   Modified [place] Scale   Modified Judi Scale 4     Goals:  1  Pt will complete UB ADLs with S in order to maximize participation with ADLs  2  Pt will complete LB ADLs with min A in order to maximize safety with ADLs  3  Pt will complete toileting routine (transfer, hygiene, and clothing management) with min A in order to return to prior level of function    4  Pt will complete bed mobility with S in order to maximize participation with ADLs  5  Pt will complete functional transfers at S level in order to increase participation with ADLs  6  Pt will increase dynamic standing balance to F in order to increase safety with ADLs  7  Pt will increase standing tolerance x5 min in order to increase participation with ADLs  8  Pt will complete functional mobility with AD PRN for item retrieval task at [place] level in order to increase participation with ADLs  9  Pt will be attentive 100% of the time for ongoing functional/formal cognitive assessment to assist with safe dc planning PRN  10  Pt will be AAOx4 during OT session in order to maximize safety during ADLs  11  Pt will be attentive for x5 min during OT session  12  Pt will follow 1-step 100% of time during OT session       Orma Felty, MS, OTR/L

## 2021-08-09 NOTE — CONSULTS
Consultation - Geriatric Medicine   Malathi Polk 80 y o  male MRN: [id number]  Unit/Bed#: [facility] 829-01 Encounter: [id number]      Assessment/Plan     Acute metabolic encephalopathy  -evidence by confusion, restlessness, and impulsivity beyond baseline requiring soft waist belt Posey restraint  -likely multifactorial including fall with traumatic injuries, acute pain, and unfamiliar hospital environment superimposed on suspected underlying mild cognitive impairment  -no acute intracranial abnormality noted on CT head on admission and afebrile with no leukocytosis to suggest infectious etiology  -continue to ensure acute pain is well controlled  -maintain calm and quiet environment reduce risk overstimulation  -reorient and redirect frequently  -wife reports will be at bedside later today which will be beneficial for familiarity and reassurance  -monitor for fecal and urinary retention  -avoid hypo/hyperglycemia  Monitor for metabolic derangements and correct as arise  -encourage use of glasses and hearing aids as appropriate    Ambulatory dysfunction with fall  -reportedly mechanical 8/7/21  -(+) head strike (-) loss of consciousness  -injuries as outlined below  -requires use of walker for ambulation at baseline  -history recurrent falls with multiple over past year  -remains high risk future falls due to age, impaired vision, deconditioning, history of falls, impulsivity and poor safety awareness  -would benefit from home fall risk assessment and consideration of personal fall alert system  -PT and OT pending  -continue fall precautions    Fracture C4-C7 spinous processes  -s/p fall at home  -noted on CT C-spine 8/7/21  -continue C-spine precautions  -neurovascular checks per protocol  -continue acute pain control  -management per trauma    Acute pain due to trauma  -recommend pain control per Geriatric pain protocol:  Tylenol 975mg Q8H scheduled  Roxicodone 2 5mg Q4H PRN moderate pain  Roxicodone 5mg Q4H PRN severe pain  Dilaudid 0 2mg Q4H PRN  -consider adjuncts such as Gabapentin 100mg HS and lidocaine patch topically  -encourage addition of non-pharmacologic pain treatment including ice and frequent repositioning  -recommend  bowel regimen to prevent and treat constipation due to increased risk with acute pain and opiate pain medications    Urge incontinence of urine  -chronic due to history prostate cancer s/p radiation therapy  -encourage timed toileting to reduce risk leakage  -limit fluid intake within 2 hours of bed to reduce overnight awakenings to void  -continue home Tamsulosin-monitor for orthostatic hypotension with use  -continue close outpatient follow-up with Urology    DM-II  -recommend checking A1c  -maintained on Januvia as outpatient-100 mg daily per wife  -blood sugars during hospitalization 140-180 to reduce risk hypoglycemia  -continue age-appropriate routine diabetic screenings and cares and close outpatient follow-up with PCP    Cognitive screening  -alert awake, oriented to self, acutely encephalopathic at time evaluation  -wife reports short-term memory issues consistent with at least mild cognitive impairment however no prior cognitive testing on record for review  -CTH on admission reveals at least moderate chronic microangiopathic changes  -consider checking TSH and B12  -consider MOCA with OT once encephalopathy resolved  -encourage use of corrective lenses and hearing aids of appropriate times to reduce risk sensory impairment contributing to further social isolation, encephalopathy, confusion, and cognitive decline  -may benefit from comprehensive geriatric assessment outpatient to establish baseline as well as identify and mobilize additional community-based resources as indicated and appropriate    Impaired Vision  -recommend use of corrective lenses at all appropriate times  -encourage adequate lighting and encourage use of assistance with ambulation  -keep personal belongings close to person to avoid reaching  -encourage appropriate footwear at all times    Impaired Hearing  -Encourage use of hearing aids at all appropriate times  -encourage providers and caregivers to speak slowly and clearly directly to patient  -minimize background noise to encourage patient engagement  -consider use of hearing amplifier to reduce risk of straining to hear if hearing aids are not present or are not sufficient     Deconditioning/debility/frailty  -clinical frailty scale stage 5, mildly frail  -multifactorial including age, history prostate cancer s/p radiation therapy, short-term memory loss consistent with mild cog impairment multiple additional chronic medical co-morbidities  -encourage well-balanced nutrition  -continue optimization chronic medical conditions  -continue to ensure that mood/behavior symptoms well controlled as untreated may impact patient's response to therapies as well as overall sense of well-being and quality of life    Home medication review  Personally confirmed with wife:    Amlodipine 10mg daily  Irbesartan 150mg daily  Lopressor 50mg BID  Atorvastatin 20mg daily  Calcium 500mg daily  Tamsulosin 0 4mg daily   Januvia 100mg daily (confirmed wife states dose 100mg)  Furosemide - wife uncertain as to dose  Aspirin 81 mg daily    Care coordination: rounded with Cedric Akbar (RN)     History of Present Illness   Physician Requesting Consult: Colleen Lopez MD  Reason for Consult / Principal Problem:  Fall  Hx and PE limited by: N/A  Additional history obtained from: Chart review and patient evaluation, spoke with wife Renetta Green on phone [phone]    HPI: Jc Friend is a 80y o  year old male who presents with hypertension, urinary urge incontinence, hyperlipidemia, and history of bladder cancer s/p radiation therapy who is admitted to the trauma service with ambulatory dysfunction and fall found to have multiple cervical spine fractures on admission imaging   He is being seen in consultation by Geriatrics for high risk developing delirium during hospitalization  He is seen and examined at bedside where she is lying resting comfortably, he explains that he is admitted following a fall at home, he got caught up on the rug in the kitchen and fell hitting his head on the stove  He tells me that he broke the "tiny bones of my neck", denies pain at time of evaluation  He denies loss of consciousness at the time of fall or other acute complaints  Overnight he was confused and restless repeatedly attempting to get out of bed without assistance and is in soft waistbelt posey at time of evaluation  Prior to admission he was residing at home with his wife Regan Davey) who assists with HPI via phone  She explains that he for the past few months has been having confusion and "spells" for which he was evaluated as an o/p and underwent extensive testing including MRI brain and EEG for what she describes as "TIA spells" in which he becomes acutely confused and has difficulty communicating needs or remembering words  The episodes have been occurring once every few weeks and typically resolve on their own, she notes that he has short term memory loss as well worsening for the past year or so  He ambulates with walker at baseline and has had many falls previously treated with PT/OT at which time she reports he notes improvement however on return home from his therapy he declines to continue the exercises provided and he again declines in regard to ambulation, she states that therapy told him he would not get further benefit and not to return  She describes him as a very personable gentleman who enjoyed his therapy sessions as it gave him time to socialize  He manages his own medications at home with her supervision, he wears glasses and hearing aids, does not use dentures  He has short term memory deficits with worsening confusion in evenings but no hx behavorial disturbance      Inpatient consult to Gerontology  Consult performed by: Bobby Skelton DO  Consult ordered by: Hayes Cherry MD        Review of Systems   Constitutional: Negative for chills and fever  HENT: Negative  Eyes: Negative  Respiratory: Negative  Cardiovascular: Negative  Gastrointestinal: Negative for abdominal pain  Pt reports "bowels very active" with no pain     Endocrine: Negative  Genitourinary: Negative  Musculoskeletal: Negative  Skin: Negative  Allergic/Immunologic: Negative  Neurological: Negative  Hematological: Negative  Psychiatric/Behavioral: Negative  All other systems reviewed and are negative  Historical Information   Past Medical History:   Diagnosis Date    HLD (hyperlipidemia)     HTN (hypertension)     Prostate CA ([address] )     Skin cancer      Past Surgical History:   Procedure Laterality Date    CYSTOSCOPY       Social History   Social History     Substance and Sexual Activity   Alcohol Use Not Currently     Social History     Substance and Sexual Activity   Drug Use Never     Social History     Tobacco Use   Smoking Status Never Smoker   Smokeless Tobacco Never Used     Family History:  Patient unable to state due to acute metabolic encephalopathy    Meds/Allergies   all current active meds have been reviewed    No Known Allergies    Objective     Intake/Output Summary (Last 24 hours) at 8/9/2021 0807  Last data filed at 8/8/2021 1136  Gross per 24 hour   Intake --   Output 100 ml   Net -100 ml     Invasive Devices     Peripheral Intravenous Line            Peripheral IV 08/07/21 Left Antecubital 1 day    Peripheral IV 08/07/21 Left Wrist 1 day              Physical Exam  Vitals and nursing note reviewed  Constitutional:       Comments: Appears younger than stated age acute, no acute distress   HENT:      Head: Normocephalic  Nose: Nose normal       Mouth/Throat:      Mouth: Mucous membranes are dry  Comments: Dentition intact  Eyes:      General: No scleral icterus          Right eye: Discharge present  Left eye: Discharge present  Conjunctiva/sclera: Conjunctivae normal    Neck:      Comments: Phonation normal  Soft C-collar in place  Cardiovascular:      Rate and Rhythm: Normal rate  Pulses: Normal pulses  Heart sounds: Murmur heard  Pulmonary:      Effort: Pulmonary effort is normal  No respiratory distress  Breath sounds: No wheezing  Abdominal:      General: Bowel sounds are normal  There is no distension  Palpations: Abdomen is soft  Tenderness: There is no abdominal tenderness  Comments: Soft waist belt Posey in place   Musculoskeletal:      Cervical back: Neck supple  Right lower leg: No edema  Left lower leg: No edema  Skin:     General: Skin is warm and dry     Neurological:      Comments: Awake and oriented to self   Psychiatric:      Comments: Pleasant and cooperative, very forgetful, impulsive       Lab Results:   I have personally reviewed pertinent lab results including the following:  -sodium 140, potassium 0 6, chloride 109, CO2 25, BUN 17 creatinine 1 2, glucose 1 refresh, calcium 9, GFR 57  -hemoglobin 11 4, hematocrit 32 6, WBC 7 2, platelets 074  -INR 1 56    I have personally reviewed the following imaging study reports in PACS:    CT head without contrast 8/7/21:  Cerebral atrophy with chronic small-vessel ischemic white matter changes with no acute intracranial abnormality  CT C-spine without contrast 8/7/21:  Minimally displaced fractures involving spinous process C4 through C7 vertebral bodies, mild irregularity of anterior inferior endplate of C7 suspicious for mild compression fracture, no bony retropulsion, severe degenerative changes cervical spine most pronounced C5-6 as well atlantoaxial articulation  XR cervical spine 8/7/21:  Acute fracture spinous processes C4 through 7 with muscle spasm and degenerative changes    Therapies:   PT:  Pending  OT:  Pending    VTE Prophylaxis: Heparin    Code Status: Level 3 - DNAR and DNI  Advance Directive and Living Will:      Power of :    POLST:      Family and Social Support:   Living Arrangements: Lives w/ Spouse/significant other  Support Systems: Spouse/significant other  Assistance Needed: n/a  Type of Current Residence: Private residence  Current [address] : No    Goals of Care: Get up and ready for day

## 2021-08-09 NOTE — PROGRESS NOTES
[facility]  Progress Note Ernestina Homans 4/12/1929, 80 y o  male MRN: [id number]  Unit/Bed#: [facility] 829-01 Encounter: [id number]  Primary Care Provider: No primary care provider on file  Date and time admitted to hospital: 8/7/2021  4:23 PM    Fracture of fifth cervical vertebra [facility])  Assessment & Plan  See plan as above    Compression fracture of C7 vertebra (HCC)  Assessment & Plan  S/p mechanical fall with head strike and no loss of consciousness  Follow-up Neurosurgery consultation  C-spine precaution  Follow-up upright C-spine x-rays: Acute fractures of the spinous processes from C4 through C7  Muscle spasm  Degenerative changes  Monitor neurovascular status    Fracture of fourth cervical vertebra (HCC)  Assessment & Plan  S/p mechanical fall with head strike and no loss of consciousness  Follow-up Neurosurgery consultation  C-spine precaution  Follow-up upright C-spine x-rays: Acute fractures of the spinous processes from C4 through C7  Muscle spasm  Degenerative changes  Monitor neurovascular status    * Fall  Assessment & Plan  S/p mechanical fall with head strike and no loss of consciousness  Sustained C4-C7 fractures  Follow-up PT and OT evaluation  [place] collar in place         Disposition: continue inpt care      SUBJECTIVE:  Chief Complaint: Fall with head strike    Subjective:  Patient doing well overnight  Continues to be a GCS 14 with a nonfocal neuro exam   Patient transition to a Aspen collar  Awaiting neuro surgical evaluation  Upper rates were performed and show muscle spasm  This afternoon patient is up and out of bed, sitting in chair bedside  No other complaints      OBJECTIVE:     Meds/Allergies     Current Facility-Administered Medications:     acetaminophen (TYLENOL) tablet 975 mg, 975 mg, Oral, Q8H [facility], Horace Brown MD, 975 mg at 08/08/21 2124    amLODIPine (NORVASC) tablet 10 mg, 10 mg, Oral, Daily, Horace Brown MD, 10 mg at 08/09/21 6509    atorvastatin (LIPITOR) tablet 20 mg, 20 mg, Oral, Daily, Arun Nicolas MD, 20 mg at 08/09/21 0855    docusate sodium (COLACE) capsule 100 mg, 100 mg, Oral, BID, Arun Nicolas MD, 100 mg at 08/09/21 0855    heparin (porcine) subcutaneous injection 5,000 Units, 5,000 Units, Subcutaneous, Q8H [address], 5,000 Units at 08/09/21 0543 **AND** [COMPLETED] Platelet count, , , Once, Arun Nicolas MD    hydrALAZINE (APRESOLINE) injection 10 mg, 10 mg, Intravenous, Q6H PRN, Arun Nicolas MD, 10 mg at 08/08/21 1856    HYDROmorphone HCl (DILAUDID) injection 0 2 mg, 0 2 mg, Intravenous, Q4H PRN, Arun Nicolas MD    insulin lispro (HumaLOG) 100 units/mL subcutaneous injection 1-6 Units, 1-6 Units, Subcutaneous, HS, Arun Nicolas MD, 1 Units at 08/08/21 2127    insulin lispro (HumaLOG) 100 units/mL subcutaneous injection 1-6 Units, 1-6 Units, Subcutaneous, TID With Meals, 1 Units at 08/09/21 1159 **AND** [CANCELED] Fingerstick Glucose (POCT), , , 4 times day, Lora Soria, RT    metoprolol tartrate (LOPRESSOR) tablet 50 mg, 50 mg, Oral, Q12H [address], Arun Nicolas MD, 50 mg at 08/09/21 0855    naloxone (NARCAN) 0 04 mg/mL syringe 0 04 mg, 0 04 mg, Intravenous, Q1MIN PRN, Arun Nicolas MD    ondansetron (ZOFRAN) injection 4 mg, 4 mg, Intravenous, Q6H PRN, Arnu Nicolas MD    oxyCODONE (ROXICODONE) IR tablet 2 5 mg, 2 5 mg, Oral, Q4H PRN, Arun Nicolas MD    oxyCODONE (ROXICODONE) IR tablet 5 mg, 5 mg, Oral, Q4H PRN, Arun Nicolas MD    polyethylene glycol (MIRALAX) packet 17 g, 17 g, Oral, Daily, Arun Nicolas MD, 17 g at 08/09/21 0855    senna (SENOKOT) tablet 17 2 mg, 2 tablet, Oral, Daily, Arun Nicolas MD, 17 2 mg at 08/09/21 0855    tamsulosin (FLOMAX) capsule 0 4 mg, 0 4 mg, Oral, Daily With Jacquelene Schwab, MD, 0 4 mg at 08/08/21 1653     Vitals:   Vitals:    08/09/21 0738   BP: 170/84   Pulse: 83   Resp: 18   Temp: 98 2 °F (36 8 °C)   SpO2: 94%       Intake/Output:  I/O       08/07 0701 - 08/08 0700 08/08 0701 - 08/09 0700 08/09 0701 - 08/10 0700    P  O   120     Total Intake  120     Urine 100 100     Total Output 100 100     Net -100 +20            Unmeasured Urine Occurrence 4 x 2 x            Nutrition/GI Proph/Bowel Reg:  Carb controlled  MiraLax and Senokot  Physical Exam:   GENERAL APPEARANCE:  Well-appearing for stated age  NEURO:  Awake and alert  Nonfocal neuro exam   Symmetric strength in uppers and lowers  Normal sensation upper and lowers  HEENT:  Hematoma to the left forehead  Aspen collar in place  Pupils are equally reactive  CV:  Regular rate and rhythm  LUNGS:  CTAB  GI:  Soft, nontender  :  Deferred  MSK:  No deformities  SKIN:  Warm and dry    Invasive Devices     Peripheral Intravenous Line            Peripheral IV 08/07/21 Left Antecubital 2 days    Peripheral IV 08/07/21 Left Wrist 1 day                 Lab Results: Results: I have personally reviewed pertinent reports  Imaging/EKG Studies: Results: I have personally reviewed pertinent reports  VTE Prophylaxis: Sequential compression device (Venodyne)  heparin subQ            Progress Note - Tertiary Trauma [place]   Merline Hammond 80 y o  male MRN: [id number]  Unit/Bed#: [facility] 829-01 Encounter: [id number]    Summary of Diagnosed Injuries:   -Minimally displaced spinous process frx's of the C4-C7 vertebral bodies   -left forehead hematoma    Clinical Plan:   -neurosurgical evaluation  -Aspen collar  -PT/OT      Prophylaxis: Heparin    Disposition: continue inpt care     Code status:  Level 3 - DNAR and DNI    Consultants: Neurosurgery     Is the patient 65 years or older?: YES:    1  Before the illness or injury that brought you to the Emergency, did you need someone to help you on a regular basis? 1=Yes   2  Since the illness or injury that brought you to the Emergency, have you needed more help than usual to take care of yourself? 0=No   3   Have you been hospitalized for one or more nights during the past 6 months (excluding a stay in the Emergency Department)? 0=No   4  In general, do you see well? 0=Yes   5  In general, do you have serious problems with your memory? 0=No   6  Do you take more than three different medications everyday? 1=Yes   TOTAL   2     Did you order a geriatric consult if the score was 2 or greater?: yes        SUBJECTIVE:     Transfer from:  [facility]   Outside Films Received: not applicable  Tertiary Exam Due on: 8/9     Mechanism of Injury:Fall    Details related to Injury: +LOC:  unknown    Chief Complaint: Fall on ASA     HPI/Last 24 hour events: "80 y o  male with past medical history of hypertension, hyperlipidemia, prostate cancer status post radiation, and skin cancer on ASA who presents who presents as a transfer from [facility] after a fall with head strike  No LOC  Patient was walking without a walker in the kitchen yesterday night and tripped over the rug and fell striking his head on the oven  Patient's wife was able to help patient to his feet  He presented the following day to [facility] ED due to neck pain and thoracic spine pain  Patient was found to have C4-C7 spinous process fractures on CT  Patient's CT the chest was negative for traumatic injury  GCS 14 ([id number])  "    Patient admitted on 08/07  Continues to do well, working with PT and OT  Upright x-rays ordered per neuro surgery, awaiting formal recommendations  Aspen collar in place      Active medications:           Current Facility-Administered Medications:     acetaminophen (TYLENOL) tablet 975 mg, 975 mg, Oral, Q8H [address], 975 mg at 08/08/21 2124    amLODIPine (NORVASC) tablet 10 mg, 10 mg, Oral, Daily, 10 mg at 08/09/21 0855    atorvastatin (LIPITOR) tablet 20 mg, 20 mg, Oral, Daily, 20 mg at 08/09/21 0855    docusate sodium (COLACE) capsule 100 mg, 100 mg, Oral, BID, 100 mg at 08/09/21 0855    heparin (porcine) subcutaneous injection 5,000 Units, 5,000 Units, Subcutaneous, Q8H [address], 5,000 Units at 08/09/21 36 **AND** [COMPLETED] Platelet count, , , Once    hydrALAZINE (APRESOLINE) injection 10 mg, 10 mg, Intravenous, Q6H PRN, 10 mg at 08/08/21 1856    HYDROmorphone HCl (DILAUDID) injection 0 2 mg, 0 2 mg, Intravenous, Q4H PRN    insulin lispro (HumaLOG) 100 units/mL subcutaneous injection 1-6 Units, 1-6 Units, Subcutaneous, HS, 1 Units at 08/08/21 2127    insulin lispro (HumaLOG) 100 units/mL subcutaneous injection 1-6 Units, 1-6 Units, Subcutaneous, TID With Meals, 1 Units at 08/09/21 1159 **AND** [CANCELED] Fingerstick Glucose (POCT), , , 4 times day    metoprolol tartrate (LOPRESSOR) tablet 50 mg, 50 mg, Oral, Q12H [facility], 50 mg at 08/09/21 0855    naloxone (NARCAN) 0 04 mg/mL syringe 0 04 mg, 0 04 mg, Intravenous, Q1MIN PRN    ondansetron (ZOFRAN) injection 4 mg, 4 mg, Intravenous, Q6H PRN    oxyCODONE (ROXICODONE) IR tablet 2 5 mg, 2 5 mg, Oral, Q4H PRN    oxyCODONE (ROXICODONE) IR tablet 5 mg, 5 mg, Oral, Q4H PRN    polyethylene glycol (MIRALAX) packet 17 g, 17 g, Oral, Daily, 17 g at 08/09/21 0855    senna (SENOKOT) tablet 17 2 mg, 2 tablet, Oral, Daily, 17 2 mg at 08/09/21 0855    tamsulosin (FLOMAX) capsule 0 4 mg, 0 4 mg, Oral, Daily With Dinner, 0 4 mg at 08/08/21 1653      OBJECTIVE:     Vitals:   Vitals:    08/09/21 0738   BP: 170/84   Pulse: 83   Resp: 18   Temp: 98 2 °F (36 8 °C)   SpO2: 94%       Physical Exam:   GENERAL APPEARANCE:  Well-appearing for stated age  NEURO:  Awake and alert  Nonfocal neuro exam   Symmetric strength in uppers and lowers  Normal sensation upper and lowers  HEENT:  Hematoma to the left forehead  Aspen collar in place  Pupils are equally reactive  CV:  Regular rate and rhythm  LUNGS:  CTAB  GI:  Soft, nontender  :  Deferred  MSK:  No deformities  SKIN:  Warm and dry      I/O:   I/O       08/07 0701 - 08/08 0700 08/08 0701 - 08/09 0700 08/09 0701 - 08/10 0700    P  O   120     Total Intake  120     Urine 100 100 100    Total Output 100 100 100    Net -100 +20 -100           Unmeasured Urine Occurrence 4 x 2 x 1 x          Invasive Devices: Invasive Devices     Peripheral Intravenous Line            Peripheral IV 08/07/21 Left Antecubital 2 days    Peripheral IV 08/07/21 Left Wrist 1 day                  Imaging:   XR spine cervical 2 or 3 vw injury    Result Date: 8/8/2021  Impression: Acute fractures of the spinous processes from C4 through C7  Muscle spasm  Degenerative changes  Workstation performed: [id number]     CT head without contrast    Result Date: 8/7/2021  Impression: 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  No acute intracranial abnormality  Workstation performed: [id number]     CT chest without contrast    Result Date: 8/7/2021  Impression: 1  No traumatic thoracic injury  2   Numerous cystic lesions in the pancreas  Correlate for cystic pancreatic neoplasm  For simple cyst(s) 2 cm or greater recommend gastroenterology and/or surgical oncology consult  EUS is likely now warranted  Preferred imaging modality: abdomen MRI  and MRCP with and without IV contrast, or triple phase abdomen CT with IV contrast, or abdomen MRI and MRCP without IV contrast  The recommendations regarding pancreatic findings assumes that patient does not have family history of pancreatic cancer nor have any symptoms potentially attributable to pancreatic cystic lesions (hyperamylasemia, recent-onset diabetes, severe epigastric pain, weight loss, steatorrhea, or jaundice ) If these conditions are not true, then management should be deferred to judgement of specialists such as gastroenterologists or oncologic surgeons  Recommendations are based on recent consensus statements on management of pancreatic cystic lesions from [facility], [facility], the journal Pancreatology, and our own institutional consensus   3   Multiple noncalcified pulmonary nodules bilaterally, largest measuring 10 mm right lower lobe   Differential would include primary versus metastatic neoplasm  Based on current Fleischner Society 2017 Guidelines on incidental pulmonary nodule, either  PET/CT scan evaluation, tissue sampling or short term interval followup non-contrast CT followup (initially in 3 months) may be considered appropriate  The study was marked in [facility] for immediate notification  Workstation performed: [id number]     CT cervical spine without contrast    Result Date: 8/7/2021  Impression: 1  Minimally displaced fractures involving the spinous processes of the C4 through C7 vertebral bodies  2   Mild irregularity of the anterior, inferior endplate of C7, suspicious for mild compression fracture  No bony retropulsion  3   Severe degenerative changes of the cervical spine, most pronounced C5-C6 as well as the atlantoaxial articulation  Given the history of trauma, consider follow-up MRI of the cervical spine to exclude cord contusion, secondary to hyperflexion injury  Recommend follow-up neurosurgical or orthopedic spine surgery consultation  Consider follow-up MRI of the cervical spine to exclude cord contusion  I personally discussed this study with Deven Mendieta on 8/7/2021 at 11:28 AM  Workstation performed: [id number]     CT recon only thoracic spine    Result Date: 8/7/2021  Impression: Degenerative changes of the thoracic spine  No acute fracture or traumatic subluxation   Workstation performed: [id number]       Labs: CBC: No results found for: WBC, HGB, HCT, MCV, PLT, ADJUSTEDWBC, MCH, MCHC, RDW, MPV, NRBC  CMP: No results found for: NA, CL, CO2, ANIONGAP, BUN, CREATININE, GLUCOSE, CALCIUM, AST, ALT, ALKPHOS, PROT, BILITOT, EGFR

## 2021-08-09 NOTE — RESTORATIVE TECHNICIAN NOTE
Restorative Technician Note      Patient Name: Ti Hassan     Restorative Tech Visit Date: 08/09/21  Note Type: Bracing, Initial consult  Patient Position Upon Consult: Supine  Brace Applied: [address] Set (Adjusted to a level 2)  Patient Position When Brace Applied: Supine  Education Provided: Yes  Patient Position at End of Consult: Bedside chair; All needs within reach;Bed/Chair alarm activated  Nurse Communication: Nurse aware of consult, application of brace      Please call [employer] at extension [phone] with any questions or adjustments      JAYCE Florentino

## 2021-08-09 NOTE — ASSESSMENT & PLAN NOTE
S/p mechanical fall with head strike and no loss of consciousness  Follow-up Neurosurgery consultation  C-spine precaution  Follow-up upright C-spine x-rays: Acute fractures of the spinous processes from C4 through C7  Muscle spasm  Degenerative changes    Monitor neurovascular status

## 2021-08-09 NOTE — PLAN OF CARE
Problem: MOBILITY - ADULT  Goal: Maintain or return to baseline ADL function  Description: INTERVENTIONS:  -  Assess patient's ability to carry out ADLs; assess patient's baseline for ADL function and identify physical deficits which impact ability to perform ADLs (bathing, care of mouth/teeth, toileting, grooming, dressing, etc )  - Assess/evaluate cause of self-care deficits   - Assess range of motion  - Assess patient's mobility; develop plan if impaired  - Assess patient's need for assistive devices and provide as appropriate  - Encourage maximum independence but intervene and supervise when necessary  - Involve family in performance of ADLs  - Assess for home care needs following discharge   - Consider OT consult to assist with ADL evaluation and planning for discharge  - Provide patient education as appropriate  Outcome: Not Progressing  Goal: Maintains/Returns to pre admission functional level  Description: INTERVENTIONS:  - Perform BMAT or MOVE assessment daily    - Set and communicate daily mobility goal to care team and patient/family/caregiver  - Collaborate with rehabilitation services on mobility goals if consulted  - Perform Range of Motion 3 times a day  - Reposition patient every 2 hours    - Dangle patient 3 times a day  - Stand patient 3 times a day  - Ambulate patient 3 times a day  - Out of bed to chair 3 times a day   - Out of bed for meals 3 times a day  - Out of bed for toileting  - Record patient progress and toleration of activity level   Outcome: Not Progressing     Problem: Potential for Falls  Goal: Patient will remain free of falls  Description: INTERVENTIONS:  - Educate patient/family on patient safety including physical limitations  - Instruct patient to call for assistance with activity   - Consult OT/PT to assist with strengthening/mobility   - Keep Call bell within reach  - Keep bed low and locked with side rails adjusted as appropriate  - Keep care items and personal belongings within reach  - Initiate and maintain comfort rounds  - Make Fall Risk Sign visible to staff  - Offer Toileting every 2 Hours, in advance of need  - Initiate/Maintain alarm  - Obtain necessary fall risk management equipment:   Apply yellow socks and bracelet for high fall risk patients  - Consider moving patient to room near nurses station  Outcome: Not Progressing     Problem: Prexisting or High Potential for Compromised Skin Integrity  Goal: Skin integrity is maintained or improved  Description: INTERVENTIONS:  - Identify patients at risk for skin breakdown  - Assess and monitor skin integrity  - Assess and monitor nutrition and hydration status  - Monitor labs   - Assess for incontinence   - Turn and reposition patient  - Assist with mobility/ambulation  - Relieve pressure over bony prominences  - Avoid friction and shearing  - Provide appropriate hygiene as needed including keeping skin clean and dry  - Evaluate need for skin moisturizer/barrier cream  - Collaborate with interdisciplinary team   - Patient/family teaching  - Consider wound care consult   Outcome: Not Progressing     Problem: Nutrition/Hydration-ADULT  Goal: Nutrient/Hydration intake appropriate for improving, restoring or maintaining nutritional needs  Description: Monitor and assess patient's nutrition/hydration status for malnutrition  Collaborate with interdisciplinary team and initiate plan and interventions as ordered  Monitor patient's weight and dietary intake as ordered or per policy  Utilize nutrition screening tool and intervene as necessary  Determine patient's food preferences and provide high-protein, high-caloric foods as appropriate       INTERVENTIONS:  - Monitor oral intake, urinary output, labs, and treatment plans  - Assess nutrition and hydration status and recommend course of action  - Evaluate amount of meals eaten  - Assist patient with eating if necessary   - Allow adequate time for meals  - Recommend/ encourage appropriate diets, oral nutritional supplements, and vitamin/mineral supplements  - Order, calculate, and assess calorie counts as needed  - Recommend, monitor, and adjust tube feedings and TPN/PPN based on assessed needs  - Assess need for intravenous fluids  - Provide specific nutrition/hydration education as appropriate  - Include patient/family/caregiver in decisions related to nutrition  Outcome: Not Progressing     Problem: SAFETY,RESTRAINT: NV/NON-SELF DESTRUCTIVE BEHAVIOR  Goal: Remains free of harm/injury (restraint for non violent/non self-detsructive behavior)  Description: INTERVENTIONS:  - Instruct patient/family regarding restraint use   - Assess and monitor physiologic and psychological status   - Provide interventions and comfort measures to meet assessed patient needs   - Identify and implement measures to help patient regain control  - Assess readiness for release of restraint   Outcome: Completed  Goal: Returns to optimal restraint-free functioning  Description: INTERVENTIONS:  - Assess the patient's behavior and symptoms that indicate continued need for restraint  - Identify and implement measures to help patient regain control  - Assess readiness for release of restraint   Outcome: Completed

## 2021-08-09 NOTE — ASSESSMENT & PLAN NOTE
S/p mechanical fall with head strike and no loss of consciousness  Sustained C4-C7 fractures  Follow-up PT and OT evaluation  [place] collar in place

## 2021-08-09 NOTE — PLAN OF CARE
Problem: OCCUPATIONAL THERAPY ADULT  Goal: Performs self-care activities at highest level of function for planned discharge setting  See evaluation for individualized goals  Description: Treatment Interventions: ADL retraining, Functional transfer training, UE strengthening/ROM, Endurance training, Patient/family training, Cognitive reorientation, Equipment evaluation/education, Compensatory technique education, Fine motor coordination activities, Energy conservation, Activityengagement          See flowsheet documentation for full assessment, interventions and recommendations  Note: Limitation: Decreased ADL status, Decreased UE strength, Decreased UE ROM, Decreased Safe judgement during ADL, Decreased cognition, Decreased endurance, Decreased self-care trans, Decreased high-level ADLs  Prognosis: Fair  Assessment: Pt is a 81 yo Male who presented to [facility] on 8/7/2021 s/p fall  Pt tripped and hit head on stove  Pt with diagnosis of C4-C7 spinous fx  Pt to wear Aspen collar at all times and with spinal precautions  Pt  has a past medical history of HLD (hyperlipidemia), HTN (hypertension), Prostate CA ([address] ), and Skin cancer  Pt greeted up in recliner chair (hand off from PT) for OT evaluation on 8/9/2021  Pt poor historian, differ home set up/PLOF to CM  Pt pleasant and cooperative during OT session  Pt unreliable historian  Pt presented with decreased STM/LTM  Pt required increased time for processing/response  Pt required VCs/TCs/encouragement to maximize participation in ADL session  Pt completes UB ADLs with mod A and LB ADLs with max A  Pt completes functional transfers at mod Ax1 and functional mobility with RW at mod Ax1  Limitations that impact functional performance include decreased ADL status, decreased UE ROM, decreased UE strength, decreased safe judgement during ADLs, decreased cognition, decreased endurance, decreased self care transfers and decreased high level ADLs   Occupational performance areas to address ADL retraining, functional transfer training, UE strengthening/ROM, endurance training, cognitive reorientation, Pt/caregiver education, equipment evaluation/education, compensatory technique education, energy conservation and activity engagement   Pt would benefit from continued skilled OT services while in hospital to maximize independence with ADLs  Will continue to follow Pt's progress  Pt would benefit from post acute rehabilitation services upon DC to maximize safety and independence with ADLs and functional tasks of choice  OT Discharge Recommendation: Post acute rehabilitation services  OT - OK to Discharge:  Yes

## 2021-08-09 NOTE — PLAN OF CARE
Problem: MOBILITY - ADULT  Goal: Maintain or return to baseline ADL function  Description: INTERVENTIONS:  -  Assess patient's ability to carry out ADLs; assess patient's baseline for ADL function and identify physical deficits which impact ability to perform ADLs (bathing, care of mouth/teeth, toileting, grooming, dressing, etc )  - Assess/evaluate cause of self-care deficits   - Assess range of motion  - Assess patient's mobility; develop plan if impaired  - Assess patient's need for assistive devices and provide as appropriate  - Encourage maximum independence but intervene and supervise when necessary  - Involve family in performance of ADLs  - Assess for home care needs following discharge   - Consider OT consult to assist with ADL evaluation and planning for discharge  - Provide patient education as appropriate  Outcome: Progressing  Goal: Maintains/Returns to pre admission functional level  Description: INTERVENTIONS:  - Perform BMAT or MOVE assessment daily    - Set and communicate daily mobility goal to care team and patient/family/caregiver  - Collaborate with rehabilitation services on mobility goals if consulted  - Perform Range of Motion  times a day  - Reposition patient every hours    - Dangle patient 3 times a day  - Stand patient 3 times a day  - Ambulate patient 3 times a day  - Out of bed to chair 1 times a day   - Out of bed for meals 1 times a day  - Out of bed for toileting  - Record patient progress and toleration of activity level   Outcome: Progressing     Problem: Potential for Falls  Goal: Patient will remain free of falls  Description: INTERVENTIONS:  - Educate patient/family on patient safety including physical limitations  - Instruct patient to call for assistance with activity   - Consult OT/PT to assist with strengthening/mobility   - Keep Call bell within reach  - Keep bed low and locked with side rails adjusted as appropriate  - Keep care items and personal belongings within reach  - Initiate and maintain comfort rounds  - Make Fall Risk Sign visible to staff  - Offer Toileting every 1 Hours, in advance of need  - Initiate/Maintain alarm  - Obtain necessary fall risk management equipment:   - Apply yellow socks and bracelet for high fall risk patients  - Consider moving patient to room near nurses station  Outcome: Progressing     Problem: Prexisting or High Potential for Compromised Skin Integrity  Goal: Skin integrity is maintained or improved  Description: INTERVENTIONS:  - Identify patients at risk for skin breakdown  - Assess and monitor skin integrity  - Assess and monitor nutrition and hydration status  - Monitor labs   - Assess for incontinence   - Turn and reposition patient  - Assist with mobility/ambulation  - Relieve pressure over bony prominences  - Avoid friction and shearing  - Provide appropriate hygiene as needed including keeping skin clean and dry  - Evaluate need for skin moisturizer/barrier cream  - Collaborate with interdisciplinary team   - Patient/family teaching  - Consider wound care consult   Outcome: Progressing     Problem: Nutrition/Hydration-ADULT  Goal: Nutrient/Hydration intake appropriate for improving, restoring or maintaining nutritional needs  Description: Monitor and assess patient's nutrition/hydration status for malnutrition  Collaborate with interdisciplinary team and initiate plan and interventions as ordered  Monitor patient's weight and dietary intake as ordered or per policy  Utilize nutrition screening tool and intervene as necessary  Determine patient's food preferences and provide high-protein, high-caloric foods as appropriate       INTERVENTIONS:  - Monitor oral intake, urinary output, labs, and treatment plans  - Assess nutrition and hydration status and recommend course of action  - Evaluate amount of meals eaten  - Assist patient with eating if necessary   - Allow adequate time for meals  - Recommend/ encourage appropriate diets, oral nutritional supplements, and vitamin/mineral supplements  - Order, calculate, and assess calorie counts as needed  - Recommend, monitor, and adjust tube feedings and TPN/PPN based on assessed needs  - Assess need for intravenous fluids  - Provide specific nutrition/hydration education as appropriate  - Include patient/family/caregiver in decisions related to nutrition  Outcome: Progressing     Problem: SAFETY,RESTRAINT: NV/NON-SELF DESTRUCTIVE BEHAVIOR  Goal: Remains free of harm/injury (restraint for non violent/non self-detsructive behavior)  Description: INTERVENTIONS:  - Instruct patient/family regarding restraint use   - Assess and monitor physiologic and psychological status   - Provide interventions and comfort measures to meet assessed patient needs   - Identify and implement measures to help patient regain control  - Assess readiness for release of restraint   Outcome: Progressing  Goal: Returns to optimal restraint-free functioning  Description: INTERVENTIONS:  - Assess the patient's behavior and symptoms that indicate continued need for restraint  - Identify and implement measures to help patient regain control  - Assess readiness for release of restraint   Outcome: Progressing

## 2021-08-10 NOTE — ASSESSMENT & PLAN NOTE
C4, 5, 6, and 6 spinous process fractures s/p fall at home  · Questionable anterior inferior endplate fracture C7, chronic appearing  · Currently GCS 15, nonfocal exam  Neck pain controlled with tylenol    Imaging:  · CT cervical spine w/o, 8/7/21: Minimally displaced fractures involving the spinous processes of the C4 through C7 vertebral bodies  Mild irregularity of the anterior, inferior endplate of C7, suspicious for mild compression fracture  No bony retropulsion  · Xray cervical spine, 8/7/21: Acute fractures of the spinous processes from C4 through C7  Muscle spasm  Degenerative changes  Plan:  · Imaging reviewed personally  · No MRI is indicated at this time as patient does not have signs/symptoms of SCI  · VISTA collar at all times, christina to shower  · Routine neuro checks  · PT/OT evaluation, ok to mobilize in collar  · Pain control per primary team/geriatric team  · Medical management per primary team  · DVT ppx: SCDs, SQH  · No neurosurgical intervention is anticipated at this time    Neurosurgery will follow from the periphery  Please call with questions or concerns

## 2021-08-10 NOTE — PLAN OF CARE
Problem: MOBILITY - ADULT  Goal: Maintain or return to baseline ADL function  Description: INTERVENTIONS:  -  Assess patient's ability to carry out ADLs; assess patient's baseline for ADL function and identify physical deficits which impact ability to perform ADLs (bathing, care of mouth/teeth, toileting, grooming, dressing, etc )  - Assess/evaluate cause of self-care deficits   - Assess range of motion  - Assess patient's mobility; develop plan if impaired  - Assess patient's need for assistive devices and provide as appropriate  - Encourage maximum independence but intervene and supervise when necessary  - Involve family in performance of ADLs  - Assess for home care needs following discharge   - Consider OT consult to assist with ADL evaluation and planning for discharge  - Provide patient education as appropriate  Outcome: Not Progressing  Goal: Maintains/Returns to pre admission functional level  Description: INTERVENTIONS:  - Perform BMAT or MOVE assessment daily    - Set and communicate daily mobility goal to care team and patient/family/caregiver  - Collaborate with rehabilitation services on mobility goals if consulted  - Perform Range of Motion 3 times a day  - Reposition patient every 2 hours    - Dangle patient 3 times a day  - Stand patient 3 times a day  - Ambulate patient 3 times a day  - Out of bed to chair 3 times a day   - Out of bed for meals 3 times a day  - Out of bed for toileting  - Record patient progress and toleration of activity level   Outcome: Not Progressing     Problem: Potential for Falls  Goal: Patient will remain free of falls  Description: INTERVENTIONS:  - Educate patient/family on patient safety including physical limitations  - Instruct patient to call for assistance with activity   - Consult OT/PT to assist with strengthening/mobility   - Keep Call bell within reach  - Keep bed low and locked with side rails adjusted as appropriate  - Keep care items and personal belongings within reach  - Initiate and maintain comfort rounds  - Make Fall Risk Sign visible to staff  - Offer Toileting every 2 Hours, in advance of need  - Initiate/Maintain alarm  - Obtain necessary fall risk management equipment:   - Apply yellow socks and bracelet for high fall risk patients  - Consider moving patient to room near nurses station  Outcome: Not Progressing     Problem: Prexisting or High Potential for Compromised Skin Integrity  Goal: Skin integrity is maintained or improved  Description: INTERVENTIONS:  - Identify patients at risk for skin breakdown  - Assess and monitor skin integrity  - Assess and monitor nutrition and hydration status  - Monitor labs   - Assess for incontinence   - Turn and reposition patient  - Assist with mobility/ambulation  - Relieve pressure over bony prominences  - Avoid friction and shearing  - Provide appropriate hygiene as needed including keeping skin clean and dry  - Evaluate need for skin moisturizer/barrier cream  - Collaborate with interdisciplinary team   - Patient/family teaching  - Consider wound care consult   Outcome: Not Progressing     Problem: Nutrition/Hydration-ADULT  Goal: Nutrient/Hydration intake appropriate for improving, restoring or maintaining nutritional needs  Description: Monitor and assess patient's nutrition/hydration status for malnutrition  Collaborate with interdisciplinary team and initiate plan and interventions as ordered  Monitor patient's weight and dietary intake as ordered or per policy  Utilize nutrition screening tool and intervene as necessary  Determine patient's food preferences and provide high-protein, high-caloric foods as appropriate       INTERVENTIONS:  - Monitor oral intake, urinary output, labs, and treatment plans  - Assess nutrition and hydration status and recommend course of action  - Evaluate amount of meals eaten  - Assist patient with eating if necessary   - Allow adequate time for meals  - Recommend/ encourage appropriate diets, oral nutritional supplements, and vitamin/mineral supplements  - Order, calculate, and assess calorie counts as needed  - Recommend, monitor, and adjust tube feedings and TPN/PPN based on assessed needs  - Assess need for intravenous fluids  - Provide specific nutrition/hydration education as appropriate  - Include patient/family/caregiver in decisions related to nutrition  Outcome: Not Progressing

## 2021-08-10 NOTE — PROGRESS NOTES
[address] [facility]  Progress Note Leticia Griffin 4/12/1929, 80 y o  male MRN: [id number]  [facility] Encounter: [id number]  Primary Care Provider: No primary care provider on file  Date and time admitted to hospital: 8/7/2021  4:23 PM    Fracture of fifth cervical vertebra [facility])  Assessment & Plan  See plan as above    Compression fracture of C7 vertebra (HCC)  Assessment & Plan  S/p mechanical fall with head strike and no loss of consciousness  Follow-up Neurosurgery consultation  C-spine precaution  Follow-up upright C-spine x-rays: Acute fractures of the spinous processes from C4 through C7  Muscle spasm  Degenerative changes  Monitor neurovascular status    Fracture of fourth cervical vertebra (HCC)  Assessment & Plan  S/p mechanical fall with head strike and no loss of consciousness  Follow-up Neurosurgery consultation  C-spine precaution  Follow-up upright C-spine x-rays: Acute fractures of the spinous processes from C4 through C7  Muscle spasm  Degenerative changes    Monitor neurovascular status - stable    HLD (hyperlipidemia)  Assessment & Plan  Continue atorvastatin    HTN (hypertension)  Assessment & Plan  Continue home meds and prn   Monitor BP goal under 753 systolics    Urge incontinence of urine  Assessment & Plan  Continue flomax    * Fall  Assessment & Plan  S/p mechanical fall with head strike and no loss of consciousness  Sustained C4-C7 fractures  Follow-up PT and OT evaluation  [place] collar in place  Lior Stewart          Disposition: home with out patient services vs rehab      SUBJECTIVE:  Chief Complaint: neck pain    Subjective: " I am feeling okay"    OBJECTIVE:     Meds/Allergies     Current Facility-Administered Medications:     acetaminophen (TYLENOL) tablet 975 mg, 975 mg, Oral, Q8H [facility], Nadia Acosta MD, 975 mg at 08/10/21 1403    amLODIPine (NORVASC) tablet 10 mg, 10 mg, Oral, Daily, Nadia Acosta MD, 10 mg at 08/10/21 0836   atorvastatin (LIPITOR) tablet 20 mg, 20 mg, Oral, Daily, Hayes Cherry MD, 20 mg at 08/10/21 0836    docusate sodium (COLACE) capsule 100 mg, 100 mg, Oral, BID, Hayes Cherry MD, 100 mg at 08/10/21 0836    heparin (porcine) subcutaneous injection 5,000 Units, 5,000 Units, Subcutaneous, Q8H [facility], 5,000 Units at 08/10/21 1403 **AND** [COMPLETED] Platelet count, , , Once, Hayes Cherry MD    hydrALAZINE (APRESOLINE) injection 10 mg, 10 mg, Intravenous, Q6H PRN, Hayes Cherry MD, 10 mg at 08/08/21 1856    HYDROmorphone HCl (DILAUDID) injection 0 2 mg, 0 2 mg, Intravenous, Q4H PRN, Hayes Cherry MD    insulin lispro (HumaLOG) 100 units/mL subcutaneous injection 1-6 Units, 1-6 Units, Subcutaneous, HS, Hayes Cherry MD, 1 Units at 08/09/21 2113    insulin lispro (HumaLOG) 100 units/mL subcutaneous injection 1-6 Units, 1-6 Units, Subcutaneous, TID With Meals, 2 Units at 08/10/21 1255 **AND** [CANCELED] Fingerstick Glucose (POCT), , , 4 times day, Charley Factor, CRNP    metoprolol tartrate (LOPRESSOR) tablet 50 mg, 50 mg, Oral, Q12H [facility], Hayes Cherry MD, 50 mg at 08/10/21 0836    naloxone (NARCAN) 0 04 mg/mL syringe 0 04 mg, 0 04 mg, Intravenous, Q1MIN PRN, Hayes Cherry MD    ondansetron (ZOFRAN) injection 4 mg, 4 mg, Intravenous, Q6H PRN, Hayes Cherry MD    oxyCODONE (ROXICODONE) IR tablet 2 5 mg, 2 5 mg, Oral, Q4H PRN, Hayes Cherry MD    oxyCODONE (ROXICODONE) IR tablet 5 mg, 5 mg, Oral, Q4H PRN, Hayes Cherry MD    polyethylene glycol (MIRALAX) packet 17 g, 17 g, Oral, Daily, Hayes Cherry MD, 17 g at 08/10/21 0836    senna (SENOKOT) tablet 17 2 mg, 2 tablet, Oral, Daily, Hayes Cherry MD, 17 2 mg at 08/10/21 0836    tamsulosin (FLOMAX) capsule 0 4 mg, 0 4 mg, Oral, Daily With Chrystal Dakin, MD, 0 4 mg at 08/09/21 1713     Vitals:   Vitals:    08/10/21 1514   BP: 167/77   Pulse: 86   Resp: 18   Temp: 97 9 °F (36 6 °C)   SpO2: 94%       Intake/Output:  I/O       08/08 0701 - 08/09 0700 08/09 9844 - 08/10 0700 08/10 0701 - 08/11 0700    P  O  120 480 420    Total Intake 120 480 420    Urine 100 200     Total Output 100 200     Net +20 +280 +420           Unmeasured Urine Occurrence 2 x 2 x 1 x           Nutrition/GI Proph/Bowel Reg: regular    Physical Exam:   GENERAL APPEARANCE: comfortable, OOB in a chair  NEURO: intact, GCS - `5  HEENT: EOm's intact  CV: RRR, no complaint of chest pain  LUNGS: CTA bilaterally, no shortness of breath  GI: tolerating a diet  : voiding  MSK: moving all four extremities, [place] in place  SKIN: warm and dry    Invasive Devices     Peripheral Intravenous Line            Peripheral IV 08/07/21 Left Antecubital 3 days    Peripheral IV 08/07/21 Left Wrist 3 days                 Lab Results: Results for Tito Hess (MRN [id number]) as of 8/10/2021 16:27   Ref   Range 8/10/2021 07:23   POC Glucose Latest Ref Range: 65 - 140 mg/dl 117     Imaging/EKG Studies: none  Other Studies: none  VTE Prophylaxis: Sequential compression device (Venodyne)  and Heparin

## 2021-08-10 NOTE — RESTORATIVE TECHNICIAN NOTE
Restorative Technician Note      Patient Name: Manuel Pump     Restorative Tech Visit Date: 08/10/21  Note Type: Mobility  Patient Position Upon Consult: Supine  Activity Performed: Transferred;Dangled  Assistive Device: Roller walker  Patient Position at End of Consult: Bedside chair; All needs within reach;Bed/Chair alarm activated

## 2021-08-10 NOTE — CONSULTS
[address]  4/12/1929, 80 y o  male MRN: [id number]  [facility] Encounter: [id number]  Primary Care Provider: No primary care provider on file  Date and time admitted to hospital: 8/7/2021  4:23 PM    Inpatient consult to Neurosurgery  Consult performed by: Cristi Young PA-C  Consult ordered by: Jeanette Read PA-C      Consult completed 8/10/21 at 2:30pm    Fracture of fourth cervical vertebra [facility])  Assessment & Plan  C4, 5, 6, and 6 spinous process fractures s/p fall at home  · Questionable anterior inferior endplate fracture C7, chronic appearing  · Currently GCS 15, nonfocal exam  Neck pain controlled with tylenol    Imaging:  · CT cervical spine w/o, 8/7/21: Minimally displaced fractures involving the spinous processes of the C4 through C7 vertebral bodies  Mild irregularity of the anterior, inferior endplate of C7, suspicious for mild compression fracture  No bony retropulsion  · Xray cervical spine, 8/7/21: Acute fractures of the spinous processes from C4 through C7  Muscle spasm  Degenerative changes  Plan:  · Imaging reviewed personally  · No MRI is indicated at this time as patient does not have signs/symptoms of SCI  · VISTA collar at all times, christina to shower  · Routine neuro checks  · PT/OT evaluation, ok to mobilize in collar  · Pain control per primary team/geriatric team  · Medical management per primary team  · DVT ppx: SCDs, SQH  · No neurosurgical intervention is anticipated at this time    Neurosurgery will follow from the periphery  Please call with questions or concerns           Fracture of fifth cervical vertebra [facility])  Assessment & Plan  See plan above    Compression fracture of C7 vertebra [facility])  Assessment & Plan  See plan above    History of Present Illness     HPI: Ayde Ya is a 80y o  year old male with PMH including HLD, HTN, prostate cancer who presented after a mechanical fall at home on [place]  He was walking in the kitchen without a walker and tripped, falling and hitting his head on the oven  He reportedly did not lose consciousness  He is complaining of neck pain which is well tolerated with just tylenol  Imaging was significant for C4-7 spinous process fractures and a questionable mild anterior inferior C7 endplate fracture  He is GCS 15 today with a nonfocal exam  He denies BUE numbness/tingling  Review of Systems   Constitutional: Negative for chills and fever  HENT: Negative for ear pain and sore throat  Eyes: Negative for pain and visual disturbance  Respiratory: Negative for cough and shortness of breath  Cardiovascular: Negative for chest pain and palpitations  Gastrointestinal: Negative for abdominal pain and vomiting  Genitourinary: Negative for dysuria and hematuria  Musculoskeletal: Negative for arthralgias and back pain  Skin: Negative for color change and rash  Neurological: Negative for seizures and syncope  All other systems reviewed and are negative  Historical Information   Past Medical History:   Diagnosis Date    HLD (hyperlipidemia)     HTN (hypertension)     Prostate CA ([address] )     Skin cancer      Past Surgical History:   Procedure Laterality Date    CYSTOSCOPY       Social History     Substance and Sexual Activity   Alcohol Use Not Currently     Social History     Substance and Sexual Activity   Drug Use Never     Social History     Tobacco Use   Smoking Status Never Smoker   Smokeless Tobacco Never Used     History reviewed  No pertinent family history      Meds/Allergies   all current active meds have been reviewed, current meds:   Current Facility-Administered Medications   Medication Dose Route Frequency    acetaminophen (TYLENOL) tablet 975 mg  975 mg Oral Q8H [facility]    amLODIPine (NORVASC) tablet 10 mg  10 mg Oral Daily    atorvastatin (LIPITOR) tablet 20 mg  20 mg Oral Daily    docusate sodium (COLACE) capsule 100 mg  100 mg Oral BID    heparin (porcine) subcutaneous injection 5,000 Units  5,000 Units Subcutaneous Q8H [facility]    hydrALAZINE (APRESOLINE) injection 10 mg  10 mg Intravenous Q6H PRN    HYDROmorphone HCl (DILAUDID) injection 0 2 mg  0 2 mg Intravenous Q4H PRN    insulin lispro (HumaLOG) 100 units/mL subcutaneous injection 1-6 Units  1-6 Units Subcutaneous HS    insulin lispro (HumaLOG) 100 units/mL subcutaneous injection 1-6 Units  1-6 Units Subcutaneous TID With Meals    metoprolol tartrate (LOPRESSOR) tablet 50 mg  50 mg Oral Q12H [facility]    naloxone (NARCAN) 0 04 mg/mL syringe 0 04 mg  0 04 mg Intravenous Q1MIN PRN    ondansetron (ZOFRAN) injection 4 mg  4 mg Intravenous Q6H PRN    oxyCODONE (ROXICODONE) IR tablet 2 5 mg  2 5 mg Oral Q4H PRN    oxyCODONE (ROXICODONE) IR tablet 5 mg  5 mg Oral Q4H PRN    polyethylene glycol (MIRALAX) packet 17 g  17 g Oral Daily    senna (SENOKOT) tablet 17 2 mg  2 tablet Oral Daily    tamsulosin (FLOMAX) capsule 0 4 mg  0 4 mg Oral Daily With Dinner    and PTA meds:   Prior to Admission Medications   Prescriptions Last Dose Informant Patient Reported? Taking? amLODIPine (NORVASC) 10 mg tablet   Yes No   Sig: Take 10 mg by mouth daily   atorvastatin (LIPITOR) 20 mg tablet   Yes No   Sig: Take 20 mg by mouth daily   irbesartan (AVAPRO) 150 mg tablet   Yes No   Sig: Take 150 mg by mouth daily   metoprolol tartrate (LOPRESSOR) 50 mg tablet   Yes No   Sig: Take 50 mg by mouth   sitaGLIPtin (JANUVIA) 25 mg tablet   Yes No   Sig: Take 25 mg by mouth daily   tamsulosin (FLOMAX) 0 4 mg   Yes No   Sig: TAKE 1 CAPSULE BY MOUTH EVERY DAY 1/2 HR  FOLLOWING THE SAME MEAL EACH DAY      Facility-Administered Medications: None     No Known Allergies    Objective   I/O       08/08 0701 - 08/09 0700 08/09 0701 - 08/10 0700 08/10 0701 - 08/11 0700    P  O  120 480 420    Total Intake 120 480 420    Urine 100 200     Total Output 100 200     Net +20 +280 +420           Unmeasured Urine Occurrence 2 x 2 x 2 x Physical Exam  Vitals and nursing note reviewed  Constitutional:       Appearance: Normal appearance  He is well-developed and normal weight  HENT:      Head: Normocephalic and atraumatic  Eyes:      Extraocular Movements: Extraocular movements intact  Pupils: Pupils are equal, round, and reactive to light  Neck:      Comments: VISTA collar  TTP posterior cervical spine  Cardiovascular:      Rate and Rhythm: Normal rate  Pulmonary:      Effort: Pulmonary effort is normal  No respiratory distress  Abdominal:      Palpations: Abdomen is soft  Musculoskeletal:         General: Normal range of motion  Skin:     General: Skin is warm and dry  Neurological:      General: No focal deficit present  Mental Status: He is alert and oriented to person, place, and time  Coordination: Finger-Nose-Finger Test normal       Deep Tendon Reflexes: Strength normal       Reflex Scores:       Bicep reflexes are 2+ on the right side and 2+ on the left side  Brachioradialis reflexes are 2+ on the right side and 2+ on the left side  Patellar reflexes are 2+ on the right side and 2+ on the left side  Psychiatric:         Mood and Affect: Mood normal          Speech: Speech normal          Behavior: Behavior normal          Thought Content: Thought content normal          Judgment: Judgment normal        Neurologic Exam     Mental Status   Oriented to person, place, and time  Follows 2 step commands  Attention: normal  Concentration: normal    Speech: speech is normal   Level of consciousness: alert  Knowledge: good  Able to repeat  Normal comprehension  Cranial Nerves   Cranial nerves II through XII intact  CN III, IV, VI   Pupils are equal, round, and reactive to light  CN VIII   Hearing: impaired (hearing loss bilaterally)    Motor Exam   Muscle bulk: normal  Overall muscle tone: normal    Strength   Strength 5/5 throughout       Sensory Exam   Light touch normal  Gait, Coordination, and Reflexes     Coordination   Finger to nose coordination: normal    Tremor   Resting tremor: absent    Reflexes   Right brachioradialis: 2+  Left brachioradialis: 2+  Right biceps: 2+  Left biceps: 2+  Right patellar: 2+  Left patellar: 2+  Right Martinez: absent  Left Martinez: absent  Right ankle clonus: absent  Left ankle clonus: absent        Vitals:Blood pressure 167/77, pulse 86, temperature 97 9 °F (36 6 °C), resp  rate 18, SpO2 94 %  ,There is no height or weight on file to calculate BMI  Lab Results:   Results from last 7 days   Lab Units 08/08/21  0640 08/07/21  1812 08/07/21  1058   WBC Thousand/uL 7 82  --  8 50   HEMOGLOBIN g/dL 11 4*  --  12 7   HEMATOCRIT % 32 6*  --  37 2   PLATELETS Thousands/uL 180 175 207   NEUTROS PCT % 71  --  74   MONOS PCT % 12  --  9     Results from last 7 days   Lab Units 08/08/21  0640 08/07/21  1058   POTASSIUM mmol/L 3 6 4 3   CHLORIDE mmol/L 109* 107   CO2 mmol/L 25 26   BUN mg/dL 17 19   CREATININE mg/dL 1 12 1 16   CALCIUM mg/dL 9 0 9 4             Results from last 7 days   Lab Units 08/07/21  1058   INR  0 97   PTT seconds 29     No results found for: TROPONINT  ABG:No results found for: PHART, IQX6VEO, PO2ART, PKX0ZOL, R6MCLQRK, BEART, SOURCE    Imaging Studies: I have personally reviewed pertinent reports  and I have personally reviewed pertinent films in PACS     XR spine cervical 2 or 3 vw injury    Result Date: 8/8/2021  Narrative: CERVICAL SPINE INDICATION:   Cervical fracture  upright C-spine  COMPARISON:  None VIEWS:  XR SPINE CERVICAL 2 OR 3 VW INJURY Images: 4 FINDINGS: There is reversal of the normal cervical lordosis  There are fractures of the spinous processes of C4-C7  Severe degenerative changes throughout the cervical spine  Narrowing and sclerosis in the facet joints as at multiple levels as well as anterior spurring at multiple levels  Disc space narrowing at the C3/C4, C4/C5, C5/C6 and C6/C7 levels   The prevertebral soft tissues are within normal limits  The lung apices are clear  Impression: Acute fractures of the spinous processes from C4 through C7  Muscle spasm  Degenerative changes  Workstation performed: [id number]     CT head without contrast    Result Date: 8/7/2021  Narrative: CT BRAIN - WITHOUT CONTRAST INDICATION:   head injury  Neck pain  pt tripped and hit head on the stove, pt on aspirin  pt c/o neck l shoulder pain Shoulder pain  80-year-old male, on aspirin, presenting today after he had a trip and fall yesterday while in his kitchen, was not using his cane or walker at that point in time  Here with wife for relays that he does usually need assistance with ambulation with these  devices  Patient fell head 1st into the microwave striking the top of his head, did not lose consciousness however then fell the ground  Was able to get up afterwards with assistance from wife  Slept in a recliner  He was complaining of chest pain last night as well as neck pain which continued this morning  No longer has chest pain  Does not know any other joint pain other than his neck and upper back pain  He was able to ambulate morning  Denies headache, changes in vision, weakness, numbness paresthesias, nausea, vomiting, chest or abdominal pain, hip or groin pain  Entire patient history was obtained directly (via copy and paste) from the ED provider notes in Epic at the time of dictation  COMPARISON:  None  TECHNIQUE:  CT examination of the brain was performed  In addition to axial images, sagittal and coronal 2D reformatted images were created and submitted for interpretation  Radiation dose length product (DLP) for this visit:  918 35 mGy-cm   This examination, like all CT scans performed in the [facility], was performed utilizing techniques to minimize radiation dose exposure, including the use of iterative  reconstruction and automated exposure control  IMAGE QUALITY:  Diagnostic  FINDINGS: PARENCHYMA: Decreased attenuation is noted in periventricular and subcortical white matter demonstrating an appearance that is statistically most likely to represent moderate microangiopathic change  Chronic lacunar infarction(s) are noted in basal ganglia  No CT signs of acute infarction  No intracranial mass, mass effect or midline shift  No acute parenchymal hemorrhage  Bilateral internal carotid artery and vertebral artery calcifications  VENTRICLES AND EXTRA-AXIAL SPACES:  Enlargement of ventricles and extra-axial CSF spaces, out of proportion to the patient's age most consistent with cerebral and cerebellar atrophy  VISUALIZED ORBITS AND PARANASAL SINUSES:  No acute abnormality involving the orbits  Bilateral lens surgery  Senile calcifications of the globes bilaterally  Mild scattered sinus mucosal thickening is noted  No fluid levels are seen  CALVARIUM AND EXTRACRANIAL SOFT TISSUES:  Normal      Impression: 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  No acute intracranial abnormality  Workstation performed: [id number]     CT chest without contrast    Result Date: 8/7/2021  Narrative: CT CHEST WITHOUT IV CONTRAST INDICATION:   upper back pain  Neck pain  pt tripped and hit head on the stove, pt on aspirin  pt c/o neck l shoulder pain Shoulder pain  29-year-old male, on aspirin, presenting today after he had a trip and fall yesterday while in his kitchen, was not using his cane or walker at that point in time  Here with wife for relays that he does usually need assistance with ambulation with these  devices  Patient fell head 1st into the microwave striking the top of his head, did not lose consciousness however then fell the ground  Was able to get up afterwards with assistance from wife  Slept in a recliner  He was complaining of chest pain last night as well as neck pain which continued this morning  No longer has chest pain    Does not know any other joint pain other than his neck and upper back pain  He was able to ambulate morning  Denies headache, changes in vision, weakness, numbness paresthesias, nausea, vomiting, chest or abdominal pain, hip or groin pain  Entire patient history was obtained directly (via copy and paste) from the ED provider notes in Epic at the time of dictation  COMPARISON:  None  TECHNIQUE: CT examination of the chest was performed without intravenous contrast   Axial, sagittal, and coronal 2D reformatted images were created from the source data and submitted for interpretation  Radiation dose length product (DLP) for this visit:  631 mGy-cm   This examination, like all CT scans performed in the [facility], was performed utilizing techniques to minimize radiation dose exposure, including the use of iterative reconstruction and automated exposure control  FINDINGS: LUNGS:  The lungs are hyperinflated  Mild emphysematous changes are present throughout the lungs bilaterally  There are multiple noncalcified pulmonary nodules bilaterally, largest measuring 10 mm in the anterior aspect of the superior segment of the right lower lobe (series 3, image 59) and 6 mm in posterior aspect of the the superior segment of the left lower lobe (series 3, image 50)  Mild peribronchial thickening, compatible with bronchitis  PLEURA:  Unremarkable  HEART/GREAT VESSELS:  The heart is enlarged  Coronary artery calcifications  No evidence of a pericardial effusion  MEDIASTINUM AND CHANTALE:  There are enlarged lymph nodes in the anterior mediastinum, right paratracheal region, subcarinal region and chantale bilaterally  Largest lymph node measures 2 2 x 1 3 cm along the right paratracheal region (series 2, image 21)  CHEST WALL AND LOWER NECK:   Unremarkable  VISUALIZED STRUCTURES IN THE UPPER ABDOMEN:  Exophytic 6 cm simple cyst upper pole right kidney (series 2, image 59)   Numerous cystic lesions within the head and body of the pancreas, largest measuring 4 2 cm in the uncinate process (series 2, image 66)  OSSEOUS STRUCTURES: Osseous structures demineralized  Spinal degenerative changes are noted  No acute fracture or destructive osseous lesion  Scattered Schmorl's nodes throughout the thoracic spine  Impression: 1  No traumatic thoracic injury  2   Numerous cystic lesions in the pancreas  Correlate for cystic pancreatic neoplasm  For simple cyst(s) 2 cm or greater recommend gastroenterology and/or surgical oncology consult  EUS is likely now warranted  Preferred imaging modality: abdomen MRI  and MRCP with and without IV contrast, or triple phase abdomen CT with IV contrast, or abdomen MRI and MRCP without IV contrast  The recommendations regarding pancreatic findings assumes that patient does not have family history of pancreatic cancer nor have any symptoms potentially attributable to pancreatic cystic lesions (hyperamylasemia, recent-onset diabetes, severe epigastric pain, weight loss, steatorrhea, or jaundice ) If these conditions are not true, then management should be deferred to judgement of specialists such as gastroenterologists or oncologic surgeons  Recommendations are based on recent consensus statements on management of pancreatic cystic lesions from [address] [place] [address] [employer]astro[employer] Association, [employer] of [employer], the journal Pancreatology, and our own institutional consensus  3   Multiple noncalcified pulmonary nodules bilaterally, largest measuring 10 mm right lower lobe  Differential would include primary versus metastatic neoplasm  Based on current Fleischner Society 2017 Guidelines on incidental pulmonary nodule, either  PET/CT scan evaluation, tissue sampling or short term interval followup non-contrast CT followup (initially in 3 months) may be considered appropriate  The study was marked in [facility] for immediate notification   Workstation performed: [id number]     CT cervical spine without contrast    Result Date: 8/7/2021  Narrative: CT CERVICAL SPINE - WITHOUT CONTRAST INDICATION:   pain after fall  Neck pain  pt tripped and hit head on the stove, pt on aspirin  pt c/o neck l shoulder pain Shoulder pain  24-year-old male, on aspirin, presenting today after he had a trip and fall yesterday while in his kitchen, was not using his cane or walker at that point in time  Here with wife for relays that he does usually need assistance with ambulation with these  devices  Patient fell head 1st into the microwave striking the top of his head, did not lose consciousness however then fell the ground  Was able to get up afterwards with assistance from wife  Slept in a recliner  He was complaining of chest pain last night as well as neck pain which continued this morning  No longer has chest pain  Does not know any other joint pain other than his neck and upper back pain  He was able to ambulate morning  Denies headache, changes in vision, weakness, numbness paresthesias, nausea, vomiting, chest or abdominal pain, hip or groin pain  Entire patient history was obtained directly (via copy and paste) from the ED provider notes in Epic at the time of dictation  COMPARISON:  None  TECHNIQUE:  CT examination of the cervical spine was performed without intravenous contrast   Contiguous axial images were obtained  Sagittal and coronal reconstructions were performed  Radiation dose length product (DLP) for this visit:  361 54 mGy-cm   This examination, like all CT scans performed in the [facility], was performed utilizing techniques to minimize radiation dose exposure, including the use of iterative  reconstruction and automated exposure control  IMAGE QUALITY:  Diagnostic  FINDINGS: ALIGNMENT:  Straightening of the cervical lordotic curvature  VERTEBRAL BODIES:  Osseous structures demineralized  There are minimally displaced fractures involving the spinous processes of the C4, C5, C6 and C7 vertebral bodies  Mild irregularity involving the anterior, inferior endplate of C7  No bony retropulsion  Degenerative endplate changes diffusely throughout the cervical spine, most pronounced C5-C6  Incomplete fusion of C5 and C6, consistent with severe degenerative change  Degenerative endplate changes M8-Q4  DEGENERATIVE CHANGES:  Severe multilevel cervical degenerative changes are noted  No critical central canal stenosis  Moderate central stenosis C4-C5 and C6-C7  Severe central stenosis C5-C6  Advanced degenerative changes at the atlantoaxial articulation with moderate pannus formation  This results in severe narrowing at the craniocervical junction  Correlate for rheumatoid arthritis  PREVERTEBRAL AND PARASPINAL SOFT TISSUES:  Unremarkable  THORACIC INLET:  Normal      Impression: 1  Minimally displaced fractures involving the spinous processes of the C4 through C7 vertebral bodies  2   Mild irregularity of the anterior, inferior endplate of C7, suspicious for mild compression fracture  No bony retropulsion  3   Severe degenerative changes of the cervical spine, most pronounced C5-C6 as well as the atlantoaxial articulation  Given the history of trauma, consider follow-up MRI of the cervical spine to exclude cord contusion, secondary to hyperflexion injury  Recommend follow-up neurosurgical or orthopedic spine surgery consultation  Consider follow-up MRI of the cervical spine to exclude cord contusion  I personally discussed this study with Rody Brumfield on 8/7/2021 at 11:28 AM  Workstation performed: [id number]     CT recon only thoracic spine    Result Date: 8/7/2021  Narrative: CT THORACIC SPINE INDICATION:   midline tenderness after fall  Neck pain  pt tripped and hit head on the stove, pt on aspirin  pt c/o neck l shoulder pain Shoulder pain  80-year-old male, on aspirin, presenting today after he had a trip and fall yesterday while in his kitchen, was not using his cane or walker at that point in time  Here with wife for relays that he does usually need assistance with ambulation with these  devices  Patient fell head 1st into the microwave striking the top of his head, did not lose consciousness however then fell the ground  Was able to get up afterwards with assistance from wife  Slept in a recliner  He was complaining of chest pain last night as well as neck pain which continued this morning  No longer has chest pain  Does not know any other joint pain other than his neck and upper back pain  He was able to ambulate morning  Denies headache, changes in vision, weakness, numbness paresthesias, nausea, vomiting, chest or abdominal pain, hip or groin pain  Entire patient history was obtained directly (via copy and paste) from the ED provider notes in Epic at the time of dictation  COMPARISON: TECHNIQUE: Axial CT examination of the thoracic spine was obtained utilizing reconstructed images from CT of the chest abdomen and pelvis performed the same day  Images were reformatted in the sagittal and coronal planes  This examination, like all CT scans performed in the [facility], was performed utilizing techniques to minimize radiation dose exposure, including the use of iterative reconstruction and automated exposure control  FINDINGS: ALIGNMENT: Normal alignment of thoracic vertebral bodies  No subluxation  VERTEBRAL BODIES: Osseous structures demineralized  No acute fracture or osseous destructive lesion  Scattered Schmorl's nodes throughout the thoracic spine and upper lumbar spine  Advanced degenerative endplate changes diffusely  Anterior osteophytic spur formation and lateral syndesmophyte formation  DEGENERATIVE CHANGES: Moderate multilevel degenerative disc disease  PREVERTEBRAL AND PARASPINAL SOFT TISSUES: Normal      Impression: Degenerative changes of the thoracic spine  No acute fracture or traumatic subluxation   Workstation performed: [id number]     EKG, Pathology, and Other Studies: I have personally reviewed pertinent reports  VTE Prophylaxis: Sequential compression device (Venodyne)  and Heparin    Code Status: Level 3 - DNAR and DNI  Advance Directive and Living Will:      Power of :    POLST:      Counseling / Coordination of Care  I spent 30 minutes with the patient

## 2021-08-10 NOTE — CASE MANAGEMENT
LOS 3 D no bundle No readmit    Pt had fall at home  CM met with patient and his wife at bedside  Pt lives in a Condo 2nd floor elevator access  PT has standard walker which he uses in the home and a rollator  Pt was minimal assist with ALDS mostly shoes nad socks and help putting on a sweater  No other DME  Pt lives with his wife Juli [place] [phone]  Juli [place] drives the patient to all appointments  No Hx of STR  Denies MH or substance issues  Has had his Covid vaccines    CM gave STR to Juli [place]  CM discussed [facility]  Juli [place] stated she wants something closer to home  CM reviewed d/c planning process including the following: identifying help at home, patient preference for d/c planning needs, Discharge Lounge, [facility] Meds to Bed program, availability of treatment team to discuss questions or concerns patient and/or family may have regarding understanding medications and recognizing signs and symptoms once discharged  CM also encouraged patient to follow up with all recommended appointments after discharge  Patient advised of importance for patient and family to participate in managing patients medical well being

## 2021-08-10 NOTE — ASSESSMENT & PLAN NOTE
S/p mechanical fall with head strike and no loss of consciousness  Follow-up Neurosurgery consultation  C-spine precaution  Follow-up upright C-spine x-rays: Acute fractures of the spinous processes from C4 through C7  Muscle spasm  Degenerative changes    Monitor neurovascular status - stable

## 2021-08-10 NOTE — ASSESSMENT & PLAN NOTE
S/p mechanical fall with head strike and no loss of consciousness  Sustained C4-C7 fractures  Follow-up PT and OT evaluation  [place] collar in place  Lior Stewart

## 2021-08-10 NOTE — PROGRESS NOTES
Progress Note - Geriatric Medicine   Michelle Mcleod 80 y o  male MRN: [id number]  Unit/Bed#: [facility] [place] 829-01 Encounter: [id number]      Assessment/Plan:      Acute metabolic encephalopathy  -evidence by confusion restlessness and impulsivity beyond baseline on initial presentation requiring bilateral soft restraints which has since been discontinued  -likely multifactorial including age, fall traumatic injury, acute pain, unfamiliar environment, hospital setting superimposed on underlying cognitive impairment which increases risk development and recurrences of encephalopathy  -improved, appears to be near or back to baseline  -remains high risk recurrence of encephalopathy due to acute episode earlier in admission, age and multitude of risk factors as outlined above  -continue to encourage engagement throughout the day  -maintain normal circadian rhythm  -continue to encourage well-balanced nutrition, consider nutritional supplement drinks between meals to supplement nutritional intake and not replace regular mealtime nutrition intake  -ensure that pain is well controlled  -monitor for fecal and urinary retention  -reorient and redirect frequently    Ambulatory dysfunction with fall  -C4-C7 spinous process fractures noted on admission imaging  -remains high risk future falls due to age, history of falls, impaired vision, impulsivity, ambulatory dysfunction now with altered mobilization due to cervical spine fractures requiring C-collar  -continue fall precautions and assist with transfers  -keep personal items and call bell close and within line of sight to reduce risk patient leaning over or twisting/turning/bending due to risk loss of balance and orthostasis as well as current recommended C-spine precautions    Fracture C4-C7 spinous processes  -noted on CT C-spine 8/7/21 redemonstrated on XR cervical spine 8/7/21 with muscle spasm and degenerative changes  -continue neuro checks per protocol  -continue acute pain control-currently on geriatric pain protocol  -cautious use of antispasmodics if initiated given increased risk of confusion and falls in mature adult population    Acute pain due to trauma  -continue multimodal analgesia as outlined in geriatric pain protocol  -recommend bowel regimen to reduce risk constipation associated with pain and opiate medication use    Urge incontinence of urine  -chronic and unchanged from baseline  -history of prostate cancer s/p radiation therapy follows with Urology as outpatient  -currently on Tamsulosin, had been discontinued in past due to concern for orthostasis and falls however wife reports had recently been restarted and currently appears to be tolerating well however low threshold to encourage tapering to off if falls increasing frequency with use  -encourage timed toileting to reduce leakage and minimize rushing to restroom as increases risk of falls     Mild cognitive impairment  -no prior formal cognitive testing on record for review however history obtained from patient's wife and discussion with patient symptoms consistent with at least mild cognitive impairment  -may benefit from comprehensive geriatric assessment outpatient to establish baseline and identify and mobilize additional community-based resources as indicated and appropriate  -encourage use of hearing aids and glasses to minimize sensory impairment contributing to further cognitive decline as well as encephalopathy and delirium  -continue to encourage patient remain physically, socially, cognitively active and engaged to maintain cognitive acuity    Impaired vision  -consider large font for any printed materials provided to patient  -wife reports glasses were left with patient on admission-encourage use of appropriate times    Impaired hearing  -encourage use of hearing aids/amplifier at all appropriate times  -use teach back method to ensure clear communication    Frailty in geriatric patient  -continue optimization nutritional intake, recommend timing of nutritional supplement drinks between meals not at mealtime so as not to replace mealtime nutrition intake and instead augment between meals  -continue optimization chronic conditions and monitor for acute metabolic derangements and correct as arise    Care coordination:  Rounded with Marquis Wright (RN)    Subjective:     Patient seen examined bedside where she sitting resting comfortably having just finished eating breakfast, he reports he slept well overnight and has no pain or acute complaints this morning  He tells me he is single he did not have more significant injury sustained in the fall as he felt like he was Superman when he tripped and flew through the air hitting the oven door/handle  He denies pain or other acute complaints at this time, nursing reports no acute events overnight  Review of Systems   Constitutional: Negative for appetite change (Appetite is good, able to eat most of breakfast), chills and fever  HENT: Negative  Eyes: Negative  Respiratory: Negative  Cardiovascular: Negative  Gastrointestinal: Negative  Endocrine: Negative  Genitourinary: Negative  Musculoskeletal: Positive for gait problem  Negative for arthralgias  Skin: Negative  Allergic/Immunologic: Negative  Neurological: Negative for dizziness, light-headedness and numbness  Hematological: Negative  Psychiatric/Behavioral: Negative for sleep disturbance  All other systems reviewed and are negative  Objective:     Vitals: Blood pressure 169/79, pulse 74, temperature 98 °F (36 7 °C), resp  rate 16, SpO2 96 %  ,There is no height or weight on file to calculate BMI  Intake/Output Summary (Last 24 hours) at 8/10/2021 0906  Last data filed at 8/10/2021 0526  Gross per 24 hour   Intake 240 ml   Output 100 ml   Net 140 ml       Current Medications: Reviewed    Physical Exam:   Physical Exam  Vitals and nursing note reviewed     Constitutional: General: He is not in acute distress  Comments: Elderly male sitting in bed resting comfortably, no acute distress   HENT:      Head: Normocephalic and atraumatic  Nose: Nose normal       Mouth/Throat:      Mouth: Mucous membranes are dry  Comments: Dentition intact  Eyes:      General: No scleral icterus  Right eye: No discharge  Left eye: Discharge present  Conjunctiva/sclera: Conjunctivae normal       Comments: Pupils equal and round   Neck:      Comments: Cervical collar in place, trachea appears midline through anterior viewing window  Cardiovascular:      Rate and Rhythm: Normal rate  Pulses: Normal pulses  Pulmonary:      Effort: Pulmonary effort is normal  No respiratory distress  Breath sounds: No wheezing  Abdominal:      General: Bowel sounds are normal       Palpations: Abdomen is soft  Tenderness: There is no abdominal tenderness  Musculoskeletal:      Right lower leg: No edema  Left lower leg: No edema  Comments: Moves all 4 extremities spontaneously     Skin:     General: Skin is warm and dry  Coloration: Skin is pale  Neurological:      Mental Status: He is alert  Comments: Awake and alert oriented to person, place, general situation, forgetful   Psychiatric:      Comments: Pleasant and cooperative        Invasive Devices     Peripheral Intravenous Line            Peripheral IV 08/07/21 Left Antecubital 2 days    Peripheral IV 08/07/21 Left Wrist 2 days              Lab, Imaging and other studies: I have personally reviewed pertinent reports

## 2021-08-10 NOTE — DISCHARGE INSTRUCTIONS
Neurosurgery discharge instructions following neck fracture:      VISTA collar at all times except for showering change to christina (pe[place]) collar   No bending, twisting or heavy lifting  No pushing or pulling over 10lbs  No strenuous activities  NO DRIVING  **Please notify MD immediately if you have increased neck or arm pain  New numbness and/or weakness in your arm  Difficulty swallowing or breathing especially while lying down  Numbness or weakness in arms or legs   Increased difficulty walking **

## 2021-08-11 NOTE — PLAN OF CARE
Problem: PHYSICAL THERAPY ADULT  Goal: Performs mobility at highest level of function for planned discharge setting  See evaluation for individualized goals  Description: Treatment/Interventions: Functional transfer training, LE strengthening/ROM, Elevations, Therapeutic exercise, Endurance training, Patient/family training, Cognitive reorientation, Equipment eval/education, Bed mobility, Gait training          See flowsheet documentation for full assessment, interventions and recommendations  Outcome: Progressing  Note: Prognosis: Fair  Problem List: Decreased strength, Decreased endurance, Impaired balance, Decreased mobility, Decreased safety awareness, Decreased cognition, Pain  Assessment: Pt able to increase standing tolerance today, required CGA while performing standing ADL's at sink  Pt with limited insight to balance impairments  Requires min-modA for sit to stand transfers depending on seat height  Pt very motivated to participate and improve  Pt will benefit from continued skilled PT intervention during course of hospital stay to improve strength, endurance and balance to improve safety with functional mobility  Recommend IP rehab upon hospital D/C  PT Discharge Recommendation: Post acute rehabilitation services     PT - OK to Discharge: Yes (to IP rehab)    See flowsheet documentation for full assessment

## 2021-08-11 NOTE — PROGRESS NOTES
Progress Note - Geriatric Medicine   Evelene Castleman 80 y o  male MRN: [id number]  Unit/Bed#: [facility] 829-01 Encounter: [id number]      Assessment/Plan:    Acute metabolic encephalopathy  -although mentation continues to wax and wane to some degree is overall markedly improved from initial consultation and appears to be near baseline  -given high risk of recurrence of encephalopathy due to episode earlier in admission and underlying cognitive strict delirium precautions and preventive measures should continue to be employed  -continue to ensure acute pain is well controlled  -monitor for fecal and urinary tension  -maintain normal circadian rhythm  -continue engagement with family and friends at bedside  -continue to encourage frequent mobilization with assistance to minimize probability of impulsive attempting to get up without assistance  -continue home Prozac regimen, continue gabapentin which has been reduced from 200 mg BID to 100 mg BID due to GFR and confusion seems to be tolerating well, could consider resumption of higher dose in future if needed and GFR allows  -continue reorientation and redirection as appropriate  -continue to encourage calm and quiet environment reduce risk overstimulation  -continue supportive cares    Ambulatory dysfunction with fall  -Cervical spine spinous process fractures noted on admission imaging  -remains high risk of falls in future, continue fall prevention techniques including good body mechanics, assist with transfers maintain environment free of fall hazards  -PT and OT following, appreciate input and recommendations-anticipate STR discharge    C4-C7 spinous process fractures  -noted on CT C-spine admission  -follow-up XR C-spine reports muscle spasm and degenerative changes  -intermittent spasm discomfort is most bothersome symptom to patient, could consider low dose antispasmodic with close monitoring and hold/discontinuation parameters as may increase risk of recurrence of confusion and encephalopathy   -continue C-spine precautions and collar at all times  -neuro checks per protocol  -Nsx following, do not anticipate surgical intervention at this time-continue medical management    Acute pain due to trauma  -continue acute multimodal geriatric pain protocol  -encourage bowel regimen to reduce risk constipation    Mild cognitive impairment  -chronic symptoms history obtained from family consistent with mild cognitive impairment at baseline  -no formal cognitive testing on record for review, and undergoing testing may not change patient's treatment plan at this time however to be considered as outpatient family/patient are interested otherwise encourage close outpatient follow-up with PCP and lifestyle modifications/interventions to optimize overall health and well-being through aging process  -patient very much enjoys socialization with friends and family as well as being physically and cognitively active which are very important steps in maintain cognitive acuity  -continue use of sensory assist devices such as hearing aids and glasses at all appropriate times-offered [employer] hearing amplifier to patient/wife for use during hospitalization seeing as pts hearing aids are at home (to prevent being lost during hospital stay) however they declined and encouraged to consider use in future to improve communication     DM-II  -blood sugars are more consistently within target range, continue sliding scale coverage as appropriate  -continue close outpatient follow-up with PCP for ongoing diabetic routine screenings and cares    Urge incontinence of urine  -continued on home Flomax, blood pressures remained stable  -continue to encourage timed toileting to reduce rushing to restroom as may contribute to increased risk of falls  -continue to monitor for orthostatic hypotension    Frailty in geriatric patient   -continue to encourage nutritional intake and optimization chronic conditions  -monitor for and address metabolic derangements as arise    Care coordination:  Rounded with patient's wife at bedside, all questions answered to patient and family satisfaction    Subjective:     Patient seen examined at bedside where he sitting resting comfortably with his wife at his side  He reports some mild neck stiffness which he attributes to cervical collar, denies acute pain or paresthesias  He reports some difficulty navigating ways to eat and drink without collar in place without spilling, reviewed some techniques including use of straws and tray table higher near line of sight so as to maintain neutral position and not straining to look down  He states that he is thankful for the attentive and professional staff caring for him on the unit since admission  He offers no additional complaints at this time, wife at bedside has been in touch with case management for discharge planning purposes and amenable to STR on discharge and will be getting choices to  for next steps of discharge planning  Review of Systems   Constitutional: Negative  Negative for appetite change (appetite is good but having difficulty navigating eating/drinking with collar in place )  HENT: Negative  Eyes: Negative  Respiratory: Negative  Cardiovascular: Negative  Endocrine: Negative  Genitourinary: Negative  Musculoskeletal: Positive for arthralgias and neck stiffness  Negative for back pain  Skin: Negative  Allergic/Immunologic: Negative  Neurological: Negative for dizziness and light-headedness  Psychiatric/Behavioral: Negative for sleep disturbance  All other systems reviewed and are negative  Objective:     Vitals: Blood pressure (!) 181/85, pulse 76, temperature 97 9 °F (36 6 °C), resp  rate 20, height 5' 6" (1 676 m), weight 79 4 kg (175 lb), SpO2 95 %  ,Body mass index is 28 25 kg/m²        Intake/Output Summary (Last 24 hours) at 8/11/2021 1230  Last data filed at 8/11/2021 0900  Gross per 24 hour   Intake 420 ml   Output --   Net 420 ml     Current Medications: Reviewed    Physical Exam:   Physical Exam  Vitals and nursing note reviewed  Constitutional:       General: He is not in acute distress  Appearance: Normal appearance  Comments: Elderly well-appearing male sitting in chair side bed resting comfortably, no acute distress   HENT:      Head: Normocephalic and atraumatic  Nose: Nose normal       Mouth/Throat:      Mouth: Mucous membranes are moist    Eyes:      General:         Right eye: No discharge  Left eye: No discharge  Conjunctiva/sclera: Conjunctivae normal    Neck:      Comments: Cervical collar in place, trachea appears midline through anterior viewing window, phonation normal  Cardiovascular:      Rate and Rhythm: Normal rate  Pulses: Normal pulses  Pulmonary:      Effort: Pulmonary effort is normal  No respiratory distress  Abdominal:      Palpations: Abdomen is soft  Tenderness: There is no abdominal tenderness  Musculoskeletal:      Right lower leg: No edema  Left lower leg: No edema  Comments: Mildly reduced overall muscle mass   Skin:     General: Skin is warm and dry  Neurological:      Mental Status: He is alert  Comments: Awake and alert, oriented to person place and situation forgetful but pleasant and cooperative   Psychiatric:      Comments: Attentive and easily engaged        Invasive Devices     Peripheral Intravenous Line            Peripheral IV 08/07/21 Left Antecubital 4 days    Peripheral IV 08/07/21 Left Wrist 3 days              Lab, Imaging and other studies: I have personally reviewed pertinent reports

## 2021-08-11 NOTE — NURSING NOTE
Pt attempting to get OOB multiple times, pt high fall risk and confused so does not comprehend when attempting to redirect him to stay in bed for safety  Trauma resident made aware and orders given for soft waist restraint  Did not need to use them since pt stopped attempting to get OOB on own  Will continue to monitor  Bed alarm on

## 2021-08-11 NOTE — SPEECH THERAPY NOTE
Speech Language/Pathology  Speech-Language Pathology Bedside Swallow Evaluation      Patient Name: Kenya Waller    [id number] Date: 8/11/2021     Problem List  Principal Problem:    Compression fracture of C7 vertebra ([facility] [address] )  Active Problems:    Urge incontinence of urine    HTN (hypertension)    HLD (hyperlipidemia)    Fall    Fracture of fourth cervical vertebra ([facility] [address] )    Fracture of fifth cervical vertebra ([facility] [address] )      Past Medical History  Past Medical History:   Diagnosis Date    HLD (hyperlipidemia)     HTN (hypertension)     Prostate CA ([facility] [address] )     Skin cancer        Past Surgical History  Past Surgical History:   Procedure Laterality Date    CYSTOSCOPY         Summary   Pt presented with functional appearing oral and pharyngeal stage swallowing skills with materials administered today  His only concerns were difficulty w/ self feeding & a shaky UE but he is able to do so slowly  Difficulty w/ cup sips, 2* collar  Able to draw from straw for thin  Risk/s for Aspiration: min to none     Recommended Diet: regular diet and thin liquids   Recommended Form of Meds: whole with liquid   Aspiration precautions and swallowing strategies: upright posture, only feed when fully alert and alternating bites and sips  Other Recommendations: Continue frequent oral care        Current Medical Status  66-year-old male, on aspirin, presenting today after he had a trip and fall yesterday while in his kitchen, was not using his cane or walker at that point in time  Here with wife for relays that he does usually need assistance with ambulation with these devices  Patient fell head 1st into the oven striking the top of his head, did not lose consciousness however then fell the ground  Was able to get up afterwards with assistance from wife  Slept in a recliner  He was complaining of chest pain last night as well as neck pain which continued this morning  No longer has chest pain    Does not know any other joint pain other than his neck and upper back pain  He was able to ambulate morning  Denies headache, changes in vision, weakness, numbness paresthesias, nausea, vomiting, chest or abdominal pain, hip or groin pain    Current Precautions:  Fall  Aspiration     Allergies:  No known food allergies    Past medical history:  Please see H&P for details    Special Studies:  XR spine-Acute fractures of the spinous processes from C4 through C7  Muscle spasm  Degenerative changes  Social/Education/Vocational Hx:  Pt lives with wife    Swallow Information   Current Risks for Dysphagia & Aspiration: collar in place  Current Symptoms/Concerns: cervical spine fx  Current Diet: regular diet and thin liquids   Baseline Diet: regular diet and thin liquids      Baseline Assessment   Behavior/Cognition: alert  Speech/Language Status: able to participate in conversation  Patient Positioning: upright in chair  Pain Status/Interventions/Response to Interventions:  No report of or nonverbal indications of pain  Swallow Mechanism Exam  Facial: symmetrical  Labial: WFL  Lingual: WFL  Velum: unable to visualize  Mandible:  decreased ROM  Dentition: adequate  Vocal quality:clear/adequate   Volitional Cough: strong/productive   Respiratory Status: on RA    Hard collar in place      Consistencies Assessed and Performance   Consistencies Administered: thin liquids, soft solids and hard solids    Oral Stage: WFL  Mastication was adequate with the materials administered today  Bolus formation and transfer were functional with no significant oral residue noted  No overt s/s reduced oral control  Pharyngeal Stage: WFL  Swallow Mechanics:  Swallowing initiation appeared prompt  Laryngeal rise was palpated and judged to be within functional limits  No coughing, throat clearing, change in vocal quality or respiratory status noted today       Esophageal Concerns: none reported    Strategies and Efficacy: told to alternate the bites w/ sips for any harder material   Straw in liquids for ease of self feeding    Summary and Recommendations (see above)    Results Reviewed with: patient and family     Treatment Recommended: no

## 2021-08-11 NOTE — ASSESSMENT & PLAN NOTE
Continue home meds and prn   Monitor BP goal under 393 systolics  Unknown home dose of furosemide  Lasix 20 mg daily initiated, monitor for adjustments

## 2021-08-11 NOTE — ASSESSMENT & PLAN NOTE
S/p mechanical fall with head strike and no loss of consciousness  Appreciate Neurosurgery consultation  C-spine precaution, collar at all times  Upright C-spine x-rays completed: Acute fractures of the spinous processes from C4 through C7  Muscle spasm  Degenerative changes    Monitor neurovascular status, no focal deficits, NVI  Cleared for DVT ppx  Follow up outpatient with Neurosurgery with repeat upright xr  PT/ OT: rehab

## 2021-08-11 NOTE — PLAN OF CARE
Problem: MOBILITY - ADULT  Goal: Maintain or return to baseline ADL function  Description: INTERVENTIONS:  -  Assess patient's ability to carry out ADLs; assess patient's baseline for ADL function and identify physical deficits which impact ability to perform ADLs (bathing, care of mouth/teeth, toileting, grooming, dressing, etc )  - Assess/evaluate cause of self-care deficits   - Assess range of motion  - Assess patient's mobility; develop plan if impaired  - Assess patient's need for assistive devices and provide as appropriate  - Encourage maximum independence but intervene and supervise when necessary  - Involve family in performance of ADLs  - Assess for home care needs following discharge   - Consider OT consult to assist with ADL evaluation and planning for discharge  - Provide patient education as appropriate  8/11/2021 0141 by Ary Martino RN  Outcome: Progressing  8/10/2021 2304 by Ary Martino RN  Outcome: Progressing  8/10/2021 1614 by Ary Martino RN  Outcome: Not Progressing  Goal: Maintains/Returns to pre admission functional level  Description: INTERVENTIONS:  - Perform BMAT or MOVE assessment daily    - Set and communicate daily mobility goal to care team and patient/family/caregiver  - Collaborate with rehabilitation services on mobility goals if consulted  - Perform Range of Motion 3 times a day  - Reposition patient every 2 hours    - Dangle patient 3 times a day  - Stand patient 3 times a day  - Ambulate patient 3 times a day  - Out of bed to chair 3 times a day   - Out of bed for meals 3 times a day  - Out of bed for toileting  - Record patient progress and toleration of activity level   8/11/2021 0141 by Ary Martino RN  Outcome: Progressing  8/10/2021 2304 by Ary Martino RN  Outcome: Progressing  8/10/2021 1614 by Ary Martino RN  Outcome: Not Progressing     Problem: Potential for Falls  Goal: Patient will remain free of falls  Description: INTERVENTIONS:  - Educate patient/family on patient safety including physical limitations  - Instruct patient to call for assistance with activity   - Consult OT/PT to assist with strengthening/mobility   - Keep Call bell within reach  - Keep bed low and locked with side rails adjusted as appropriate  - Keep care items and personal belongings within reach  - Initiate and maintain comfort rounds  - Make Fall Risk Sign visible to staff  - Offer Toileting every 2 Hours, in advance of need  - Initiate/Maintain alarm  - Obtain necessary fall risk management equipment:   - Apply yellow socks and bracelet for high fall risk patients  - Consider moving patient to room near nurses station  8/11/2021 0141 by Dagoberto Rodriguez RN  Outcome: Progressing  8/10/2021 2304 by Dagoberto Rodriguez RN  Outcome: Progressing  8/10/2021 1614 by Dagoberto Rodriguez RN  Outcome: Not Progressing     Problem: Prexisting or High Potential for Compromised Skin Integrity  Goal: Skin integrity is maintained or improved  Description: INTERVENTIONS:  - Identify patients at risk for skin breakdown  - Assess and monitor skin integrity  - Assess and monitor nutrition and hydration status  - Monitor labs   - Assess for incontinence   - Turn and reposition patient  - Assist with mobility/ambulation  - Relieve pressure over bony prominences  - Avoid friction and shearing  - Provide appropriate hygiene as needed including keeping skin clean and dry  - Evaluate need for skin moisturizer/barrier cream  - Collaborate with interdisciplinary team   - Patient/family teaching  - Consider wound care consult   8/11/2021 0141 by Dagoberto Rodriguez RN  Outcome: Progressing  8/10/2021 2304 by Dagoberto Rodriguez RN  Outcome: Progressing  8/10/2021 1614 by Dagoberto Rodriguez RN  Outcome: Not Progressing     Problem: Nutrition/Hydration-ADULT  Goal: Nutrient/Hydration intake appropriate for improving, restoring or maintaining nutritional needs  Description: Monitor and assess patient's nutrition/hydration status for malnutrition  Collaborate with interdisciplinary team and initiate plan and interventions as ordered  Monitor patient's weight and dietary intake as ordered or per policy  Utilize nutrition screening tool and intervene as necessary  Determine patient's food preferences and provide high-protein, high-caloric foods as appropriate       INTERVENTIONS:  - Monitor oral intake, urinary output, labs, and treatment plans  - Assess nutrition and hydration status and recommend course of action  - Evaluate amount of meals eaten  - Assist patient with eating if necessary   - Allow adequate time for meals  - Recommend/ encourage appropriate diets, oral nutritional supplements, and vitamin/mineral supplements  - Order, calculate, and assess calorie counts as needed  - Recommend, monitor, and adjust tube feedings and TPN/PPN based on assessed needs  - Assess need for intravenous fluids  - Provide specific nutrition/hydration education as appropriate  - Include patient/family/caregiver in decisions related to nutrition  8/11/2021 0141 by Steve Sandoval RN  Outcome: Progressing  8/10/2021 2304 by Steve Sandoval RN  Outcome: Progressing  8/10/2021 1614 by Steve Sandoval RN  Outcome: Not Progressing     Problem: SAFETY,RESTRAINT: NV/NON-SELF DESTRUCTIVE BEHAVIOR  Goal: Remains free of harm/injury (restraint for non violent/non self-detsructive behavior)  Description: INTERVENTIONS:  - Instruct patient/family regarding restraint use   - Assess and monitor physiologic and psychological status   - Provide interventions and comfort measures to meet assessed patient needs   - Identify and implement measures to help patient regain control  - Assess readiness for release of restraint   8/11/2021 0141 by Steve Sandoval RN  Outcome: Completed  8/10/2021 2304 by Steve Sandoval RN  Outcome: Progressing  Goal: Returns to optimal restraint-free functioning  Description: INTERVENTIONS:  - Assess the patient's behavior and symptoms that indicate continued need for restraint  - Identify and implement measures to help patient regain control  - Assess readiness for release of restraint   8/11/2021 0141 by Carine Sanderson RN  Outcome: Completed  8/10/2021 5194 by Carine Sanderson RN  Outcome: Progressing

## 2021-08-11 NOTE — PLAN OF CARE
Problem: MOBILITY - ADULT  Goal: Maintain or return to baseline ADL function  Description: INTERVENTIONS:  -  Assess patient's ability to carry out ADLs; assess patient's baseline for ADL function and identify physical deficits which impact ability to perform ADLs (bathing, care of mouth/teeth, toileting, grooming, dressing, etc )  - Assess/evaluate cause of self-care deficits   - Assess range of motion  - Assess patient's mobility; develop plan if impaired  - Assess patient's need for assistive devices and provide as appropriate  - Encourage maximum independence but intervene and supervise when necessary  - Involve family in performance of ADLs  - Assess for home care needs following discharge   - Consider OT consult to assist with ADL evaluation and planning for discharge  - Provide patient education as appropriate  8/10/2021 2304 by Oran Holter, RN  Outcome: Progressing  8/10/2021 1614 by Oran Holter, RN  Outcome: Not Progressing  Goal: Maintains/Returns to pre admission functional level  Description: INTERVENTIONS:  - Perform BMAT or MOVE assessment daily    - Set and communicate daily mobility goal to care team and patient/family/caregiver  - Collaborate with rehabilitation services on mobility goals if consulted  - Perform Range of Motion 3 times a day  - Reposition patient every 2 hours    - Dangle patient 3 times a day  - Stand patient 3 times a day  - Ambulate patient 3 times a day  - Out of bed to chair 3 times a day   - Out of bed for meals 3 times a day  - Out of bed for toileting  - Record patient progress and toleration of activity level   8/10/2021 2304 by Oran Holter, RN  Outcome: Progressing  8/10/2021 1614 by Oran Holter, RN  Outcome: Not Progressing     Problem: Potential for Falls  Goal: Patient will remain free of falls  Description: INTERVENTIONS:  - Educate patient/family on patient safety including physical limitations  - Instruct patient to call for assistance with activity   - Consult OT/PT to assist with strengthening/mobility   - Keep Call bell within reach  - Keep bed low and locked with side rails adjusted as appropriate  - Keep care items and personal belongings within reach  - Initiate and maintain comfort rounds  - Make Fall Risk Sign visible to staff  - Offer Toileting every 2 Hours, in advance of need  - Initiate/Maintain alarm  - Obtain necessary fall risk management equipment:   - Apply yellow socks and bracelet for high fall risk patients  - Consider moving patient to room near nurses station  8/10/2021 2304 by Mai Tobias RN  Outcome: Progressing  8/10/2021 1614 by Mai Tobias RN  Outcome: Not Progressing     Problem: Prexisting or High Potential for Compromised Skin Integrity  Goal: Skin integrity is maintained or improved  Description: INTERVENTIONS:  - Identify patients at risk for skin breakdown  - Assess and monitor skin integrity  - Assess and monitor nutrition and hydration status  - Monitor labs   - Assess for incontinence   - Turn and reposition patient  - Assist with mobility/ambulation  - Relieve pressure over bony prominences  - Avoid friction and shearing  - Provide appropriate hygiene as needed including keeping skin clean and dry  - Evaluate need for skin moisturizer/barrier cream  - Collaborate with interdisciplinary team   - Patient/family teaching  - Consider wound care consult   8/10/2021 2304 by Mai Tobias RN  Outcome: Progressing  8/10/2021 1614 by Mai Tobias RN  Outcome: Not Progressing     Problem: Nutrition/Hydration-ADULT  Goal: Nutrient/Hydration intake appropriate for improving, restoring or maintaining nutritional needs  Description: Monitor and assess patient's nutrition/hydration status for malnutrition  Collaborate with interdisciplinary team and initiate plan and interventions as ordered  Monitor patient's weight and dietary intake as ordered or per policy   Utilize nutrition screening tool and intervene as necessary  Determine patient's food preferences and provide high-protein, high-caloric foods as appropriate       INTERVENTIONS:  - Monitor oral intake, urinary output, labs, and treatment plans  - Assess nutrition and hydration status and recommend course of action  - Evaluate amount of meals eaten  - Assist patient with eating if necessary   - Allow adequate time for meals  - Recommend/ encourage appropriate diets, oral nutritional supplements, and vitamin/mineral supplements  - Order, calculate, and assess calorie counts as needed  - Recommend, monitor, and adjust tube feedings and TPN/PPN based on assessed needs  - Assess need for intravenous fluids  - Provide specific nutrition/hydration education as appropriate  - Include patient/family/caregiver in decisions related to nutrition  8/10/2021 2304 by Mai Tobias RN  Outcome: Progressing  8/10/2021 1614 by Mai Tobias RN  Outcome: Not Progressing     Problem: SAFETY,RESTRAINT: NV/NON-SELF DESTRUCTIVE BEHAVIOR  Goal: Remains free of harm/injury (restraint for non violent/non self-detsructive behavior)  Description: INTERVENTIONS:  - Instruct patient/family regarding restraint use   - Assess and monitor physiologic and psychological status   - Provide interventions and comfort measures to meet assessed patient needs   - Identify and implement measures to help patient regain control  - Assess readiness for release of restraint   Outcome: Progressing  Goal: Returns to optimal restraint-free functioning  Description: INTERVENTIONS:  - Assess the patient's behavior and symptoms that indicate continued need for restraint  - Identify and implement measures to help patient regain control  - Assess readiness for release of restraint   Outcome: Progressing

## 2021-08-11 NOTE — ASSESSMENT & PLAN NOTE
S/p mechanical fall with head strike and no loss of consciousness  Sustained C4-C7 fractures  PT/OT: rehab  CM for disposition planning

## 2021-08-11 NOTE — TELEPHONE ENCOUNTER
08/13/2021- SPOKE TO [address] 08/25/2021 APT AT [facility] OFFICE WITH XRAY NEEDED PRIOR  Luca Frankel ADVISED THAT PT WILL HAVE XRAY AT [facility]       08/11/2021-PT STILL IN HOSPITAL  08/25/2021 APT W/[facility] IN [facility] W/CERVICAL SPINE XRAY      ----- Message from Mariella Sommer PA-C sent at 8/10/2021  4:33 PM EDT -----  Regarding: hospital follow up  Please schedule a 2 week hospital follow up with cervical xray prior at [facility]  Thanks!

## 2021-08-11 NOTE — PHYSICAL THERAPY NOTE
Physical Therapy Treatment Note    Patient's Name: Gail Perry  : 21 1038   PT Last Visit   PT Visit Date 21   Note Type   Note Type Treatment   Pain Assessment   Pain Assessment Tool Pain Assessment not indicated - pt denies pain   Pain Score No Pain   Restrictions/Precautions   Weight Bearing Precautions Per Order No   Braces or Orthoses C/S Collar  [employer])   Other Precautions Cognitive; Chair Alarm; Fall Risk;Hard of hearing   General   Chart Reviewed Yes   Cognition   Overall Cognitive Status Impaired   Attention Attends with cues to redirect   Orientation Level Oriented to person;Oriented to place; Disoriented to time;Disoriented to situation   Following Commands Follows one step commands with increased time or repetition   Comments Pt pleasant and cooperative throughout session, motivated to participate   Bed Mobility   Supine to Sit 3  Moderate assistance   Additional items Assist x 1; Increased time required;Verbal cues;HOB elevated   Additional Comments VC's for sequencing of bed mobility, modA for trunk control, cues for UE use to scoot toward EOB   Transfers   Sit to Stand 4  Minimal assistance   Additional items Assist x 1; Increased time required;Verbal cues   Stand to Sit 4  Minimal assistance   Additional items Assist x 1; Increased time required;Verbal cues   Toilet transfer 3  Moderate assistance   Additional items Assist x 1; Increased time required;Verbal cues;Standard toilet   Additional Comments with RW, VC's for hand placement, appropriate positioning prior to sitting, control of descent   Ambulation/Elevation   Gait pattern Decreased foot clearance; Improper Weight shift; Forward Flexion; Short stride   Gait Assistance 4  Minimal assist   Additional items Assist x 1   Assistive Device Rolling walker   Distance 80 ft, limited by fatigue    Pt reports improved LE strength/mobility with distance stating "my legs feel better after walking "    Balance   Static Sitting Fair +   Dynamic Sitting Fair -   Static Standing Fair -   Dynamic Standing Poor +   Ambulatory Poor +   Activity Tolerance   Activity Tolerance Patient limited by fatigue   Nurse Made Aware RN updated  Chair alarm engaged at end of session   Exercises   Knee AROM Long Arc Quad Sitting;15 reps;AROM; Bilateral   Ankle Pumps Sitting;20 reps;AROM; Bilateral   Marching Sitting;15 reps;AROM; Bilateral   Assessment   Prognosis Fair   Problem List Decreased strength;Decreased endurance; Impaired balance;Decreased mobility; Decreased safety awareness;Decreased cognition;Pain   Assessment Pt able to increase standing tolerance today, required CGA while performing standing ADL's at sink  Pt with limited insight to balance impairments  Requires min-modA for sit to stand transfers depending on seat height  Pt very motivated to participate and improve  Pt will benefit from continued skilled PT intervention during course of hospital stay to improve strength, endurance and balance to improve safety with functional mobility  Recommend IP rehab upon hospital D/C  Goals   Patient Goals to walk   STG Expiration Date 08/23/21   PT Treatment Day 1   Plan   Treatment/Interventions Functional transfer training;LE strengthening/ROM; Elevations; Therapeutic exercise; Endurance training;Patient/family training;Cognitive reorientation;Equipment eval/education; Bed mobility;Gait training   Progress Progressing toward goals   PT Frequency Other (Comment)  (3-5x/week)   Recommendation   PT Discharge Recommendation Post acute rehabilitation services   PT - OK to Discharge Yes  (to IP rehab)   Uy[place] 8 in Bed Without Bedrails 3   Lying on Back to Sitting on Edge of Flat Bed 3   Moving Bed to Chair 3   Standing Up From Chair 3   Walk in Room 3   Climb 3-5 Stairs 2   Basic Mobility Inpatient Raw Score 17   Basic Mobility Standardized Score 39 67       Edwige John Levi, DPT

## 2021-08-11 NOTE — CASE MANAGEMENT
Pt recommended for IP Rehab  Cm discussed with pt's wife  She is in agreement with SNF  A post acute care recommendation was made by your care team for STR  Discussed Freedom of Choice with caregiver  List of facilities given to caregiver via in person  caregiver aware the list is custom filtered for them by preference  and that [facility] post acute providers are designated  Pt's [facility] would like [facility]  CM placed referrals to both

## 2021-08-11 NOTE — PROGRESS NOTES
[facility]  Progress Note Dinh Galicia 4/12/1929, 80 y o  male MRN: [id number]  Unit/Bed#: [facility] 829-01 Encounter: [id number]  Primary Care Provider: No primary care provider on file  Date and time admitted to hospital: 8/7/2021  4:23 PM    Fracture of fifth cervical vertebra [facility])  Assessment & Plan  See plan for C7 fracture above    Fracture of fourth cervical vertebra [facility])  Assessment & Plan  See plan for C7 fracture     Fall  Assessment & Plan  S/p mechanical fall with head strike and no loss of consciousness  Sustained C4-C7 fractures  PT/OT: rehab  CM for disposition planning    HLD (hyperlipidemia)  Assessment & Plan  Continue atorvastatin    HTN (hypertension)  Assessment & Plan  Continue home meds and prn   Monitor BP goal under 827 systolics  Unknown home dose of furosemide  Lasix 20 mg daily initiated, monitor for adjustments    Urge incontinence of urine  Assessment & Plan  Continue flomax    * Compression fracture of C7 vertebra (HCC)  Assessment & Plan  S/p mechanical fall with head strike and no loss of consciousness  Appreciate Neurosurgery consultation  C-spine precaution, collar at all times  Upright C-spine x-rays completed: Acute fractures of the spinous processes from C4 through C7  Muscle spasm  Degenerative changes  Monitor neurovascular status, no focal deficits, NVI  Cleared for DVT ppx  Follow up outpatient with Neurosurgery with repeat upright xr  PT/ OT: rehab        Disposition: Continue med surg level of care  Medically stable for discharge to rehab  SUBJECTIVE:  Chief Complaint: "I don't have much pain"     Subjective:  Patient reports that his neck does not cause him much pain, and that he has been taking Tylenol      OBJECTIVE:     Meds/Allergies     Current Facility-Administered Medications:     acetaminophen (TYLENOL) tablet 975 mg, 975 mg, Oral, Q8H [address], Grabiel Ross MD, 975 mg at 08/10/21 2104    amLODIPine (NORVASC) tablet 10 mg, 10 mg, Oral, Daily, Fredrick Krishna MD, 10 mg at 08/10/21 0836    atorvastatin (LIPITOR) tablet 20 mg, 20 mg, Oral, Daily, Fredrick Krishna MD, 20 mg at 08/10/21 0836    docusate sodium (COLACE) capsule 100 mg, 100 mg, Oral, BID, Fredrick Krishna MD, 100 mg at 08/10/21 1715    furosemide (LASIX) tablet 20 mg, 20 mg, Oral, Daily, Jose J Lima PA-C    heparin (porcine) subcutaneous injection 5,000 Units, 5,000 Units, Subcutaneous, Q8H [address], 5,000 Units at 08/11/21 3235 **AND** [COMPLETED] Platelet count, , , Once, Fredrick Krishna MD    hydrALAZINE (APRESOLINE) injection 10 mg, 10 mg, Intravenous, Q6H PRN, Fredrick Krishna MD, 10 mg at 08/08/21 1856    HYDROmorphone HCl (DILAUDID) injection 0 2 mg, 0 2 mg, Intravenous, Q4H PRN, Fredrick Krishna MD    insulin lispro (HumaLOG) 100 units/mL subcutaneous injection 1-6 Units, 1-6 Units, Subcutaneous, HS, Fredrick Krishna MD, 1 Units at 08/10/21 2128    insulin lispro (HumaLOG) 100 units/mL subcutaneous injection 1-6 Units, 1-6 Units, Subcutaneous, TID With Meals, 1 Units at 08/10/21 1715 **AND** [CANCELED] Fingerstick Glucose (POCT), , , 4 times day, TR Bhatti    losartan (COZAAR) tablet 50 mg, 50 mg, Oral, Daily, Jose J Lima PA-C    metoprolol tartrate (LOPRESSOR) tablet 50 mg, 50 mg, Oral, Q12H [address], Fredrick Krishna MD, 50 mg at 08/10/21 2105    naloxone (NARCAN) 0 04 mg/mL syringe 0 04 mg, 0 04 mg, Intravenous, Q1MIN PRN, Fredrick Krishna MD    ondansetron (ZOFRAN) injection 4 mg, 4 mg, Intravenous, Q6H PRN, Fredrick Krishna MD    oxyCODONE (ROXICODONE) IR tablet 2 5 mg, 2 5 mg, Oral, Q4H PRN, Fredrick Krishna MD    oxyCODONE (ROXICODONE) IR tablet 5 mg, 5 mg, Oral, Q4H PRN, Fredrick Krishna MD    polyethylene glycol (MIRALAX) packet 17 g, 17 g, Oral, Daily, Fredrick Krishna MD, 17 g at 08/10/21 0836    senna (SENOKOT) tablet 17 2 mg, 2 tablet, Oral, Daily, Fredrick Krishna MD, 17 2 mg at 08/10/21 0836    tamsulosin (FLOMAX) capsule 0 4 mg, 0 4 mg, Oral, Daily With Jean Carlos Caceres MD, 0 4 mg at 08/10/21 1715     Vitals:   Vitals:    08/11/21 0736   BP: (!) 181/85   Pulse: 76   Resp: 20   Temp: 97 9 °F (36 6 °C)   SpO2: 95%       Intake/Output:  I/O       08/09 0701 - 08/10 0700 08/10 0701 - 08/11 0700 08/11 0701 - 08/12 0700    P  O  480 420     Total Intake 480 420     Urine 200      Total Output 200      Net +280 +420            Unmeasured Urine Occurrence 2 x 2 x            Nutrition/GI Proph/Bowel Reg: Senna, Colace    Physical Exam:   GENERAL APPEARANCE: Patient in no acute distress  HEENT: NCAT; PERRL, EOMs intact; Mucous membranes moist  NECK / BACK: Cervical collar in place  CV: Regular rate and rhythm; no murmur/gallops/rubs appreciated  CHEST / LUNGS: Clear to auscultation; no wheezes/rales/rhonci  ABD: NABS; soft; non-distended; non-tender  : Voiding spontaneously  EXT: +2 pulses bilaterally upper & lower extremities; no edema  NEURO: GCS 14, oriented to person and time, disoriented to place; no focal neurologic deficits; neurovascularly intact  SKIN: Warm, dry and well perfused; no rash; no jaundice  Invasive Devices     Peripheral Intravenous Line            Peripheral IV 08/07/21 Left Antecubital 3 days    Peripheral IV 08/07/21 Left Wrist 3 days                 Lab Results: Results: I have personally reviewed pertinent reports  Imaging/EKG Studies: Results: I have personally reviewed pertinent reports  Other Studies:   XR spine cervical 2 or 3 vw injury   Final Result by Carlos Alberto Naik MD (08/08 1717)      Acute fractures of the spinous processes from C4 through C7  Muscle spasm  Degenerative changes           Workstation performed: [id number]         XR spine cervical 2 or 3 vw injury    (Results Pending)       VTE Prophylaxis: Sequential compression device (Venodyne)  and Heparin

## 2021-08-12 PROBLEM — K86.2 PANCREATIC CYST: Status: ACTIVE | Noted: 2021-01-01

## 2021-08-12 NOTE — INCIDENTAL FINDINGS
The following findings require follow up:  Radiographic finding      · Scattered Schmorl's nodes throughout the thoracic spine  · There are enlarged lymph nodes in the anterior mediastinum, right paratracheal region, subcarinal region and jayesh bilaterally  Largest lymph node measures 2 2 x 1 3 cm along the right paratracheal region (series 2, image 21)  · Exophytic 6 cm simple cyst upper pole right kidney (series 2, image 59)  · Numerous cystic lesions within the head and body of the pancreas, largest measuring 4 2 cm in the uncinate process (series 2, image 66)  Correlate for cystic pancreatic neoplasm  For simple cyst(s) 2 cm or greater recommend gastroenterology and/or surgical oncology consult  EUS is likely now warranted  Preferred imaging modality: abdomen MRI and MRCP with and without IV contrast, or triple phase abdomen CT with IV contrast, or abdomen MRI and MRCP without IV contrast       The recommendations regarding pancreatic findings assumes that patient does not have family history of pancreatic cancer nor have any symptoms potentially attributable to pancreatic cystic lesions (hyperamylasemia, recent-onset diabetes, severe   epigastric pain, weight loss, steatorrhea, or jaundice ) If these conditions are not true, then management should be deferred to judgement of specialists such as gastroenterologists or oncologic surgeons  Recommendations are based on recent consensus statements on management of pancreatic cystic lesions from [address] [place] [address] Gastro[employer] Association, [employer], the journal Pancreatology, and our own institutional consensus  · Multiple noncalcified pulmonary nodules bilaterally, largest measuring 10 mm right lower lobe  Differential would include primary versus metastatic neoplasm    Based on current Fleischner Society 2017 Guidelines on incidental pulmonary nodule, either PET/CT scan evaluation, tissue sampling or short term interval followup non-contrast CT followup (initially in 3 months) may be considered appropriate       Dr Nayana Carvalho and No MITCHELL PA-C, discussed incidental findings with patient and his wife, Kendrick Rivera, at bedside  Due to patient's history of memory loss, his wife was necessary for discussion and plan  Wife is fully understanding of the incidental findings and they plan to follow up with GI outpatient  All questions were answered  Follow up should be done within 1 week(s)    Please call [phone] to schedule follow up appointment with [facility]

## 2021-08-12 NOTE — ASSESSMENT & PLAN NOTE
Continue home meds and prn   Monitor BP goal under 000 systolics  Unknown home dose of furosemide  Lasix 20 mg daily initiated, monitor for adjustments

## 2021-08-12 NOTE — DISCHARGE SUMMARY
[address] [facility]  Discharge- [place] 4/12/1929, 80 y o  male MRN: [id number]  [facility] Encounter: [id number]  Primary Care Provider: No primary care provider on file  Date and time admitted to hospital: 8/7/2021  4:23 PM    Pancreatic cyst  Assessment & Plan  - Please see incidental findings note  - Discussed with GI and oncology over [facility] Text that patient should follow up outpatient with GI regarding possible tests or treatments available  Fracture of fifth cervical vertebra [facility])  Assessment & Plan  See plan for C7 fracture above    Fracture of fourth cervical vertebra [facility])  Assessment & Plan  See plan for C7 fracture     Fall  Assessment & Plan  S/p mechanical fall with head strike and no loss of consciousness  Sustained C4-C7 fractures  PT/OT: rehab  CM for disposition planning    HLD (hyperlipidemia)  Assessment & Plan  Continue atorvastatin    HTN (hypertension)  Assessment & Plan  Continue home meds and prn   Monitor BP goal under 307 systolics  Unknown home dose of furosemide  Lasix 20 mg daily initiated, monitor for adjustments    Urge incontinence of urine  Assessment & Plan  Continue flomax    * Compression fracture of C7 vertebra (HCC)  Assessment & Plan  S/p mechanical fall with head strike and no loss of consciousness  Appreciate Neurosurgery consultation  C-spine precaution, collar at all times  Upright C-spine x-rays completed: Acute fractures of the spinous processes from C4 through C7  Muscle spasm  Degenerative changes  Monitor neurovascular status, no focal deficits, NVI  Cleared for DVT ppx  Follow up outpatient with Neurosurgery with repeat upright xr  PT/ OT: rehab        Disposition: Discharge to rehab today  Follow up outpatient with Neurosurgery  SUBJECTIVE:  Chief Complaint: "I'm feeling better today"    Subjective: Patient reports that he is feeling better today, and that he doesn't have any pain   He is anticipating going to rehab  Patient denies neck pain, back pain, head aches, chest pain, shortness of breath, abdominal pain, nausea, vomiting, lack of appetite, abdominal distension, recent weight loss           OBJECTIVE:     Meds/Allergies     Current Facility-Administered Medications:     acetaminophen (TYLENOL) tablet 975 mg, 975 mg, Oral, Q8H [address], Juan Coles MD, 975 mg at 08/12/21 1326    amLODIPine (NORVASC) tablet 10 mg, 10 mg, Oral, Daily, Juan Coles MD, 10 mg at 08/12/21 0912    atorvastatin (LIPITOR) tablet 20 mg, 20 mg, Oral, Daily, Juan Coles MD, 20 mg at 08/12/21 0912    docusate sodium (COLACE) capsule 100 mg, 100 mg, Oral, BID, Juan Coles MD, 100 mg at 08/12/21 0912    furosemide (LASIX) tablet 20 mg, 20 mg, Oral, Daily, Alexandre Lima PA-C, 20 mg at 08/12/21 0912    heparin (porcine) subcutaneous injection 5,000 Units, 5,000 Units, Subcutaneous, Q8H [address], 5,000 Units at 08/12/21 1326 **AND** [COMPLETED] Platelet count, , , Once, Juan Coles MD    hydrALAZINE (APRESOLINE) injection 10 mg, 10 mg, Intravenous, Q6H PRN, Juan Coles MD, 10 mg at 08/08/21 1856    HYDROmorphone HCl (DILAUDID) injection 0 2 mg, 0 2 mg, Intravenous, Q4H PRN, Juan Coles MD    insulin lispro (HumaLOG) 100 units/mL subcutaneous injection 1-6 Units, 1-6 Units, Subcutaneous, HS, Juan Coles MD, 1 Units at 08/10/21 2128    insulin lispro (HumaLOG) 100 units/mL subcutaneous injection 1-6 Units, 1-6 Units, Subcutaneous, TID With Meals, 2 Units at 08/12/21 1132 **AND** [CANCELED] Fingerstick Glucose (POCT), , , 4 times day, TR Alva    losartan (COZAAR) tablet 50 mg, 50 mg, Oral, Daily, Alexandre Lima PA-C, 50 mg at 08/12/21 0912    metoprolol tartrate (LOPRESSOR) tablet 50 mg, 50 mg, Oral, Q12H [address], Juan Coles MD, 50 mg at 08/12/21 0912    naloxone (NARCAN) 0 04 mg/mL syringe 0 04 mg, 0 04 mg, Intravenous, Q1MIN PRN, Juan Coles MD    ondansetron (ZOFRAN) injection 4 mg, 4 mg, Intravenous, Q6H PRN, Anup Sandoval MD    oxyCODONE (ROXICODONE) IR tablet 2 5 mg, 2 5 mg, Oral, Q4H PRN, Anup Sandoval MD    oxyCODONE (ROXICODONE) IR tablet 5 mg, 5 mg, Oral, Q4H PRN, Anup Sandoval MD    polyethylene glycol (MIRALAX) packet 17 g, 17 g, Oral, Daily, Anup Sandoval MD, 17 g at 08/12/21 0909    senna (SENOKOT) tablet 17 2 mg, 2 tablet, Oral, Daily, Anup Sandoval MD, 17 2 mg at 08/12/21 0912    tamsulosin (FLOMAX) capsule 0 4 mg, 0 4 mg, Oral, Daily With Francis Bal MD, 0 4 mg at 08/11/21 1719    Current Outpatient Medications:     acetaminophen (TYLENOL) 325 mg tablet, Take 3 tablets (975 mg total) by mouth every 8 (eight) hours, Disp: , Rfl:     amLODIPine (NORVASC) 10 mg tablet, Take 10 mg by mouth daily, Disp: , Rfl:     atorvastatin (LIPITOR) 20 mg tablet, Take 20 mg by mouth daily, Disp: , Rfl:     furosemide (LASIX) 20 mg tablet, Take 20 mg by mouth daily, Disp: , Rfl:     irbesartan (AVAPRO) 150 mg tablet, Take 150 mg by mouth daily, Disp: , Rfl:     metoprolol tartrate (LOPRESSOR) 50 mg tablet, Take 50 mg by mouth, Disp: , Rfl:     [START ON 8/13/2021] senna (SENOKOT) 8 6 mg, Take 2 tablets (17 2 mg total) by mouth daily, Disp: , Rfl:     sitaGLIPtin (JANUVIA) 25 mg tablet, Take 25 mg by mouth daily, Disp: , Rfl:     tamsulosin (FLOMAX) 0 4 mg, TAKE 1 CAPSULE BY MOUTH EVERY DAY 1/2 HR  FOLLOWING THE SAME MEAL EACH DAY, Disp: , Rfl:      Vitals:   Vitals:    08/12/21 0818   BP: 157/72   Pulse: 71   Resp: 16   Temp: 97 7 °F (36 5 °C)   SpO2: 94%       Intake/Output:  I/O       08/10 0701 - 08/11 0700 08/11 0701 - 08/12 0700 08/12 0701 - 08/13 0700    P  O  420 718 240    Total Intake(mL/kg) 420 718 (9) 240 (3)    Urine (mL/kg/hr)       Total Output       Net +420 +718 +240           Unmeasured Urine Occurrence 2 x 3 x     Unmeasured Stool Occurrence   1 x           Nutrition/GI Proph/Bowel Reg: Colace, Senna    Physical Exam:   GENERAL APPEARANCE: Patient in no acute distress  HEENT: NCAT; PERRL, EOMs intact; Mucous membranes moist  NECK / BACK: Cervical collar in place  CV: Regular rate and rhythm; no murmur/gallops/rubs appreciated  CHEST / LUNGS: Clear to auscultation; no wheezes/rales/rhonci  ABD: NABS; soft; non-distended; non-tender  : Voiding spontaneously  EXT: +2 pulses bilaterally upper & lower extremities; no edema  NEURO: GCS 15; no focal neurologic deficits; neurovascularly intact  SKIN: Warm, dry and well perfused; no rash; no jaundice  Invasive Devices     None                  Lab Results: Results: I have personally reviewed pertinent reports  Imaging/EKG Studies: Results: I have personally reviewed pertinent reports  Other Studies:   XR spine cervical 2 or 3 vw injury   Final Result by Carlos Rodriges MD (08/08 1717)      Acute fractures of the spinous processes from C4 through C7  Muscle spasm  Degenerative changes  Workstation performed: [id number]         XR spine cervical 2 or 3 vw injury    (Results Pending)   MRI abdomen wo contrast and mrcp    (Results Pending)       VTE Prophylaxis:Sequential compression device (Venodyne)  and Heparin         Medical Problems     Resolved Problems  Date Reviewed: 8/12/2021    None                Admission Date:   Admission Orders (From admission, onward)     Ordered        08/07/21 1806  Inpatient Admission  Once                     Admitting Diagnosis: Injury, unspecified, initial encounter [place] Number    HPI written 8/7 by Kostas Milligan MD " Ira Conway is a 80 y o  male with past medical history of hypertension, hyperlipidemia, prostate cancer status post radiation, and skin cancer on ASA who presents who presents as a transfer from [facility] after a fall with head strike  No LOC  Patient was walking without a walker in the kitchen yesterday night and tripped over the rug and fell striking his head on the oven  Patient's wife was able to help patient to his feet   He presented the following day to [facility] ED due to neck pain and thoracic spine pain  Patient was found to have C4-C7 spinous process fractures on CT  Patient's CT the chest was negative for traumatic injury  GCS 14 ([id number])"    Procedures Performed: No orders of the defined types were placed in this encounter  Summary of Hospital Course: Please see above and reference hospital medical records  Significant Findings, Care, Treatment and Services Provided: Trauma transfer to [facility] and admission for Neurosurgical work-up  Consultations provided by Neurosurgery and Geriatric medicine  Discussion with wife on 8/12 regarding incidental findings that likely indicate pancreatic cancer with metastasis to the lungs  Follow up outpatient with Gastroenterology set up for next steps  PT and OT evaluations and discharge to rehab  Complications: None    Condition at Discharge: stable         Discharge instructions/Information to patient and family:   See after visit summary for information provided to patient and family  Provisions for Follow-Up Care:  See after visit summary for information related to follow-up care and any pertinent home health orders  PCP: No primary care provider on file  Disposition: Short-term rehab at [facility] Readmission: No    Discharge Statement   I spent 30 minutes discharging the patient  This time was spent on the day of discharge  I had direct contact with the patient on the day of discharge  Additional documentation is required if more than 30 minutes were spent on discharge  Discharge Medications:  See after visit summary for reconciled discharge medications provided to patient and family

## 2021-08-12 NOTE — ASSESSMENT & PLAN NOTE
- Please see incidental findings note  - Discussed with GI and oncology over [facility] Text that patient should follow up outpatient with GI regarding possible tests or treatments available

## 2021-08-12 NOTE — CASE MANAGEMENT
Pt cleared for d/c to rehab  Pt was accepted to [facility] (first choice)  [facility]

## 2021-08-12 NOTE — TELEPHONE ENCOUNTER
----- Message from Lucille To MA sent at 8/12/2021  2:12 PM EDT -----    ----- Message -----  From: Fredrick Coppola MD  Sent: 8/12/2021   1:55 PM EDT  To: , #    Please schedule outpatient follow-up in clinic with myself within 1-2 months to discuss pancreatic cyst   Patient is in hospital but was never consulted or seen by our Gastroenterology team   Thank you

## 2021-08-25 PROBLEM — S12.501D: Status: ACTIVE | Noted: 2021-01-01

## 2021-08-25 NOTE — PATIENT INSTRUCTIONS
Continue with cervical collar at all times 24 hours a day, 7 days a week  Change pads on cervical collar every 1-2 days, wash in mild soapy water, allow to air dry  Use Yamini collar (given today) for shower time  Continue restricted activities, specifically avoid lifting, pushing, pulling greater than 10 lb  Ambulate as tolerated with assistance  Avoid bending  Further follow-up with Neurosurgery in approximately 4 weeks        Plain x-rays cervical spine 3 - 4 days prior to follow-up visit

## 2021-08-25 NOTE — PROGRESS NOTES
Patient ID: Lisa Landis is a 80 y o  male  Diagnoses and all orders for this visit:    Closed nondisplaced fracture of fourth cervical vertebra with routine healing, unspecified fracture morphology, subsequent encounter  -     XR spine cervical 2 or 3 vw injury; Future    Other closed nondisplaced fracture of sixth cervical vertebra with routine healing, subsequent encounter  -     XR spine cervical 2 or 3 vw injury; Future    Closed nondisplaced fracture of fifth cervical vertebra with routine healing, unspecified fracture morphology, subsequent encounter  -     XR spine cervical 2 or 3 vw injury; Future    Compression fracture of C7 vertebra with routine healing, subsequent encounter  -     XR spine cervical 2 or 3 vw injury; Future    y          Assessment/Plan:    Very pleasant 58-year-old male, accompanied by his wife, returns for his 2 week hospital follow-up  History of fall, 8/5/21  Currently inpatient at [facility]  Anticipate discharge later on this week is reported  Sustained cervical fractures C4, C5, C6, and C7 spinous process as well as C7 compression  Reports she is doing very well  Reports his neck pain is significantly decreased probably 3-4 on a 1-10 scale at the most       Arrives with cervical collar in place reports worn at all times,, slightly loose upon arrival was adjusted  Cervical collar pads do not appear to have been washed, and when queried the wife reports she is not aware they have a been provided any additional   She was given 2 sets today  In addition his wife reports she is not aware that they have a christina collar, for showers on, 1 was provided today for shower use        Has had updated plain films of the cervical spine 8/20/21 these are very carefully reviewed in detail, fractures were identified and appeared to be stable, overall alignment remains stable, studies were compared with prior studies of 8/7/21 plain film as well as CT cervical spine 8/7/21  He is quite hard of hearing however he does not report any new gait or balance problems, does utilizes a walker for gait and balance stability, denies motor or sensory difficulties in the or lower extremities  Denies bowel or bladder incontinence  Has ongoing physical therapy at the center, and is expected to have home therapy upon discharge his wife reports  On exam today he is awake, alert, oriented x3 motor exam the upper extremities 5 x 5 power, reflexes are intact  Sensation is also grossly intact  Cervical range of motion was not evaluated there was very minimal tenderness particularly over the left trapezius     At this juncture further follow-up with Neurosurgery is planned in approximately 1 month with repeat cervical spine studies  He and his wife understand continue with cervical collar at all times, 24/7  Restricted activities are also encouraged, specifically blade lifting, pushing, pulling greater than 10 lb  He may ambulate as tolerated with assistance  I reviewed reviewed with his wife the need to wash the pads on his collar about every 1-2 days, use gentle mild soap, allowed to air dry  He was provided a 2nd set of pads today         I reviewed the need to use a collar for shower time and provided them with this year, which she did not have  These findings, impressions recommendations reviewed in great detail with the patient and his wife expressed understanding and agreement, their questions were answered completely into their satisfaction, they understand the plan  No follow-ups on file  Chief Complaint  Two week follow-up neck pain improved  HPI       The following portions of the patient's history were reviewed and updated as appropriate: allergies, current medications, past family history, past medical history, past social history, past surgical history and problem list     Review of Systems   Constitutional: Negative      HENT: Positive for hearing loss (not wearing b/l hearing aids today)  Eyes: Negative  Respiratory: Negative  Cardiovascular: Negative  Gastrointestinal: Negative  Endocrine: Negative  Genitourinary: Negative  Musculoskeletal: Positive for gait problem (uses walker and wheelchair for long distances )  Negative for neck pain  08/07/2021: C4, 5, 6, and 6 spinous process fractures s/p fall at home- Wearing Collar    Tylenol    PT: YES @ facility    Per spouse, Pt will be discharged [facility] next wednesday       Skin: Negative  Allergic/Immunologic: Negative  Neurological: Negative for weakness and numbness  Hematological: Negative  Psychiatric/Behavioral: Negative  Objective:    Physical Exam  Constitutional:       Appearance: He is well-developed  HENT:      Head: Normocephalic and atraumatic  Eyes:      Pupils: Pupils are equal, round, and reactive to light  Cardiovascular:      Rate and Rhythm: Normal rate  Pulmonary:      Effort: Pulmonary effort is normal       Breath sounds: Normal breath sounds  Skin:     General: Skin is warm and dry  Neurological:      Mental Status: He is alert and oriented to person, place, and time  Neurologic Exam     Mental Status   Oriented to person, place, and time  Cranial Nerves     CN III, IV, VI   Pupils are equal, round, and reactive to light  CERVICAL SPINE   8/20/21     INDICATION:   S12 401A: Unspecified nondisplaced fracture of fifth cervical vertebra, initial encounter for closed fracture  S12 690A: Other displaced fracture of seventh cervical vertebra, initial encounter for closed fracture  S12 390A: Other displaced fracture of fourth cervical vertebra, initial encounter for closed fracture      COMPARISON:  Radiographs August 7, 2021    Correlation CT cervical spine August 7, 2021     VIEWS:  XR SPINE CERVICAL 2 OR 3 VW INJURY         FINDINGS:  Mildly displaced fractures of the C5, C6 and C7 spinous processes are similar to prior radiograph  The additional known C4 spinous process fracture is seen to advantage on the prior CT      No new fracture is identified  The visualized segments of the odontoid are intact      There is multilevel degenerative change of the spine with intervertebral disc space narrowing, and ankylosis of the C5-C6 vertebral bodies      The visualized lung apices are clear    There are atherosclerotic calcifications of the aortic arch      IMPRESSION:     No significant change in appearance of the spinous process fractures

## 2021-09-27 NOTE — PROGRESS NOTES
Office Note - Neurosurgery   Dewayne Brooks 80 y o  male MRN: [id number]      Assessment:  Patient is a 80years old gentleman with history of hypertension, pancreas cyst, prostate cancer,  here today for 6 weeks follow-up of C4-C7 spinous process fractures  History of fall at home  Reports no neck pain, radicular symptoms, numbness, weakness, or paresthesia in the upper extremities  Patient with gait issues and uses walker for ambulation  He cam in brought in by his wife in wheelchair  His wearing [place] cervical collar  Follow-up upright cervical spine x-rays in brace demonstrate more or less stable looking transverse process fractures of C4, C5, C6, and C7  Exam-patient some hearing issues, moves all extremities  Is 5/5 bilaterally and sensation to light touch intact throughout  Good range of motion of cervical spine patient does not have any tenderness on posterior cervical spine palpation  Hx, PEx and images reviewed with the patient and his wife  Management plan discussed  From Neurosurgery perspective, no follow-up imagings required  Patient can follow up on p r n  basis, advised to wean off the brace  Fall precaution  Call with question or concern  Both patient and wife agreed with the management  Plan  Plan:  1  Patient without neck pain, nontender on examination of posterior cervical spine  2  Start weaning of the brace  3  Follow-up on p r n  basis  4  Call with question or concern  Subjective/Objective     C/C: "I do not have pain in my neck"/6 weeks follow-up for multiple SP of cervical spine      HPI  Patient is a 80years old gentleman with history of hypertension, pancreas cyst, prostate cancer,  here today for 6 weeks follow-up of C4-C7 spinous process fractures  History of fall at home  Reports no neck pain, radicular symptoms, numbness, weakness, or paresthesia in the upper extremities  Patient with gait issues and uses walker for ambulation    Patient was brought in by his wife in wheelchair  Patient denies B/B dysfunction  He is wearing [place] collar all the time, and christina collar for shower  Denies taking pain medication  ROS  Review of system personally reviewed and updated  Review of Systems   Constitutional: Negative  HENT: Positive for hearing loss (not wearing b/l hearing aids today)  Eyes: Negative  Respiratory: Negative  Cardiovascular: Negative  Gastrointestinal: Negative  Endocrine: Negative  Genitourinary: Negative  Musculoskeletal: Positive for gait problem (uses walker and wheelchair for long distances )  Negative for neck pain  08/07/2021: C4, 5, 6, and 6 spinous process fractures s/p fall at home- Wearing Collar    Tylenol    PT: YES @ facility    Per spouse, Pt will be discharged [facility] next wednesday       Skin: Negative  Allergic/Immunologic: Negative  Neurological: Negative for weakness and numbness  Hematological: Negative  Psychiatric/Behavioral: Negative  Family History    No family history on file  Social History    Social History     Socioeconomic History    Marital status: /Civil Union     Spouse name: Not on file    Number of children: Not on file    Years of education: Not on file    Highest education level: Not on file   Occupational History    Not on file   Tobacco Use    Smoking status: Never Smoker    Smokeless tobacco: Never Used   Substance and Sexual Activity    Alcohol use: Not Currently    Drug use: Never    Sexual activity: Not on file   Other Topics Concern    Not on file   Social History Narrative    Not on file     Social Determinants of Health     Financial Resource Strain:     Difficulty of Paying Living Expenses:    Food Insecurity:     Worried About Running Out of Food in the Last Year:     [address] in the Last Year:    Transportation Needs:     Lack of Transportation (Medical):      Lack of Transportation (Non-Medical): Physical Activity:     Days of Exercise per Week:     Minutes of Exercise per Session:    Stress:     Feeling of Stress :    Social Connections:     Frequency of Communication with Friends and Family:     Frequency of Social Gatherings with Friends and Family:     Attends [employer] Services:     Active Member of Clubs or Organizations:     Attends Club or Organization Meetings:     Marital Status:    Intimate Partner Violence:     Fear of Current or Ex-Partner:     Emotionally Abused:     Physically Abused:     Sexually Abused:        Past Medical History    Past Medical History:   Diagnosis Date    HLD (hyperlipidemia)     HTN (hypertension)     Prostate CA ([address] )     Skin cancer        Surgical History    Past Surgical History:   Procedure Laterality Date    CYSTOSCOPY         Medications      Current Outpatient Medications:     acetaminophen (TYLENOL) 325 mg tablet, Take 3 tablets (975 mg total) by mouth every 8 (eight) hours, Disp: , Rfl:     amLODIPine (NORVASC) 10 mg tablet, Take 10 mg by mouth daily, Disp: , Rfl:     atorvastatin (LIPITOR) 20 mg tablet, Take 20 mg by mouth daily, Disp: , Rfl:     furosemide (LASIX) 20 mg tablet, Take 20 mg by mouth daily, Disp: , Rfl:     irbesartan (AVAPRO) 150 mg tablet, Take 150 mg by mouth daily (Patient not taking: Reported on 8/25/2021), Disp: , Rfl:     linaGLIPtin 5 MG TABS, Take 5 mg by mouth daily, Disp: , Rfl:     losartan (COZAAR) 50 mg tablet, Take 50 mg by mouth daily, Disp: , Rfl:     metoprolol tartrate (LOPRESSOR) 50 mg tablet, Take 50 mg by mouth, Disp: , Rfl:     senna (SENOKOT) 8 6 mg, Take 2 tablets (17 2 mg total) by mouth daily, Disp: , Rfl:     sitaGLIPtin (JANUVIA) 25 mg tablet, Take 25 mg by mouth daily (Patient not taking: Reported on 8/25/2021), Disp: , Rfl:     tamsulosin (FLOMAX) 0 4 mg, TAKE 1 CAPSULE BY MOUTH EVERY DAY 1/2 HR   FOLLOWING THE SAME MEAL EACH DAY, Disp: , Rfl:     Allergies    No Known Allergies    The following portions of the patient's history were reviewed and updated as appropriate: allergies, current medications, past family history, past medical history, past social history, past surgical history and problem list     Investigations    I personally reviewed the XRAY results with the patient:    Findings as described in the assessment section above    Physical Exam    There were no vitals filed for this visit  Physical Exam  Constitutional:       Appearance: Normal appearance  HENT:      Head: Normocephalic and atraumatic  Eyes:      Extraocular Movements: Extraocular movements intact  Cardiovascular:      Rate and Rhythm: Normal rate  Pulses: Normal pulses  Pulmonary:      Effort: Pulmonary effort is normal    Musculoskeletal:         General: Normal range of motion  Cervical back: Normal range of motion  Neurological:      General: No focal deficit present  Mental Status: He is alert and oriented to person, place, and time  GCS: GCS eye subscore is 4  GCS verbal subscore is 5  GCS motor subscore is 6  Cranial Nerves: Cranial nerves are intact  Sensory: Sensation is intact  Motor: Motor function is intact  Deep Tendon Reflexes: Reflexes are normal and symmetric  Reflex Scores:       Tricep reflexes are 2+ on the right side and 2+ on the left side  Bicep reflexes are 2+ on the right side and 2+ on the left side  Brachioradialis reflexes are 2+ on the right side and 2+ on the left side  Patellar reflexes are 2+ on the right side and 2+ on the left side  Achilles reflexes are 2+ on the right side and 2+ on the left side  Psychiatric:         Speech: Speech normal        Neurologic Exam     Mental Status   Oriented to person, place, and time  Speech: speech is normal   Level of consciousness: alert  Some hearing issues , otherwise non focal     Cranial Nerves     CN III, IV, VI   Right pupil: Size: 2 mm  Left pupil: Size: 2 mm  Nystagmus: none     Motor Exam   Muscle bulk: normal  Overall muscle tone: normal  Right arm tone: normal  Left arm tone: normal  Right arm pronator drift: absent  Left arm pronator drift: absent  Right leg tone: normal  Left leg tone: normal    Sensory Exam   Light touch normal      Gait, Coordination, and Reflexes     Reflexes   Right brachioradialis: 2+  Left brachioradialis: 2+  Right biceps: 2+  Left biceps: 2+  Right triceps: 2+  Left triceps: 2+  Right patellar: 2+  Left patellar: 2+  Right achilles: 2+  Left achilles: 2+  Right : 2+  Left : 2+  Right Martinez: absent  Left Martinez: absent  Right ankle clonus: absent  Left pendular knee jerk: absent

## 2021-12-11 PROBLEM — N17.9 ACUTE KIDNEY INJURY (HCC): Status: ACTIVE | Noted: 2021-01-01

## 2021-12-11 PROBLEM — R79.89 ELEVATED SERUM CREATININE: Status: ACTIVE | Noted: 2021-01-01

## 2021-12-11 PROBLEM — S01.01XA SCALP LACERATION: Status: ACTIVE | Noted: 2021-01-01

## 2021-12-11 PROBLEM — E11.9 TYPE 2 DIABETES MELLITUS (HCC): Chronic | Status: ACTIVE | Noted: 2021-01-01

## 2021-12-11 PROBLEM — R47.81 SLURRED SPEECH: Status: ACTIVE | Noted: 2021-01-01

## 2021-12-12 PROBLEM — D72.829 LEUKOCYTOSIS: Status: ACTIVE | Noted: 2021-01-01

## 2021-12-12 PROBLEM — N18.9 ACUTE KIDNEY INJURY SUPERIMPOSED ON CHRONIC KIDNEY DISEASE (HCC): Status: ACTIVE | Noted: 2021-01-01

## 2021-12-14 PROBLEM — G93.40 ENCEPHALOPATHY: Status: ACTIVE | Noted: 2021-01-01

## 2021-12-14 PROBLEM — R40.0 SOMNOLENCE: Status: ACTIVE | Noted: 2021-01-01

## 2021-12-15 PROBLEM — D72.829 LEUKOCYTOSIS: Status: RESOLVED | Noted: 2021-01-01 | Resolved: 2021-01-01

## 2022-01-01 ENCOUNTER — APPOINTMENT (EMERGENCY)
Dept: RADIOLOGY | Facility: HOSPITAL | Age: 87
DRG: 551 | End: 2022-01-01
Payer: MEDICARE

## 2022-01-01 ENCOUNTER — APPOINTMENT (INPATIENT)
Dept: RADIOLOGY | Facility: HOSPITAL | Age: 87
DRG: 551 | End: 2022-01-01
Payer: MEDICARE

## 2022-01-01 ENCOUNTER — HOSPITAL ENCOUNTER (INPATIENT)
Facility: HOSPITAL | Age: 87
LOS: 3 days | DRG: 551 | End: 2022-05-03
Attending: EMERGENCY MEDICINE | Admitting: INTERNAL MEDICINE
Payer: MEDICARE

## 2022-01-01 ENCOUNTER — HOSPITAL ENCOUNTER (OUTPATIENT)
Facility: HOSPITAL | Age: 87
End: 2022-05-03
Attending: INTERNAL MEDICINE | Admitting: INTERNAL MEDICINE
Payer: MEDICARE

## 2022-01-01 VITALS
WEIGHT: 165.57 LBS | HEART RATE: 88 BPM | BODY MASS INDEX: 21.94 KG/M2 | TEMPERATURE: 96.8 F | OXYGEN SATURATION: 97 % | DIASTOLIC BLOOD PRESSURE: 50 MMHG | RESPIRATION RATE: 16 BRPM | HEIGHT: 73 IN | SYSTOLIC BLOOD PRESSURE: 110 MMHG
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
## 2022-01-01 DIAGNOSIS — R41.82 ALTERED MENTAL STATUS: ICD-10-CM

## 2022-01-01 DIAGNOSIS — S32.019A CLOSED FRACTURE OF FIRST LUMBAR VERTEBRA, UNSPECIFIED FRACTURE MORPHOLOGY, INITIAL ENCOUNTER (HCC): Primary | ICD-10-CM

## 2022-01-01 DIAGNOSIS — G93.40 ENCEPHALOPATHY: ICD-10-CM

## 2022-01-01 DIAGNOSIS — R79.81 ELEVATED PCO2 ON ARTERIAL BLOOD GAS: ICD-10-CM

## 2022-01-01 LAB
2HR DELTA HS TROPONIN: 32 NG/L
4HR DELTA HS TROPONIN: 47 NG/L
ALBUMIN SERPL BCP-MCNC: 3.6 G/DL (ref 3.5–5)
ALP SERPL-CCNC: 89 U/L (ref 46–116)
ALT SERPL W P-5'-P-CCNC: 23 U/L (ref 12–78)
AMMONIA PLAS-SCNC: <10 UMOL/L (ref 11–35)
ANION GAP SERPL CALCULATED.3IONS-SCNC: 10 MMOL/L (ref 4–13)
ANION GAP SERPL CALCULATED.3IONS-SCNC: 11 MMOL/L (ref 4–13)
ANION GAP SERPL CALCULATED.3IONS-SCNC: 8 MMOL/L (ref 4–13)
APTT PPP: 35 SECONDS (ref 23–37)
ARTERIAL PATENCY WRIST A: YES
AST SERPL W P-5'-P-CCNC: 21 U/L (ref 5–45)
ATRIAL RATE: 78 BPM
ATRIAL RATE: 82 BPM
ATRIAL RATE: 89 BPM
BACTERIA UR QL AUTO: ABNORMAL /HPF
BACTERIA UR QL AUTO: NORMAL /HPF
BASE EXCESS BLDA CALC-SCNC: -4.1 MMOL/L
BASE EXCESS BLDA CALC-SCNC: -7.4 MMOL/L
BASE EXCESS BLDA CALC-SCNC: 0.5 MMOL/L
BASOPHILS # BLD AUTO: 0.05 THOUSANDS/ΜL (ref 0–0.1)
BASOPHILS NFR BLD AUTO: 1 % (ref 0–1)
BILIRUB SERPL-MCNC: 0.66 MG/DL (ref 0.2–1)
BILIRUB UR QL STRIP: NEGATIVE
BILIRUB UR QL STRIP: NEGATIVE
BODY TEMPERATURE: 97 DEGREES FEHRENHEIT
BODY TEMPERATURE: 98 DEGREES FEHRENHEIT
BODY TEMPERATURE: 98 DEGREES FEHRENHEIT
BUN SERPL-MCNC: 23 MG/DL (ref 5–25)
BUN SERPL-MCNC: 23 MG/DL (ref 5–25)
BUN SERPL-MCNC: 25 MG/DL (ref 5–25)
CALCIUM SERPL-MCNC: 10 MG/DL (ref 8.3–10.1)
CALCIUM SERPL-MCNC: 9.1 MG/DL (ref 8.3–10.1)
CALCIUM SERPL-MCNC: 9.5 MG/DL (ref 8.3–10.1)
CARDIAC TROPONIN I PNL SERPL HS: 141 NG/L (ref 8–18)
CARDIAC TROPONIN I PNL SERPL HS: 179 NG/L
CARDIAC TROPONIN I PNL SERPL HS: 211 NG/L
CARDIAC TROPONIN I PNL SERPL HS: 226 NG/L
CHLORIDE SERPL-SCNC: 105 MMOL/L (ref 100–108)
CHLORIDE SERPL-SCNC: 106 MMOL/L (ref 100–108)
CHLORIDE SERPL-SCNC: 107 MMOL/L (ref 100–108)
CK MB SERPL-MCNC: 1.7 % (ref 0–2.5)
CK MB SERPL-MCNC: 4.4 NG/ML (ref 0–5)
CK SERPL-CCNC: 258 U/L (ref 39–308)
CLARITY UR: CLEAR
CLARITY UR: CLEAR
CO2 SERPL-SCNC: 25 MMOL/L (ref 21–32)
CO2 SERPL-SCNC: 26 MMOL/L (ref 21–32)
CO2 SERPL-SCNC: 28 MMOL/L (ref 21–32)
COLOR UR: ABNORMAL
COLOR UR: YELLOW
CREAT SERPL-MCNC: 1.37 MG/DL (ref 0.6–1.3)
CREAT SERPL-MCNC: 1.43 MG/DL (ref 0.6–1.3)
CREAT SERPL-MCNC: 1.63 MG/DL (ref 0.6–1.3)
EOSINOPHIL # BLD AUTO: 0.04 THOUSAND/ΜL (ref 0–0.61)
EOSINOPHIL NFR BLD AUTO: 0 % (ref 0–6)
ERYTHROCYTE [DISTWIDTH] IN BLOOD BY AUTOMATED COUNT: 12.8 % (ref 11.6–15.1)
ERYTHROCYTE [DISTWIDTH] IN BLOOD BY AUTOMATED COUNT: 12.8 % (ref 11.6–15.1)
ERYTHROCYTE [DISTWIDTH] IN BLOOD BY AUTOMATED COUNT: 13.1 % (ref 11.6–15.1)
EST. AVERAGE GLUCOSE BLD GHB EST-MCNC: 157 MG/DL
FLUAV RNA RESP QL NAA+PROBE: NEGATIVE
FLUBV RNA RESP QL NAA+PROBE: NEGATIVE
GFR SERPL CREATININE-BSD FRML MDRD: 35 ML/MIN/1.73SQ M
GFR SERPL CREATININE-BSD FRML MDRD: 41 ML/MIN/1.73SQ M
GFR SERPL CREATININE-BSD FRML MDRD: 44 ML/MIN/1.73SQ M
GLUCOSE SERPL-MCNC: 121 MG/DL (ref 65–140)
GLUCOSE SERPL-MCNC: 121 MG/DL (ref 65–140)
GLUCOSE SERPL-MCNC: 138 MG/DL (ref 65–140)
GLUCOSE SERPL-MCNC: 140 MG/DL (ref 65–140)
GLUCOSE SERPL-MCNC: 141 MG/DL (ref 65–140)
GLUCOSE SERPL-MCNC: 144 MG/DL (ref 65–140)
GLUCOSE SERPL-MCNC: 149 MG/DL (ref 65–140)
GLUCOSE SERPL-MCNC: 149 MG/DL (ref 65–140)
GLUCOSE SERPL-MCNC: 151 MG/DL (ref 65–140)
GLUCOSE SERPL-MCNC: 155 MG/DL (ref 65–140)
GLUCOSE SERPL-MCNC: 155 MG/DL (ref 65–140)
GLUCOSE SERPL-MCNC: 159 MG/DL (ref 65–140)
GLUCOSE SERPL-MCNC: 160 MG/DL (ref 65–140)
GLUCOSE SERPL-MCNC: 163 MG/DL (ref 65–140)
GLUCOSE SERPL-MCNC: 172 MG/DL (ref 65–140)
GLUCOSE SERPL-MCNC: 173 MG/DL (ref 65–140)
GLUCOSE SERPL-MCNC: 174 MG/DL (ref 65–140)
GLUCOSE SERPL-MCNC: 196 MG/DL (ref 65–140)
GLUCOSE SERPL-MCNC: 237 MG/DL (ref 65–140)
GLUCOSE SERPL-MCNC: 242 MG/DL (ref 65–140)
GLUCOSE SERPL-MCNC: 251 MG/DL (ref 65–140)
GLUCOSE SERPL-MCNC: 269 MG/DL (ref 65–140)
GLUCOSE UR STRIP-MCNC: ABNORMAL MG/DL
GLUCOSE UR STRIP-MCNC: NEGATIVE MG/DL
HBA1C MFR BLD: 7.1 %
HCO3 BLDA-SCNC: 21.6 MMOL/L (ref 22–28)
HCO3 BLDA-SCNC: 24.1 MMOL/L (ref 22–28)
HCO3 BLDA-SCNC: 24.5 MMOL/L (ref 22–28)
HCT VFR BLD AUTO: 34.2 % (ref 36.5–49.3)
HCT VFR BLD AUTO: 34.5 % (ref 36.5–49.3)
HCT VFR BLD AUTO: 37.7 % (ref 36.5–49.3)
HGB BLD-MCNC: 11.3 G/DL (ref 12–17)
HGB BLD-MCNC: 11.5 G/DL (ref 12–17)
HGB BLD-MCNC: 12.6 G/DL (ref 12–17)
HGB UR QL STRIP.AUTO: ABNORMAL
HGB UR QL STRIP.AUTO: ABNORMAL
IMM GRANULOCYTES # BLD AUTO: 0.03 THOUSAND/UL (ref 0–0.2)
IMM GRANULOCYTES NFR BLD AUTO: 0 % (ref 0–2)
INR PPP: 0.96 (ref 0.84–1.19)
IPAP: 16
IPAP: 16
KETONES UR STRIP-MCNC: ABNORMAL MG/DL
KETONES UR STRIP-MCNC: ABNORMAL MG/DL
LACTATE SERPL-SCNC: 1 MMOL/L (ref 0.5–2)
LEUKOCYTE ESTERASE UR QL STRIP: NEGATIVE
LEUKOCYTE ESTERASE UR QL STRIP: NEGATIVE
LYMPHOCYTES # BLD AUTO: 1.16 THOUSANDS/ΜL (ref 0.6–4.47)
LYMPHOCYTES NFR BLD AUTO: 11 % (ref 14–44)
MCH RBC QN AUTO: 29.5 PG (ref 26.8–34.3)
MCH RBC QN AUTO: 29.7 PG (ref 26.8–34.3)
MCH RBC QN AUTO: 29.9 PG (ref 26.8–34.3)
MCHC RBC AUTO-ENTMCNC: 33 G/DL (ref 31.4–37.4)
MCHC RBC AUTO-ENTMCNC: 33.3 G/DL (ref 31.4–37.4)
MCHC RBC AUTO-ENTMCNC: 33.4 G/DL (ref 31.4–37.4)
MCV RBC AUTO: 89 FL (ref 82–98)
MCV RBC AUTO: 89 FL (ref 82–98)
MCV RBC AUTO: 90 FL (ref 82–98)
MONOCYTES # BLD AUTO: 1.04 THOUSAND/ΜL (ref 0.17–1.22)
MONOCYTES NFR BLD AUTO: 10 % (ref 4–12)
NEUTROPHILS # BLD AUTO: 8.08 THOUSANDS/ΜL (ref 1.85–7.62)
NEUTS SEG NFR BLD AUTO: 78 % (ref 43–75)
NITRITE UR QL STRIP: NEGATIVE
NITRITE UR QL STRIP: NEGATIVE
NON VENT ROOM AIR: NORMAL %
NON VENT- BIPAP: ABNORMAL
NON VENT- BIPAP: ABNORMAL
NON-SQ EPI CELLS URNS QL MICRO: ABNORMAL /HPF
NON-SQ EPI CELLS URNS QL MICRO: NORMAL /HPF
NRBC BLD AUTO-RTO: 0 /100 WBCS
O2 CT BLDA-SCNC: 15.4 ML/DL (ref 16–23)
O2 CT BLDA-SCNC: 16.4 ML/DL (ref 16–23)
O2 CT BLDA-SCNC: 16.6 ML/DL (ref 16–23)
OXYHGB MFR BLDA: 94.7 % (ref 94–97)
OXYHGB MFR BLDA: 96.6 % (ref 94–97)
OXYHGB MFR BLDA: 98.7 % (ref 94–97)
P AXIS: -15 DEGREES
P AXIS: 67 DEGREES
P AXIS: 71 DEGREES
PCO2 BLDA: 37.4 MM HG (ref 36–44)
PCO2 BLDA: 42.2 MM HG (ref 36–44)
PCO2 BLDA: 85.2 MM HG (ref 36–44)
PCO2 TEMP ADJ BLDA: 36.9 MM HG (ref 36–44)
PCO2 TEMP ADJ BLDA: 40.6 MM HG (ref 36–44)
PCO2 TEMP ADJ BLDA: 84.1 MM HG (ref 36–44)
PEEP MAX SETTING VENT: 8 CM[H2O]
PEEP MAX SETTING VENT: 8 CM[H2O]
PH BLD: 7.07 [PH] (ref 7.35–7.45)
PH BLD: 7.34 [PH] (ref 7.35–7.45)
PH BLD: 7.44 [PH] (ref 7.35–7.45)
PH BLDA: 7.07 [PH] (ref 7.35–7.45)
PH BLDA: 7.33 [PH] (ref 7.35–7.45)
PH BLDA: 7.43 [PH] (ref 7.35–7.45)
PH UR STRIP.AUTO: 5.5 [PH]
PH UR STRIP.AUTO: 6 [PH]
PLATELET # BLD AUTO: 213 THOUSANDS/UL (ref 149–390)
PLATELET # BLD AUTO: 243 THOUSANDS/UL (ref 149–390)
PLATELET # BLD AUTO: 283 THOUSANDS/UL (ref 149–390)
PMV BLD AUTO: 10 FL (ref 8.9–12.7)
PMV BLD AUTO: 9.7 FL (ref 8.9–12.7)
PMV BLD AUTO: 9.7 FL (ref 8.9–12.7)
PO2 BLD: 117.3 MM HG (ref 75–129)
PO2 BLD: 218.9 MM HG (ref 75–129)
PO2 BLD: 76.8 MM HG (ref 75–129)
PO2 BLDA: 119.2 MM HG (ref 75–129)
PO2 BLDA: 223.3 MM HG (ref 75–129)
PO2 BLDA: 78.3 MM HG (ref 75–129)
POTASSIUM SERPL-SCNC: 3.2 MMOL/L (ref 3.5–5.3)
POTASSIUM SERPL-SCNC: 3.6 MMOL/L (ref 3.5–5.3)
POTASSIUM SERPL-SCNC: 4.2 MMOL/L (ref 3.5–5.3)
PR INTERVAL: 194 MS
PR INTERVAL: 206 MS
PR INTERVAL: 226 MS
PROCALCITONIN SERPL-MCNC: 0.1 NG/ML
PROT SERPL-MCNC: 7 G/DL (ref 6.4–8.2)
PROT UR STRIP-MCNC: >=300 MG/DL
PROT UR STRIP-MCNC: ABNORMAL MG/DL
PROTHROMBIN TIME: 12.6 SECONDS (ref 11.6–14.5)
QRS AXIS: -5 DEGREES
QRS AXIS: 10 DEGREES
QRS AXIS: 11 DEGREES
QRSD INTERVAL: 80 MS
QRSD INTERVAL: 80 MS
QRSD INTERVAL: 84 MS
QT INTERVAL: 368 MS
QT INTERVAL: 390 MS
QT INTERVAL: 396 MS
QTC INTERVAL: 447 MS
QTC INTERVAL: 451 MS
QTC INTERVAL: 455 MS
RBC # BLD AUTO: 3.83 MILLION/UL (ref 3.88–5.62)
RBC # BLD AUTO: 3.85 MILLION/UL (ref 3.88–5.62)
RBC # BLD AUTO: 4.24 MILLION/UL (ref 3.88–5.62)
RBC #/AREA URNS AUTO: ABNORMAL /HPF
RBC #/AREA URNS AUTO: NORMAL /HPF
RSV RNA RESP QL NAA+PROBE: NEGATIVE
SARS-COV-2 RNA RESP QL NAA+PROBE: NEGATIVE
SODIUM SERPL-SCNC: 141 MMOL/L (ref 136–145)
SODIUM SERPL-SCNC: 142 MMOL/L (ref 136–145)
SODIUM SERPL-SCNC: 143 MMOL/L (ref 136–145)
SP GR UR STRIP.AUTO: 1.02 (ref 1–1.03)
SP GR UR STRIP.AUTO: >=1.03 (ref 1–1.03)
SPECIMEN SOURCE: ABNORMAL
SPECIMEN SOURCE: ABNORMAL
SPECIMEN SOURCE: NORMAL
T WAVE AXIS: 38 DEGREES
T WAVE AXIS: 42 DEGREES
T WAVE AXIS: 5 DEGREES
TSH SERPL DL<=0.05 MIU/L-ACNC: 2.03 UIU/ML (ref 0.45–4.5)
UROBILINOGEN UR QL STRIP.AUTO: 0.2 E.U./DL
UROBILINOGEN UR QL STRIP.AUTO: 0.2 E.U./DL
VENT BIPAP FIO2: 50 %
VENT BIPAP FIO2: 65 %
VENTRICULAR RATE: 78 BPM
VENTRICULAR RATE: 82 BPM
VENTRICULAR RATE: 89 BPM
WBC # BLD AUTO: 10.4 THOUSAND/UL (ref 4.31–10.16)
WBC # BLD AUTO: 10.49 THOUSAND/UL (ref 4.31–10.16)
WBC # BLD AUTO: 13.7 THOUSAND/UL (ref 4.31–10.16)
WBC #/AREA URNS AUTO: ABNORMAL /HPF
WBC #/AREA URNS AUTO: NORMAL /HPF

## 2022-01-01 PROCEDURE — 99232 SBSQ HOSP IP/OBS MODERATE 35: CPT | Performed by: NURSE PRACTITIONER

## 2022-01-01 PROCEDURE — 80053 COMPREHEN METABOLIC PANEL: CPT | Performed by: PHYSICIAN ASSISTANT

## 2022-01-01 PROCEDURE — 85610 PROTHROMBIN TIME: CPT | Performed by: PHYSICIAN ASSISTANT

## 2022-01-01 PROCEDURE — 82553 CREATINE MB FRACTION: CPT | Performed by: PHYSICIAN ASSISTANT

## 2022-01-01 PROCEDURE — 97167 OT EVAL HIGH COMPLEX 60 MIN: CPT

## 2022-01-01 PROCEDURE — 96365 THER/PROPH/DIAG IV INF INIT: CPT

## 2022-01-01 PROCEDURE — G0379 DIRECT REFER HOSPITAL OBSERV: HCPCS

## 2022-01-01 PROCEDURE — 99285 EMERGENCY DEPT VISIT HI MDM: CPT | Performed by: PHYSICIAN ASSISTANT

## 2022-01-01 PROCEDURE — 82805 BLOOD GASES W/O2 SATURATION: CPT | Performed by: NURSE PRACTITIONER

## 2022-01-01 PROCEDURE — 71045 X-RAY EXAM CHEST 1 VIEW: CPT

## 2022-01-01 PROCEDURE — 94760 N-INVAS EAR/PLS OXIMETRY 1: CPT

## 2022-01-01 PROCEDURE — 97530 THERAPEUTIC ACTIVITIES: CPT

## 2022-01-01 PROCEDURE — 82550 ASSAY OF CK (CPK): CPT | Performed by: PHYSICIAN ASSISTANT

## 2022-01-01 PROCEDURE — 82140 ASSAY OF AMMONIA: CPT | Performed by: NURSE PRACTITIONER

## 2022-01-01 PROCEDURE — 99447 NTRPROF PH1/NTRNET/EHR 11-20: CPT | Performed by: PHYSICIAN ASSISTANT

## 2022-01-01 PROCEDURE — NC001 PR NO CHARGE: Performed by: NURSE PRACTITIONER

## 2022-01-01 PROCEDURE — 97760 ORTHOTIC MGMT&TRAING 1ST ENC: CPT

## 2022-01-01 PROCEDURE — 82948 REAGENT STRIP/BLOOD GLUCOSE: CPT

## 2022-01-01 PROCEDURE — 97163 PT EVAL HIGH COMPLEX 45 MIN: CPT

## 2022-01-01 PROCEDURE — 80048 BASIC METABOLIC PNL TOTAL CA: CPT | Performed by: NURSE PRACTITIONER

## 2022-01-01 PROCEDURE — 72125 CT NECK SPINE W/O DYE: CPT

## 2022-01-01 PROCEDURE — 97110 THERAPEUTIC EXERCISES: CPT

## 2022-01-01 PROCEDURE — 93010 ELECTROCARDIOGRAM REPORT: CPT | Performed by: INTERNAL MEDICINE

## 2022-01-01 PROCEDURE — 0241U HB NFCT DS VIR RESP RNA 4 TRGT: CPT | Performed by: PHYSICIAN ASSISTANT

## 2022-01-01 PROCEDURE — 83036 HEMOGLOBIN GLYCOSYLATED A1C: CPT | Performed by: HOSPITALIST

## 2022-01-01 PROCEDURE — G1004 CDSM NDSC: HCPCS

## 2022-01-01 PROCEDURE — 92610 EVALUATE SWALLOWING FUNCTION: CPT

## 2022-01-01 PROCEDURE — 99223 1ST HOSP IP/OBS HIGH 75: CPT | Performed by: HOSPITALIST

## 2022-01-01 PROCEDURE — 94002 VENT MGMT INPAT INIT DAY: CPT

## 2022-01-01 PROCEDURE — 99285 EMERGENCY DEPT VISIT HI MDM: CPT

## 2022-01-01 PROCEDURE — 85025 COMPLETE CBC W/AUTO DIFF WBC: CPT | Performed by: PHYSICIAN ASSISTANT

## 2022-01-01 PROCEDURE — 84443 ASSAY THYROID STIM HORMONE: CPT | Performed by: PHYSICIAN ASSISTANT

## 2022-01-01 PROCEDURE — 99233 SBSQ HOSP IP/OBS HIGH 50: CPT | Performed by: PHYSICIAN ASSISTANT

## 2022-01-01 PROCEDURE — 93005 ELECTROCARDIOGRAM TRACING: CPT

## 2022-01-01 PROCEDURE — 36600 WITHDRAWAL OF ARTERIAL BLOOD: CPT

## 2022-01-01 PROCEDURE — 85027 COMPLETE CBC AUTOMATED: CPT | Performed by: NURSE PRACTITIONER

## 2022-01-01 PROCEDURE — 70551 MRI BRAIN STEM W/O DYE: CPT

## 2022-01-01 PROCEDURE — 85730 THROMBOPLASTIN TIME PARTIAL: CPT | Performed by: PHYSICIAN ASSISTANT

## 2022-01-01 PROCEDURE — 84484 ASSAY OF TROPONIN QUANT: CPT | Performed by: PHYSICIAN ASSISTANT

## 2022-01-01 PROCEDURE — 96375 TX/PRO/DX INJ NEW DRUG ADDON: CPT

## 2022-01-01 PROCEDURE — 96368 THER/DIAG CONCURRENT INF: CPT

## 2022-01-01 PROCEDURE — 84145 PROCALCITONIN (PCT): CPT | Performed by: PHYSICIAN ASSISTANT

## 2022-01-01 PROCEDURE — 36415 COLL VENOUS BLD VENIPUNCTURE: CPT | Performed by: PHYSICIAN ASSISTANT

## 2022-01-01 PROCEDURE — 83605 ASSAY OF LACTIC ACID: CPT | Performed by: PHYSICIAN ASSISTANT

## 2022-01-01 PROCEDURE — 81001 URINALYSIS AUTO W/SCOPE: CPT | Performed by: NURSE PRACTITIONER

## 2022-01-01 PROCEDURE — 87040 BLOOD CULTURE FOR BACTERIA: CPT | Performed by: PHYSICIAN ASSISTANT

## 2022-01-01 PROCEDURE — 81001 URINALYSIS AUTO W/SCOPE: CPT | Performed by: PHYSICIAN ASSISTANT

## 2022-01-01 PROCEDURE — 70450 CT HEAD/BRAIN W/O DYE: CPT

## 2022-01-01 PROCEDURE — 74176 CT ABD & PELVIS W/O CONTRAST: CPT

## 2022-01-01 RX ORDER — IRBESARTAN 150 MG/1
150 TABLET ORAL
COMMUNITY

## 2022-01-01 RX ORDER — ACETAMINOPHEN 325 MG/1
650 TABLET ORAL EVERY 6 HOURS SCHEDULED
Status: DISCONTINUED | OUTPATIENT
Start: 2022-01-01 | End: 2022-01-01 | Stop reason: HOSPADM

## 2022-01-01 RX ORDER — QUETIAPINE FUMARATE 25 MG/1
25 TABLET, FILM COATED ORAL
Status: DISCONTINUED | OUTPATIENT
Start: 2022-01-01 | End: 2022-01-01 | Stop reason: HOSPADM

## 2022-01-01 RX ORDER — OXYCODONE HYDROCHLORIDE 5 MG/1
5 TABLET ORAL EVERY 6 HOURS PRN
Status: DISCONTINUED | OUTPATIENT
Start: 2022-01-01 | End: 2022-01-01 | Stop reason: HOSPADM

## 2022-01-01 RX ORDER — INSULIN LISPRO 100 [IU]/ML
1-6 INJECTION, SOLUTION INTRAVENOUS; SUBCUTANEOUS
Status: DISCONTINUED | OUTPATIENT
Start: 2022-01-01 | End: 2022-01-01 | Stop reason: HOSPADM

## 2022-01-01 RX ORDER — LORAZEPAM 2 MG/ML
0.5 INJECTION INTRAMUSCULAR EVERY 6 HOURS PRN
Status: CANCELLED | OUTPATIENT
Start: 2022-01-01

## 2022-01-01 RX ORDER — ASPIRIN 81 MG/1
81 TABLET, CHEWABLE ORAL DAILY
COMMUNITY

## 2022-01-01 RX ORDER — TAMSULOSIN HYDROCHLORIDE 0.4 MG/1
0.4 CAPSULE ORAL
Status: DISCONTINUED | OUTPATIENT
Start: 2022-01-01 | End: 2022-01-01 | Stop reason: HOSPADM

## 2022-01-01 RX ORDER — LORAZEPAM 2 MG/ML
0.5 INJECTION INTRAMUSCULAR EVERY 6 HOURS PRN
Status: DISCONTINUED | OUTPATIENT
Start: 2022-01-01 | End: 2022-01-01 | Stop reason: HOSPADM

## 2022-01-01 RX ORDER — LOSARTAN POTASSIUM 50 MG/1
50 TABLET ORAL DAILY
Status: DISCONTINUED | OUTPATIENT
Start: 2022-01-01 | End: 2022-01-01 | Stop reason: HOSPADM

## 2022-01-01 RX ORDER — OXYCODONE HYDROCHLORIDE AND ACETAMINOPHEN 5; 325 MG/1; MG/1
1 TABLET ORAL EVERY 4 HOURS PRN
Status: DISCONTINUED | OUTPATIENT
Start: 2022-01-01 | End: 2022-01-01

## 2022-01-01 RX ORDER — SCOLOPAMINE TRANSDERMAL SYSTEM 1 MG/1
1 PATCH, EXTENDED RELEASE TRANSDERMAL
Status: CANCELLED | OUTPATIENT
Start: 2022-05-06

## 2022-01-01 RX ORDER — LIDOCAINE 50 MG/G
1 PATCH TOPICAL DAILY
Status: DISCONTINUED | OUTPATIENT
Start: 2022-01-01 | End: 2022-01-01 | Stop reason: HOSPADM

## 2022-01-01 RX ORDER — OXYCODONE HYDROCHLORIDE 5 MG/1
2.5 TABLET ORAL EVERY 4 HOURS PRN
Status: DISCONTINUED | OUTPATIENT
Start: 2022-01-01 | End: 2022-01-01 | Stop reason: HOSPADM

## 2022-01-01 RX ORDER — HYDRALAZINE HYDROCHLORIDE 20 MG/ML
5 INJECTION INTRAMUSCULAR; INTRAVENOUS EVERY 6 HOURS PRN
Status: DISCONTINUED | OUTPATIENT
Start: 2022-01-01 | End: 2022-01-01 | Stop reason: HOSPADM

## 2022-01-01 RX ORDER — SENNOSIDES 8.6 MG
1 TABLET ORAL
Status: DISCONTINUED | OUTPATIENT
Start: 2022-01-01 | End: 2022-01-01 | Stop reason: HOSPADM

## 2022-01-01 RX ORDER — ACETAMINOPHEN 650 MG/1
650 SUPPOSITORY RECTAL EVERY 6 HOURS PRN
Status: DISCONTINUED | OUTPATIENT
Start: 2022-01-01 | End: 2022-01-01 | Stop reason: HOSPADM

## 2022-01-01 RX ORDER — MELATONIN 10 MG
10 CAPSULE ORAL
COMMUNITY

## 2022-01-01 RX ORDER — CEFTRIAXONE 1 G/50ML
1000 INJECTION, SOLUTION INTRAVENOUS EVERY 24 HOURS
Status: DISCONTINUED | OUTPATIENT
Start: 2022-01-01 | End: 2022-01-01 | Stop reason: HOSPADM

## 2022-01-01 RX ORDER — METOPROLOL TARTRATE 50 MG/1
50 TABLET, FILM COATED ORAL EVERY 12 HOURS SCHEDULED
Status: DISCONTINUED | OUTPATIENT
Start: 2022-01-01 | End: 2022-01-01 | Stop reason: HOSPADM

## 2022-01-01 RX ORDER — INSULIN LISPRO 100 [IU]/ML
1-5 INJECTION, SOLUTION INTRAVENOUS; SUBCUTANEOUS
Status: DISCONTINUED | OUTPATIENT
Start: 2022-01-01 | End: 2022-01-01 | Stop reason: HOSPADM

## 2022-01-01 RX ORDER — ATORVASTATIN CALCIUM 20 MG/1
20 TABLET, FILM COATED ORAL
Status: DISCONTINUED | OUTPATIENT
Start: 2022-01-01 | End: 2022-01-01 | Stop reason: HOSPADM

## 2022-01-01 RX ORDER — SODIUM CHLORIDE 9 MG/ML
50 INJECTION, SOLUTION INTRAVENOUS CONTINUOUS
Status: DISCONTINUED | OUTPATIENT
Start: 2022-01-01 | End: 2022-01-01

## 2022-01-01 RX ORDER — MORPHINE SULFATE 4 MG/ML
4 INJECTION, SOLUTION INTRAMUSCULAR; INTRAVENOUS
Status: DISCONTINUED | OUTPATIENT
Start: 2022-01-01 | End: 2022-01-01 | Stop reason: HOSPADM

## 2022-01-01 RX ORDER — AMLODIPINE BESYLATE 10 MG/1
10 TABLET ORAL DAILY
Status: DISCONTINUED | OUTPATIENT
Start: 2022-01-01 | End: 2022-01-01 | Stop reason: HOSPADM

## 2022-01-01 RX ORDER — LANOLIN ALCOHOL/MO/W.PET/CERES
6 CREAM (GRAM) TOPICAL
Status: DISCONTINUED | OUTPATIENT
Start: 2022-01-01 | End: 2022-01-01 | Stop reason: HOSPADM

## 2022-01-01 RX ORDER — DOCUSATE SODIUM 100 MG/1
100 CAPSULE, LIQUID FILLED ORAL 2 TIMES DAILY PRN
Status: DISCONTINUED | OUTPATIENT
Start: 2022-01-01 | End: 2022-01-01 | Stop reason: HOSPADM

## 2022-01-01 RX ORDER — LABETALOL HYDROCHLORIDE 5 MG/ML
10 INJECTION, SOLUTION INTRAVENOUS ONCE
Status: COMPLETED | OUTPATIENT
Start: 2022-01-01 | End: 2022-01-01

## 2022-01-01 RX ORDER — POTASSIUM CHLORIDE 29.8 MG/ML
40 INJECTION INTRAVENOUS ONCE
Status: DISCONTINUED | OUTPATIENT
Start: 2022-01-01 | End: 2022-01-01

## 2022-01-01 RX ORDER — SCOLOPAMINE TRANSDERMAL SYSTEM 1 MG/1
1 PATCH, EXTENDED RELEASE TRANSDERMAL
Status: DISCONTINUED | OUTPATIENT
Start: 2022-05-06 | End: 2022-01-01 | Stop reason: HOSPADM

## 2022-01-01 RX ORDER — HEPARIN SODIUM 5000 [USP'U]/ML
5000 INJECTION, SOLUTION INTRAVENOUS; SUBCUTANEOUS EVERY 8 HOURS SCHEDULED
Status: DISCONTINUED | OUTPATIENT
Start: 2022-01-01 | End: 2022-01-01 | Stop reason: HOSPADM

## 2022-01-01 RX ORDER — ACETAMINOPHEN 325 MG/1
650 TABLET ORAL EVERY 6 HOURS PRN
Status: DISCONTINUED | OUTPATIENT
Start: 2022-01-01 | End: 2022-01-01

## 2022-01-01 RX ORDER — POTASSIUM CHLORIDE 14.9 MG/ML
20 INJECTION INTRAVENOUS ONCE
Status: COMPLETED | OUTPATIENT
Start: 2022-01-01 | End: 2022-01-01

## 2022-01-01 RX ORDER — LABETALOL HYDROCHLORIDE 5 MG/ML
10 INJECTION, SOLUTION INTRAVENOUS EVERY 4 HOURS PRN
Status: DISCONTINUED | OUTPATIENT
Start: 2022-01-01 | End: 2022-01-01 | Stop reason: HOSPADM

## 2022-01-01 RX ORDER — ASPIRIN 81 MG/1
81 TABLET, CHEWABLE ORAL DAILY
Status: DISCONTINUED | OUTPATIENT
Start: 2022-01-01 | End: 2022-01-01 | Stop reason: HOSPADM

## 2022-01-01 RX ORDER — SCOLOPAMINE TRANSDERMAL SYSTEM 1 MG/1
1 PATCH, EXTENDED RELEASE TRANSDERMAL
Status: DISCONTINUED | OUTPATIENT
Start: 2022-01-01 | End: 2022-01-01 | Stop reason: HOSPADM

## 2022-01-01 RX ADMIN — HEPARIN SODIUM 5000 UNITS: 5000 INJECTION INTRAVENOUS; SUBCUTANEOUS at 06:06

## 2022-01-01 RX ADMIN — TAMSULOSIN HYDROCHLORIDE 0.4 MG: 0.4 CAPSULE ORAL at 16:20

## 2022-01-01 RX ADMIN — METOPROLOL TARTRATE 50 MG: 50 TABLET, FILM COATED ORAL at 21:36

## 2022-01-01 RX ADMIN — LOSARTAN POTASSIUM 50 MG: 50 TABLET, FILM COATED ORAL at 08:35

## 2022-01-01 RX ADMIN — PIPERACILLIN AND TAZOBACTAM 3.38 G: 3; .375 INJECTION, POWDER, LYOPHILIZED, FOR SOLUTION INTRAVENOUS at 11:09

## 2022-01-01 RX ADMIN — Medication 6 MG: at 21:35

## 2022-01-01 RX ADMIN — ATORVASTATIN CALCIUM 20 MG: 20 TABLET, FILM COATED ORAL at 17:03

## 2022-01-01 RX ADMIN — INSULIN LISPRO 1 UNITS: 100 INJECTION, SOLUTION INTRAVENOUS; SUBCUTANEOUS at 07:39

## 2022-01-01 RX ADMIN — LORAZEPAM 0.5 MG: 2 INJECTION INTRAMUSCULAR; INTRAVENOUS at 13:38

## 2022-01-01 RX ADMIN — ACETAMINOPHEN 650 MG: 325 TABLET, FILM COATED ORAL at 05:40

## 2022-01-01 RX ADMIN — INSULIN LISPRO 1 UNITS: 100 INJECTION, SOLUTION INTRAVENOUS; SUBCUTANEOUS at 14:16

## 2022-01-01 RX ADMIN — MORPHINE SULFATE 2 MG: 2 INJECTION, SOLUTION INTRAMUSCULAR; INTRAVENOUS at 13:04

## 2022-01-01 RX ADMIN — INSULIN LISPRO 1 UNITS: 100 INJECTION, SOLUTION INTRAVENOUS; SUBCUTANEOUS at 21:16

## 2022-01-01 RX ADMIN — QUETIAPINE FUMARATE 25 MG: 25 TABLET ORAL at 21:15

## 2022-01-01 RX ADMIN — SENNOSIDES 8.6 MG: 8.6 TABLET, FILM COATED ORAL at 21:21

## 2022-01-01 RX ADMIN — QUETIAPINE FUMARATE 25 MG: 25 TABLET ORAL at 21:21

## 2022-01-01 RX ADMIN — ATORVASTATIN CALCIUM 20 MG: 20 TABLET, FILM COATED ORAL at 16:19

## 2022-01-01 RX ADMIN — AMLODIPINE BESYLATE 10 MG: 10 TABLET ORAL at 09:03

## 2022-01-01 RX ADMIN — MORPHINE SULFATE 2 MG: 2 INJECTION, SOLUTION INTRAMUSCULAR; INTRAVENOUS at 15:20

## 2022-01-01 RX ADMIN — HEPARIN SODIUM 5000 UNITS: 5000 INJECTION INTRAVENOUS; SUBCUTANEOUS at 05:41

## 2022-01-01 RX ADMIN — Medication 6 MG: at 21:21

## 2022-01-01 RX ADMIN — LABETALOL HYDROCHLORIDE 10 MG: 5 INJECTION, SOLUTION INTRAVENOUS at 17:15

## 2022-01-01 RX ADMIN — ACETAMINOPHEN 650 MG: 325 TABLET, FILM COATED ORAL at 17:31

## 2022-01-01 RX ADMIN — METOPROLOL TARTRATE 50 MG: 50 TABLET, FILM COATED ORAL at 08:35

## 2022-01-01 RX ADMIN — LABETALOL HYDROCHLORIDE 10 MG: 5 INJECTION, SOLUTION INTRAVENOUS at 22:35

## 2022-01-01 RX ADMIN — LABETALOL HYDROCHLORIDE 10 MG: 5 INJECTION, SOLUTION INTRAVENOUS at 11:58

## 2022-01-01 RX ADMIN — METOPROLOL TARTRATE 50 MG: 50 TABLET, FILM COATED ORAL at 09:03

## 2022-01-01 RX ADMIN — HEPARIN SODIUM 5000 UNITS: 5000 INJECTION INTRAVENOUS; SUBCUTANEOUS at 21:36

## 2022-01-01 RX ADMIN — HEPARIN SODIUM 5000 UNITS: 5000 INJECTION INTRAVENOUS; SUBCUTANEOUS at 21:21

## 2022-01-01 RX ADMIN — ACETAMINOPHEN 650 MG: 325 TABLET, FILM COATED ORAL at 12:41

## 2022-01-01 RX ADMIN — ACETAMINOPHEN 650 MG: 325 TABLET, FILM COATED ORAL at 05:21

## 2022-01-01 RX ADMIN — ASPIRIN 81 MG CHEWABLE TABLET 81 MG: 81 TABLET CHEWABLE at 09:03

## 2022-01-01 RX ADMIN — LOSARTAN POTASSIUM 50 MG: 50 TABLET, FILM COATED ORAL at 09:03

## 2022-01-01 RX ADMIN — INSULIN LISPRO 1 UNITS: 100 INJECTION, SOLUTION INTRAVENOUS; SUBCUTANEOUS at 14:47

## 2022-01-01 RX ADMIN — ATORVASTATIN CALCIUM 20 MG: 20 TABLET, FILM COATED ORAL at 16:48

## 2022-01-01 RX ADMIN — SODIUM CHLORIDE 50 ML/HR: 0.9 INJECTION, SOLUTION INTRAVENOUS at 18:32

## 2022-01-01 RX ADMIN — METOPROLOL TARTRATE 50 MG: 50 TABLET, FILM COATED ORAL at 21:17

## 2022-01-01 RX ADMIN — ACETAMINOPHEN 650 MG: 325 TABLET, FILM COATED ORAL at 17:55

## 2022-01-01 RX ADMIN — SODIUM CHLORIDE, SODIUM LACTATE, POTASSIUM CHLORIDE, AND CALCIUM CHLORIDE 1000 ML: .6; .31; .03; .02 INJECTION, SOLUTION INTRAVENOUS at 11:09

## 2022-01-01 RX ADMIN — HYDRALAZINE HYDROCHLORIDE 5 MG: 20 INJECTION INTRAMUSCULAR; INTRAVENOUS at 19:48

## 2022-01-01 RX ADMIN — TAMSULOSIN HYDROCHLORIDE 0.4 MG: 0.4 CAPSULE ORAL at 17:03

## 2022-01-01 RX ADMIN — CEFTRIAXONE 1000 MG: 1 INJECTION, SOLUTION INTRAVENOUS at 17:28

## 2022-01-01 RX ADMIN — INSULIN LISPRO 2 UNITS: 100 INJECTION, SOLUTION INTRAVENOUS; SUBCUTANEOUS at 18:34

## 2022-01-01 RX ADMIN — HYDRALAZINE HYDROCHLORIDE 5 MG: 20 INJECTION INTRAMUSCULAR; INTRAVENOUS at 23:48

## 2022-01-01 RX ADMIN — METOPROLOL TARTRATE 50 MG: 50 TABLET, FILM COATED ORAL at 21:20

## 2022-01-01 RX ADMIN — SENNOSIDES 8.6 MG: 8.6 TABLET, FILM COATED ORAL at 21:17

## 2022-01-01 RX ADMIN — POTASSIUM CHLORIDE 20 MEQ: 14.9 INJECTION, SOLUTION INTRAVENOUS at 16:47

## 2022-01-01 RX ADMIN — ACETAMINOPHEN 650 MG: 325 TABLET, FILM COATED ORAL at 00:51

## 2022-01-01 RX ADMIN — INSULIN LISPRO 1 UNITS: 100 INJECTION, SOLUTION INTRAVENOUS; SUBCUTANEOUS at 17:55

## 2022-01-01 RX ADMIN — MORPHINE SULFATE 2 MG: 2 INJECTION, SOLUTION INTRAMUSCULAR; INTRAVENOUS at 06:11

## 2022-01-01 RX ADMIN — SCOPALAMINE 1 PATCH: 1 PATCH, EXTENDED RELEASE TRANSDERMAL at 13:12

## 2022-01-01 RX ADMIN — ACETAMINOPHEN 650 MG: 325 TABLET, FILM COATED ORAL at 00:17

## 2022-01-01 RX ADMIN — ACETAMINOPHEN 650 MG: 325 TABLET, FILM COATED ORAL at 09:26

## 2022-01-01 RX ADMIN — AMLODIPINE BESYLATE 10 MG: 10 TABLET ORAL at 08:35

## 2022-01-01 RX ADMIN — ASPIRIN 81 MG CHEWABLE TABLET 81 MG: 81 TABLET CHEWABLE at 08:35

## 2022-01-01 RX ADMIN — MORPHINE SULFATE 4 MG: 4 INJECTION INTRAVENOUS at 14:41

## 2022-01-01 RX ADMIN — HEPARIN SODIUM 5000 UNITS: 5000 INJECTION INTRAVENOUS; SUBCUTANEOUS at 14:48

## 2022-01-01 RX ADMIN — HEPARIN SODIUM 5000 UNITS: 5000 INJECTION INTRAVENOUS; SUBCUTANEOUS at 21:16

## 2022-01-01 RX ADMIN — TAMSULOSIN HYDROCHLORIDE 0.4 MG: 0.4 CAPSULE ORAL at 16:48

## 2022-01-01 RX ADMIN — Medication 6 MG: at 21:15

## 2022-01-01 RX ADMIN — INSULIN LISPRO 3 UNITS: 100 INJECTION, SOLUTION INTRAVENOUS; SUBCUTANEOUS at 07:48

## 2022-01-01 RX ADMIN — HEPARIN SODIUM 5000 UNITS: 5000 INJECTION INTRAVENOUS; SUBCUTANEOUS at 05:20

## 2022-01-01 RX ADMIN — HYDRALAZINE HYDROCHLORIDE 5 MG: 20 INJECTION INTRAMUSCULAR; INTRAVENOUS at 14:15

## 2022-01-01 RX ADMIN — POTASSIUM CHLORIDE 20 MEQ: 14.9 INJECTION, SOLUTION INTRAVENOUS at 18:47

## 2022-01-01 RX ADMIN — SENNOSIDES 8.6 MG: 8.6 TABLET, FILM COATED ORAL at 21:36

## 2022-01-01 RX ADMIN — HEPARIN SODIUM 5000 UNITS: 5000 INJECTION INTRAVENOUS; SUBCUTANEOUS at 14:19

## 2022-01-01 RX ADMIN — INSULIN LISPRO 1 UNITS: 100 INJECTION, SOLUTION INTRAVENOUS; SUBCUTANEOUS at 18:12

## 2022-04-30 PROBLEM — S32.019A CLOSED FRACTURE OF FIRST LUMBAR VERTEBRA (HCC): Status: ACTIVE | Noted: 2022-01-01

## 2022-04-30 NOTE — ED PROVIDER NOTES
History  Chief Complaint   Patient presents with    Fall     Pt brought by EMS  Per report pt has fallen 3 times in the last couple hours  First two falls pt was placed back in bed by EMS  Per wife on the third fall pts knees buckled and he fell on floor, was laying there for a couple hours before she called 46     2 yo M lives at home with wife - PMH of cognitive decline, previous cspine fractures - presents today with wife for evaluation of muliple falls -  1st fall was last night returning from gross restore-patient's son had to carry him in to bed  Patient had a 2nd event where he was hanging onto the bed rail and wife lowered him to the floor-she was unable to get him up so he spent the night on the floor   - has acute L1 fracture on CT scan  Discussion with wife at bedside- pt is DNR level 3  Fall - Major  Location:  Home-patient has no complaints of pain  Context:  Fall from standing  Relieved by:  Nothing  Worsened by:  Nothing  Ineffective treatments:  Nothing  Associated symptoms: fatigue    Associated symptoms: no abdominal pain, no chest pain, no congestion, no diarrhea, no ear pain, no fever, no headaches, no loss of consciousness and no shortness of breath    Associated symptoms comment:  Per wife patient has been more fatigued over the past several days  Fatigue:     Severity:  Moderate    Timing:  Constant    Progression:  Worsening      Prior to Admission Medications   Prescriptions Last Dose Informant Patient Reported? Taking?    Melatonin 10 MG CAPS 4/29/2022 at Unknown time  Yes Yes   Sig: Take 10 mg by mouth   amLODIPine (NORVASC) 10 mg tablet 4/29/2022 at Unknown time  Yes Yes   Sig: Take 10 mg by mouth daily   aspirin 81 mg chewable tablet 4/29/2022 at Unknown time  Yes Yes   Sig: Chew 81 mg daily   atorvastatin (LIPITOR) 20 mg tablet 4/29/2022 at Unknown time  Yes Yes   Sig: Take 20 mg by mouth daily   irbesartan (AVAPRO) 150 mg tablet   Yes Yes   Sig: Take 150 mg by mouth daily at bedtime   lidocaine (LIDODERM) 5 %   No No   Sig: Apply 1 patch topically daily for 10 days Remove & Discard patch within 12 hours or as directed by MD   metoprolol tartrate (LOPRESSOR) 50 mg tablet 4/29/2022 at Unknown time  Yes Yes   Sig: Take 50 mg by mouth every 12 (twelve) hours     sitaGLIPtin (JANUVIA) 100 mg tablet 4/29/2022 at Unknown time  Yes Yes   Sig: Take 100 mg by mouth daily   tamsulosin (FLOMAX) 0 4 mg 4/29/2022 at Unknown time  Yes Yes   Sig: TAKE 1 CAPSULE BY MOUTH EVERY DAY 1/2 HR  FOLLOWING THE SAME MEAL EACH DAY      Facility-Administered Medications: None       Past Medical History:   Diagnosis Date    HLD (hyperlipidemia)     HTN (hypertension)     Prostate CA ([address] )     Skin cancer        Past Surgical History:   Procedure Laterality Date    APPENDECTOMY      CYSTOSCOPY      INGUINAL HERNIA REPAIR      THYROID SURGERY      possible cyst removal        History reviewed  No pertinent family history  I have reviewed and agree with the history as documented  E-Cigarette/Vaping     E-Cigarette/Vaping Substances     Social History     Tobacco Use    Smoking status: Never Smoker    Smokeless tobacco: Never Used   Substance Use Topics    Alcohol use: Not Currently    Drug use: Never       Review of Systems   Unable to perform ROS: Mental status change   Constitutional: Positive for fatigue  Negative for fever  HENT: Negative for congestion and ear pain  Respiratory: Negative for shortness of breath  Cardiovascular: Negative for chest pain  Gastrointestinal: Negative for abdominal pain and diarrhea  Neurological: Negative for loss of consciousness and headaches  Physical Exam  Physical Exam  Vitals and nursing note reviewed  Constitutional:       General: He is awake  He is not in acute distress  Appearance: He is well-developed and normal weight  Comments: Hard of hearing   HENT:      Head: Normocephalic and atraumatic        Nose: Congestion present  Mouth/Throat:      Mouth: Mucous membranes are moist    Eyes:      Conjunctiva/sclera: Conjunctivae normal       Comments: Cataracts   Cardiovascular:      Rate and Rhythm: Normal rate and regular rhythm  Heart sounds: No murmur heard  Pulmonary:      Effort: Pulmonary effort is normal  No respiratory distress  Breath sounds: Normal breath sounds  Abdominal:      General: Abdomen is flat  There is no distension  Palpations: Abdomen is soft  Tenderness: There is no abdominal tenderness  There is no right CVA tenderness, left CVA tenderness, guarding or rebound  Musculoskeletal:         General: No tenderness  Cervical back: Neck supple  No rigidity or tenderness  Right lower leg: Edema present  Left lower leg: Edema present  Skin:     General: Skin is warm and dry  Capillary Refill: Capillary refill takes less than 2 seconds  Neurological:      Mental Status: He is disoriented  Cranial Nerves: No cranial nerve deficit  Sensory: No sensory deficit  Comments: Patient moves all 4 extremities   Psychiatric:         Behavior: Behavior normal  Behavior is cooperative           Vital Signs  ED Triage Vitals [04/30/22 0917]   Temperature Pulse Respirations Blood Pressure SpO2   98 1 °F (36 7 °C) 91 20 (!) 202/97 95 %      Temp Source Heart Rate Source Patient Position - Orthostatic VS BP Location FiO2 (%)   Oral Monitor Lying Right arm --      Pain Score       No Pain           Vitals:    04/30/22 1314 04/30/22 1401 04/30/22 1447 04/30/22 1519   BP: (!) 228/105 (!) 203/88 (!) 198/95 (!) 205/95   Pulse: 81 79 85    Patient Position - Orthostatic VS: Lying Sitting Lying          Visual Acuity      ED Medications  Medications   acetaminophen (TYLENOL) tablet 650 mg (has no administration in time range)   oxyCODONE-acetaminophen (PERCOCET) 5-325 mg per tablet 1 tablet (has no administration in time range)   morphine injection 2 mg (has no administration in time range)   senna (SENOKOT) tablet 8 6 mg (has no administration in time range)   insulin lispro (HumaLOG) 100 units/mL subcutaneous injection 1-6 Units (1 Units Subcutaneous Not Given 4/30/22 1519)   insulin lispro (HumaLOG) 100 units/mL subcutaneous injection 1-5 Units (has no administration in time range)   labetalol (NORMODYNE) injection 10 mg (has no administration in time range)   hydrALAZINE (APRESOLINE) injection 5 mg (5 mg Intravenous Given 4/30/22 1415)   piperacillin-tazobactam (ZOSYN) IVPB 3 375 g (0 g Intravenous Stopped 4/30/22 1140)   lactated ringers bolus 1,000 mL (0 mL Intravenous Stopped 4/30/22 1400)   labetalol (NORMODYNE) injection 10 mg (10 mg Intravenous Given 4/30/22 1158)       Diagnostic Studies  Results Reviewed     Procedure Component Value Units Date/Time    Hemoglobin A1c w/EAG Estimation (Orders if not completed within the last 90 days) [[id number]]     Lab Status: No result Specimen: Blood     HS Troponin I 2hr [[id number]]  (Abnormal) Collected: 04/30/22 1315    Lab Status: Final result Specimen: Blood from Arm, Left Updated: 04/30/22 1348     hs TnI 2hr 211 ng/L      Delta 2hr hsTnI 32 ng/L     COVID/FLU/RSV [[id number]]  (Normal) Collected: 04/30/22 1113    Lab Status: Final result Specimen: Nares from Nose Updated: 04/30/22 1200     SARS-CoV-2 Negative     INFLUENZA A PCR Negative     INFLUENZA B PCR Negative     RSV PCR Negative    Narrative:      FOR PEDIATRIC PATIENTS - copy/paste COVID Guidelines URL to browser: [email or web address]    SARS-CoV-2 assay is a Nucleic Acid Amplification assay intended for the  qualitative detection of nucleic acid from SARS-CoV-2 in nasopharyngeal  swabs  Results are for the presumptive identification of SARS-CoV-2 RNA      Positive results are indicative of infection with SARS-CoV-2, the virus  causing COVID-19, but do not rule out bacterial infection or co-infection  with other viruses  Laboratories within the [place] and its  territories are required to report all positive results to the appropriate  public health authorities  Negative results do not preclude SARS-CoV-2  infection and should not be used as the sole basis for treatment or other  patient management decisions  Negative results must be combined with  clinical observations, patient history, and epidemiological information  This test has not been FDA cleared or approved  This test has been authorized by FDA under an Emergency Use Authorization  (EUA)  This test is only authorized for the duration of time the  declaration that circumstances exist justifying the authorization of the  emergency use of an in vitro diagnostic tests for detection of SARS-CoV-2  virus and/or diagnosis of COVID-19 infection under section 564(b)(1) of  the Act, 21 U  S C  009ARW-0(L)(8), unless the authorization is terminated  or revoked sooner  The test has been validated but independent review by FDA  and CLIA is pending  Test performed using [employer] Gene[employer]pert: This RT-PCR assay targets N2,  a region unique to SARS-CoV-2  A conserved region in the E-gene was chosen  for pan-Sarbecovirus detection which includes SARS-CoV-2      CKMB [[id number]]  (Normal) Collected: 04/30/22 0932    Lab Status: Final result Specimen: Blood from Arm, Right Updated: 04/30/22 1156     CK-MB Index 1 7 %      CK-MB 4 4 ng/mL     Urine Microscopic [[id number]]  (Normal) Collected: 04/30/22 1112    Lab Status: Final result Specimen: Urine, Clean Catch Updated: 04/30/22 1136     RBC, UA 2-4 /hpf      WBC, UA None Seen /hpf      Epithelial Cells None Seen /hpf      Bacteria, UA None Seen /hpf     HS Troponin I 4hr [[id number]]     Lab Status: No result Specimen: Blood     UA w Reflex to Microscopic w Reflex to Culture [[id number]]  (Abnormal) Collected: 04/30/22 1112    Lab Status: Final result Specimen: Urine, Clean Catch Updated: 04/30/22 1123     Color, UA Light Yellow     Clarity, UA Clear     Specific Gravity, UA 1 025     pH, UA 6 0     Leukocytes, UA Negative     Nitrite, UA Negative     Protein,  (2+) mg/dl      Glucose, UA Negative mg/dl      Ketones, UA 15 (1+) mg/dl      Urobilinogen, UA 0 2 E U /dl      Bilirubin, UA Negative     Blood, UA Small    CK (with reflex to MB) [[id number]]  (Normal) Collected: 04/30/22 0932    Lab Status: Final result Specimen: Blood from Arm, Right Updated: 04/30/22 1103     Total  U/L     Blood culture #2 [[id number]] Collected: 04/30/22 1024    Lab Status: In process Specimen: Blood from Arm, Right Updated: 04/30/22 1041    Lactic acid [[id number]]  (Normal) Collected: 04/30/22 0940    Lab Status: Final result Specimen: Blood from Arm, Left Updated: 04/30/22 1011     LACTIC ACID 1 0 mmol/L     Narrative:      Result may be elevated if tourniquet was used during collection  Procalcitonin [[id number]]  (Normal) Collected: 04/30/22 0932    Lab Status: Final result Specimen: Blood from Arm, Right Updated: 04/30/22 1006     Procalcitonin 0 10 ng/ml     HS Troponin 0hr (reflex protocol) [[id number]]  (Abnormal) Collected: 04/30/22 0932    Lab Status: Final result Specimen: Blood from Arm, Right Updated: 04/30/22 1003     hs TnI 0hr 179 ng/L     TSH [[id number]]  (Normal) Collected: 04/30/22 0932    Lab Status: Final result Specimen: Blood from Arm, Right Updated: 04/30/22 1002     TSH 3RD GENERATON 2 027 uIU/mL     Narrative:      Patients undergoing fluorescein dye angiography may retain small amounts of fluorescein in the body for 48-72 hours post procedure  Samples containing fluorescein can produce falsely depressed TSH values  If the patient had this procedure,a specimen should be resubmitted post fluorescein clearance        Comprehensive metabolic panel [[id number]]  (Abnormal) Collected: 04/30/22 0932    Lab Status: Final result Specimen: Blood from Arm, Right Updated: 04/30/22 0954     Sodium 141 mmol/L      Potassium 3 6 mmol/L      Chloride 105 mmol/L      CO2 26 mmol/L      ANION GAP 10 mmol/L      BUN 23 mg/dL      Creatinine 1 37 mg/dL      Glucose 173 mg/dL      Calcium 10 0 mg/dL      AST 21 U/L      ALT 23 U/L      Alkaline Phosphatase 89 U/L      Total Protein 7 0 g/dL      Albumin 3 6 g/dL      Total Bilirubin 0 66 mg/dL      eGFR 44 ml/min/1 73sq m     Narrative:      [place] guidelines for Chronic Kidney Disease (CKD):     Stage 1 with normal or high GFR (GFR > 90 mL/min/1 73 square meters)    Stage 2 Mild CKD (GFR = 60-89 mL/min/1 73 square meters)    Stage 3A Moderate CKD (GFR = 45-59 mL/min/1 73 square meters)    Stage 3B Moderate CKD (GFR = 30-44 mL/min/1 73 square meters)    Stage 4 Severe CKD (GFR = 15-29 mL/min/1 73 square meters)    Stage 5 End Stage CKD (GFR <15 mL/min/1 73 square meters)  Note: GFR calculation is accurate only with a steady state creatinine    Protime-INR [[id number]]  (Normal) Collected: 04/30/22 0932    Lab Status: Final result Specimen: Blood from Arm, Right Updated: 04/30/22 0950     Protime 12 6 seconds      INR 0 96    APTT [[id number]]  (Normal) Collected: 04/30/22 0932    Lab Status: Final result Specimen: Blood from Arm, Right Updated: 04/30/22 0950     PTT 35 seconds     Blood culture #1 [[id number]] Collected: 04/30/22 0940    Lab Status:  In process Specimen: Blood from Arm, Left Updated: 04/30/22 0947    CBC and differential [[id number]]  (Abnormal) Collected: 04/30/22 0932    Lab Status: Final result Specimen: Blood from Arm, Right Updated: 04/30/22 0938     WBC 10 40 Thousand/uL      RBC 4 24 Million/uL      Hemoglobin 12 6 g/dL      Hematocrit 37 7 %      MCV 89 fL      MCH 29 7 pg      MCHC 33 4 g/dL      RDW 12 8 %      MPV 9 7 fL      Platelets 360 Thousands/uL      nRBC 0 /100 WBCs      Neutrophils Relative 78 %      Immat GRANS % 0 %      Lymphocytes Relative 11 %      Monocytes Relative 10 %      Eosinophils Relative 0 %      Basophils Relative 1 %      Neutrophils Absolute 8 08 Thousands/µL      Immature Grans Absolute 0 03 Thousand/uL      Lymphocytes Absolute 1 16 Thousands/µL      Monocytes Absolute 1 04 Thousand/µL      Eosinophils Absolute 0 04 Thousand/µL      Basophils Absolute 0 05 Thousands/µL                  XR chest 1 view   ED Interpretation by Baldo Kramer PA-C (04/30 1125)   Multifocal infiltrates - consider aspiration vs edema vs covid      CT spine cervical wo contrast   Final Result by Berta Cedillo MD (04/30 1116)      No cervical spine fracture or traumatic malalignment  Workstation performed: [id number]         CT abdomen pelvis wo contrast   Final Result by Berta Cedillo MD (04/30 1131)      1  Acute L1 fracture  2   1 cm right pericardial effusion  3   Multiple pancreatic cysts, the dominant one is larger than previous  A nonemergent pancreatic protocol CT or MRI recommended  The study was marked in [facility] for immediate notification  Workstation performed: [id number]         CT head without contrast   Final Result by Berta Cedillo MD (04/30 1106)      No acute intracranial abnormality  Microangiopathic changes  Worsening right maxillary sinus disease                    Workstation performed: [id number]                    Procedures  ECG 12 Lead Documentation Only    Date/Time: 4/30/2022 3:19 PM  Performed by: Baldo Kramer PA-C  Authorized by: Baldo Kramer PA-C     Patient location:  ED  Interpretation:     Interpretation: abnormal    Rate:     ECG rate:  89    ECG rate assessment: normal    Rhythm:     Rhythm: sinus rhythm    Comments:      First-degree AV block  PVCs             ED Course  ED Course as of 04/30/22 1520   Sat Apr 30, 2022   1017 HS Troponin 0hr (reflex protocol)(!)   1017 Creatinine(!): 1 37   1017 eGFR: 44   1040 Blood Pressure(!): 202/97   1105 Procalcitonin   1105 Lactic acid   1128 pH, UA: 6 0   1129 Leukocytes, UA: Negative   1129 Blood, UA(!): Small   1129 Bilirubin, UA: Negative                                             MDM  Number of Diagnoses or Management Options  Closed fracture of first lumbar vertebra, unspecified fracture morphology, initial encounter [facility]): new and requires workup  Fall, initial encounter: new and requires workup     Amount and/or Complexity of Data Reviewed  Clinical lab tests: ordered and reviewed  Tests in the radiology section of CPT®: ordered  Tests in the medicine section of CPT®: ordered and reviewed  Decide to obtain previous medical records or to obtain history from someone other than the patient: yes  Review and summarize past medical records: yes  Discuss the patient with other providers: yes  Independent visualization of images, tracings, or specimens: yes    Risk of Complications, Morbidity, and/or Mortality  Presenting problems: high  Diagnostic procedures: high  Management options: high    Patient Progress  Patient progress: stable      Disposition  Final diagnoses:   Fall, initial encounter   Closed fracture of first lumbar vertebra, unspecified fracture morphology, initial encounter [facility])     Time reflects when diagnosis was documented in both MDM as applicable and the Disposition within this note     Time User Action Codes Description Comment    4/30/2022 12:13 PM Volodymyr Sin Add [place] Bullion  NFAG] Fall, initial encounter     4/30/2022  3:20 PM Lavinia Fraser Add [S32 019A] Closed fracture of first lumbar vertebra, unspecified fracture morphology, [facility]     4/30/2022  3:20 PM Volodymyr Sin Modify [place] Bullion  SEOJ] Fall, initial encounter     4/30/2022  3:20 PM Volodymyr Sin Modify [S32 019A] Closed fracture of first lumbar vertebra, unspecified fracture morphology, initial encounter [facility])       ED Disposition     ED Disposition Condition Date/Time Comment    Admit Stable Sat Apr 30, 2022 12:13 PM Case was discussed with DAYANNA and the patient's admission status was agreed to be Admission Status: inpatient status to the service of Dr Marcelo Shell   Follow-up Information    None         Patient's Medications   Discharge Prescriptions    No medications on file       No discharge procedures on file      PDMP Review       Value Time User    PDMP Reviewed  Yes 8/12/2021  2:12 PM Alonso Levine PA-C          ED Provider  Electronically Signed by           Marvin Ash PA-C  04/30/22 3322

## 2022-04-30 NOTE — H&P
Lina 45  H&P- Amber 4/12/1929, 80 y o  male MRN: [id number]  Unit/Bed#: ED 07 Encounter: [id number]  Primary Care Provider: Nelson Florian DO   Date and time admitted to hospital: 4/30/2022  9:14 AM    Closed fracture of first lumbar vertebra [facility])  Assessment & Plan  l1 fracture on ct  - consult to ns placed  - brace  - pain control ordered    Diabetes mellitus type 2  Assessment & Plan  Lab Results   Component Value Date    HGBA1C 6 6 (H) 12/12/2021       No results for input(s): POCGLU in the last 72 hours  Blood Sugar Average: Last 72 hrs:   start ssi, repeat a1c    Hyperlipidemia  Assessment & Plan  statin    Essential hypertension  Assessment & Plan  bp grossly elevated, likely in part due to pain  - restart home bp medications  - prn hydralazine/labetalol  - can try pain control to help bp  - if remains elevated may need gtt    VTE Pharmacologic Prophylaxis:   High Risk (Score >/= 5) - Pharmacological DVT Prophylaxis Ordered: heparin  Sequential Compression Devices Ordered  Code Status: Level 3 - DNAR and DNI   Discussion with family: Updated  (wife) at bedside  Anticipated Length of Stay: Patient will be admitted on an inpatient basis with an anticipated length of stay of greater than 2 midnights secondary to back pain   Total Time for Visit, including Counseling / Coordination of Care: 60 minutes Greater than 50% of this total time spent on direct patient counseling and coordination of care  Chief Complaint: falls    History of Present Illness:  Amber is a 80 y o  male with a PMH of diabetes, cervical neck fracture, hypertension, prostate cancer who presents with multiple falls  Patient's wife provides most of the information, reports that he was in his normal state of health, working with therapy since his last hospitalization, however yesterday he had 3 falls    The 1st time he fell while trying to get up from the toilet and she called her son to help him back to bed, the 2nd time she called emergency who help get patient up in the 3rd time he was brought to the hospital   In the ER patient was found to have significantly elevated blood pressure as well as the L1 fracture and he was admitted to Medicine  Unfortunately patient's mental status is questionable at this time however his wife reports that over the last 6 months a year he has spells where she is concerned about his mental status  Review of Systems:  Review of Systems   Constitutional: Negative for activity change  Respiratory: Negative for shortness of breath and wheezing  Cardiovascular: Negative for chest pain and leg swelling  Gastrointestinal: Negative for abdominal pain and vomiting  Genitourinary: Positive for frequency  Musculoskeletal: Positive for back pain  Neurological: Negative for dizziness and light-headedness  Psychiatric/Behavioral: Negative for agitation  Past Medical and Surgical History:   Past Medical History:   Diagnosis Date    HLD (hyperlipidemia)     HTN (hypertension)     Prostate CA ([address] )     Skin cancer        Past Surgical History:   Procedure Laterality Date    APPENDECTOMY      CYSTOSCOPY      INGUINAL HERNIA REPAIR      THYROID SURGERY      possible cyst removal        Meds/Allergies:  Prior to Admission medications    Medication Sig Start Date End Date Taking?  Authorizing Provider   amLODIPine (NORVASC) 10 mg tablet Take 10 mg by mouth daily 4/26/21  Yes Historical Provider, MD   aspirin 81 mg chewable tablet Chew 81 mg daily   Yes Historical Provider, MD   atorvastatin (LIPITOR) 20 mg tablet Take 20 mg by mouth daily 3/31/21  Yes Historical Provider, MD   irbesartan (AVAPRO) 150 mg tablet Take 150 mg by mouth daily at bedtime   Yes Historical Provider, MD   Melatonin 10 MG CAPS Take 10 mg by mouth   Yes Historical Provider, MD   metoprolol tartrate (LOPRESSOR) 50 mg tablet Take 50 mg by mouth every 12 (twelve) hours 3/6/21  Yes Historical Provider, MD   sitaGLIPtin (JANUVIA) 100 mg tablet Take 100 mg by mouth daily   Yes Historical Provider, MD   tamsulosin (FLOMAX) 0 4 mg TAKE 1 CAPSULE BY MOUTH EVERY DAY 1/2 HR  FOLLOWING THE SAME MEAL EACH DAY 3/24/21  Yes Historical Provider, MD   losartan (COZAAR) 50 mg tablet Take 50 mg by mouth daily  4/30/22 Yes Historical Provider, MD   lidocaine (LIDODERM) 5 % Apply 1 patch topically daily for 10 days Remove & Discard patch within 12 hours or as directed by MD 12/18/21 12/28/21  Elin Anderson DO   acetaminophen (TYLENOL) 325 mg tablet Take 3 tablets (975 mg total) by mouth every 8 (eight) hours 8/12/21 4/30/22  Pepito Lima PA-C   furosemide (LASIX) 20 mg tablet Take 20 mg by mouth daily  Patient not taking: Reported on 4/30/2022 4/30/22  Historical Provider, MD   linaGLIPtin 5 MG TABS Take 5 mg by mouth daily  4/30/22  Historical Provider, MD   senna (SENOKOT) 8 6 mg Take 2 tablets (17 2 mg total) by mouth daily  Patient not taking: Reported on 4/30/2022 8/13/21 4/30/22  Leia Pitts PA-C     I have reviewed home medications with patient personally      Allergies: No Known Allergies    Social History:  Marital Status: /Civil Union   Occupation:  Former , retired  Patient Pre-hospital Living Situation: Home  Patient Pre-hospital Level of Mobility: walks with walker  Patient Pre-hospital Diet Restrictions:  Diabetic  Substance Use History:   Social History     Substance and Sexual Activity   Alcohol Use Not Currently     Social History     Tobacco Use   Smoking Status Never Smoker   Smokeless Tobacco Never Used     Social History     Substance and Sexual Activity   Drug Use Never       Family History:  Cancer    Physical Exam:     Vitals:   Blood Pressure: (!) 203/88 (04/30/22 1401)  Pulse: 79 (04/30/22 1401)  Temperature: 98 1 °F (36 7 °C) (04/30/22 0917)  Temp Source: Oral (04/30/22 0917)  Respirations: 18 (04/30/22 1401)  SpO2: 100 % (04/30/22 1401)    Physical Exam  Constitutional:       General: He is not in acute distress  Appearance: He is not ill-appearing  HENT:      Head: Normocephalic and atraumatic  Eyes:      Extraocular Movements: Extraocular movements intact  Conjunctiva/sclera: Conjunctivae normal    Cardiovascular:      Rate and Rhythm: Normal rate and regular rhythm  Pulmonary:      Effort: Pulmonary effort is normal       Breath sounds: Normal breath sounds  Abdominal:      General: Abdomen is flat  Bowel sounds are normal  There is no distension  Palpations: Abdomen is soft  There is no mass  Musculoskeletal:      Cervical back: Normal range of motion and neck supple  Skin:     General: Skin is warm and dry  Coloration: Skin is not jaundiced or pale  Neurological:      Mental Status: Mental status is at baseline     Psychiatric:         Mood and Affect: Mood normal          Behavior: Behavior normal          Additional Data:     Lab Results:  Results from last 7 days   Lab Units 04/30/22  0932   WBC Thousand/uL 10 40*   HEMOGLOBIN g/dL 12 6   HEMATOCRIT % 37 7   PLATELETS Thousands/uL 243   NEUTROS PCT % 78*   LYMPHS PCT % 11*   MONOS PCT % 10   EOS PCT % 0     Results from last 7 days   Lab Units 04/30/22  0932   SODIUM mmol/L 141   POTASSIUM mmol/L 3 6   CHLORIDE mmol/L 105   CO2 mmol/L 26   BUN mg/dL 23   CREATININE mg/dL 1 37*   ANION GAP mmol/L 10   CALCIUM mg/dL 10 0   ALBUMIN g/dL 3 6   TOTAL BILIRUBIN mg/dL 0 66   ALK PHOS U/L 89   ALT U/L 23   AST U/L 21   GLUCOSE RANDOM mg/dL 173*     Results from last 7 days   Lab Units 04/30/22  0932   INR  0 96             Results from last 7 days   Lab Units 04/30/22  0940 04/30/22  0932   LACTIC ACID mmol/L 1 0  --    PROCALCITONIN ng/ml  --  0 10       Imaging:   XR chest 1 view   ED Interpretation by Duarte Gonzalez PA-C (04/30 1125)   Multifocal infiltrates - consider aspiration vs edema vs covid      CT spine cervical wo contrast   Final Result by Kyree Sarkar MD (04/30 1116)      No cervical spine fracture or traumatic malalignment  Workstation performed: [id number]         CT abdomen pelvis wo contrast   Final Result by Kyree Sarkar MD (04/30 1131)      1  Acute L1 fracture  2   1 cm right pericardial effusion  3   Multiple pancreatic cysts, the dominant one is larger than previous  A nonemergent pancreatic protocol CT or MRI recommended  The study was marked in [facility] for immediate notification  Workstation performed: [id number]         CT head without contrast   Final Result by Kyree Sarkar MD (04/30 1106)      No acute intracranial abnormality  Microangiopathic changes  Worsening right maxillary sinus disease  Workstation performed: [id number]             EKG and Other Studies Reviewed on Admission:   · EKG:    ** Please Note: This note has been constructed using a voice recognition system   **

## 2022-04-30 NOTE — PLAN OF CARE
Problem: Potential for Falls  Goal: Patient will remain free of falls  Description: INTERVENTIONS:  - Educate patient/family on patient safety including physical limitations  - Instruct patient to call for assistance with activity   - Consult OT/PT to assist with strengthening/mobility   - Keep Call bell within reach  - Keep bed low and locked with side rails adjusted as appropriate  - Keep care items and personal belongings within reach  - Initiate and maintain comfort rounds  - Make Fall Risk Sign visible to staff  - Offer Toileting every 2 Hours, in advance of need  - Initiate/Maintain bed alarm  - Obtain necessary fall risk management equipment: yellow bracelet  - Apply yellow socks and bracelet for high fall risk patients  - Consider moving patient to room near nurses station  Outcome: Progressing     Problem: MOBILITY - ADULT  Goal: Maintain or return to baseline ADL function  Description: INTERVENTIONS:  -  Assess patient's ability to carry out ADLs; assess patient's baseline for ADL function and identify physical deficits which impact ability to perform ADLs (bathing, care of mouth/teeth, toileting, grooming, dressing, etc )  - Assess/evaluate cause of self-care deficits   - Assess range of motion  - Assess patient's mobility; develop plan if impaired  - Assess patient's need for assistive devices and provide as appropriate  - Encourage maximum independence but intervene and supervise when necessary  - Involve family in performance of ADLs  - Assess for home care needs following discharge   - Consider OT consult to assist with ADL evaluation and planning for discharge  - Provide patient education as appropriate  Outcome: Progressing  Goal: Maintains/Returns to pre admission functional level  Description: INTERVENTIONS:  - Perform BMAT or MOVE assessment daily    - Set and communicate daily mobility goal to care team and patient/family/caregiver     - Collaborate with rehabilitation services on mobility goals if consulted  - Perform Range of Motion 3 times a day  - Reposition patient every 2 hours    - Dangle patient 3 times a day  - Stand patient 3 times a day  - Ambulate patient 3 times a day  - Out of bed to chair 3 times a day   - Out of bed for meals 3 times a day  - Out of bed for toileting  - Record patient progress and toleration of activity level   Outcome: Progressing

## 2022-04-30 NOTE — PROGRESS NOTES
Neurosurgery curbside consult recommends brace, and outpatient follow-up in 6 weeks with standing L-spine films

## 2022-04-30 NOTE — PLAN OF CARE
Problem: Potential for Falls  Goal: Patient will remain free of falls  Description: INTERVENTIONS:  - Educate patient/family on patient safety including physical limitations  - Instruct patient to call for assistance with activity   - Consult OT/PT to assist with strengthening/mobility   - Keep Call bell within reach  - Keep bed low and locked with side rails adjusted as appropriate  - Keep care items and personal belongings within reach  - Initiate and maintain comfort rounds  - Make Fall Risk Sign visible to staff  - Offer Toileting every x Hours, in advance of need  - Initiate/Maintain xalarm  - Obtain necessary fall risk management equipment: x  - Apply yellow socks and bracelet for high fall risk patients  - Consider moving patient to room near nurses station  Outcome: Progressing     Problem: MOBILITY - ADULT  Goal: Maintain or return to baseline ADL function  Description: INTERVENTIONS:  -  Assess patient's ability to carry out ADLs; assess patient's baseline for ADL function and identify physical deficits which impact ability to perform ADLs (bathing, care of mouth/teeth, toileting, grooming, dressing, etc )  - Assess/evaluate cause of self-care deficits   - Assess range of motion  - Assess patient's mobility; develop plan if impaired  - Assess patient's need for assistive devices and provide as appropriate  - Encourage maximum independence but intervene and supervise when necessary  - Involve family in performance of ADLs  - Assess for home care needs following discharge   - Consider OT consult to assist with ADL evaluation and planning for discharge  - Provide patient education as appropriate  Outcome: Progressing  Goal: Maintains/Returns to pre admission functional level  Description: INTERVENTIONS:  - Perform BMAT or MOVE assessment daily    - Set and communicate daily mobility goal to care team and patient/family/caregiver     - Collaborate with rehabilitation services on mobility goals if consulted  - Perform Range of Motion x times a day  - Reposition patient every x hours    - Dangle patient x times a day  - Stand patient x times a day  - Ambulate patient x times a day  - Out of bed to chair x times a day   - Out of bed for meals x times a day  - Out of bed for toileting  - Record patient progress and toleration of activity level   Outcome: Progressing     Problem: Prexisting or High Potential for Compromised Skin Integrity  Goal: Skin integrity is maintained or improved  Description: INTERVENTIONS:  - Identify patients at risk for skin breakdown  - Assess and monitor skin integrity  - Assess and monitor nutrition and hydration status  - Monitor labs   - Assess for incontinence   - Turn and reposition patient  - Assist with mobility/ambulation  - Relieve pressure over bony prominences  - Avoid friction and shearing  - Provide appropriate hygiene as needed including keeping skin clean and dry  - Evaluate need for skin moisturizer/barrier cream  - Collaborate with interdisciplinary team   - Patient/family teaching  - Consider wound care consult   Outcome: Progressing

## 2022-04-30 NOTE — ASSESSMENT & PLAN NOTE
bp grossly elevated, likely in part due to pain  - restart home bp medications  - prn hydralazine/labetalol  - can try pain control to help bp  - if remains elevated may need gtt

## 2022-04-30 NOTE — ASSESSMENT & PLAN NOTE
Lab Results   Component Value Date    HGBA1C 6 6 (H) 12/12/2021       No results for input(s): POCGLU in the last 72 hours      Blood Sugar Average: Last 72 hrs:   start ssi, repeat a1c

## 2022-05-01 PROBLEM — R77.8 ELEVATED TROPONIN: Status: ACTIVE | Noted: 2022-01-01

## 2022-05-01 PROBLEM — R41.82 ALTERED MENTAL STATUS: Status: ACTIVE | Noted: 2022-01-01

## 2022-05-01 PROBLEM — I5A NON-ISCHEMIC MYOCARDIAL INJURY (NON-TRAUMATIC): Status: ACTIVE | Noted: 2022-01-01

## 2022-05-01 NOTE — PLAN OF CARE
Problem: Potential for Falls  Goal: Patient will remain free of falls  Description: INTERVENTIONS:  - Educate patient/family on patient safety including physical limitations  - Instruct patient to call for assistance with activity   - Consult OT/PT to assist with strengthening/mobility   - Keep Call bell within reach  - Keep bed low and locked with side rails adjusted as appropriate  - Keep care items and personal belongings within reach  - Initiate and maintain comfort rounds  - Make Fall Risk Sign visible to staff  - Offer Toileting every 2 Hours, in advance of need  - Initiate/Maintain bed alarm  - Obtain necessary fall risk management equipment: yes  - Apply yellow socks and bracelet for high fall risk patients  - Consider moving patient to room near nurses station  Outcome: Progressing     Problem: MOBILITY - ADULT  Goal: Maintain or return to baseline ADL function  Description: INTERVENTIONS:  -  Assess patient's ability to carry out ADLs; assess patient's baseline for ADL function and identify physical deficits which impact ability to perform ADLs (bathing, care of mouth/teeth, toileting, grooming, dressing, etc )  - Assess/evaluate cause of self-care deficits   - Assess range of motion  - Assess patient's mobility; develop plan if impaired  - Assess patient's need for assistive devices and provide as appropriate  - Encourage maximum independence but intervene and supervise when necessary  - Involve family in performance of ADLs  - Assess for home care needs following discharge   - Consider OT consult to assist with ADL evaluation and planning for discharge  - Provide patient education as appropriate  Outcome: Progressing  Goal: Maintains/Returns to pre admission functional level  Description: INTERVENTIONS:  - Perform BMAT or MOVE assessment daily    - Set and communicate daily mobility goal to care team and patient/family/caregiver     - Collaborate with rehabilitation services on mobility goals if consulted  - Perform Range of Motion 3 times a day  - Reposition patient every 2 hours    - Dangle patient 2 times a day  - Stand patient 1 times a day  - Ambulate patient 1 times a day  - Out of bed to chair 1 times a day   - Out of bed for meals 2 times a day  - Out of bed for toileting  - Record patient progress and toleration of activity level   Outcome: Progressing     Problem: Prexisting or High Potential for Compromised Skin Integrity  Goal: Skin integrity is maintained or improved  Description: INTERVENTIONS:  - Identify patients at risk for skin breakdown  - Assess and monitor skin integrity  - Assess and monitor nutrition and hydration status  - Monitor labs   - Assess for incontinence   - Turn and reposition patient  - Assist with mobility/ambulation  - Relieve pressure over bony prominences  - Avoid friction and shearing  - Provide appropriate hygiene as needed including keeping skin clean and dry  - Evaluate need for skin moisturizer/barrier cream  - Collaborate with interdisciplinary team   - Patient/family teaching  - Consider wound care consult   Outcome: Progressing     Problem: DISCHARGE PLANNING  Goal: Discharge to home or other facility with appropriate resources  Description: INTERVENTIONS:  - Identify barriers to discharge w/patient and caregiver  - Arrange for needed discharge resources and transportation as appropriate  - Identify discharge learning needs (meds, wound care, etc )  - Arrange for interpretive services to assist at discharge as needed  - Refer to Case Management Department for coordinating discharge planning if the patient needs post-hospital services based on physician/advanced practitioner order or complex needs related to functional status, cognitive ability, or social support system  Outcome: Progressing     Problem: Knowledge Deficit  Goal: Patient/family/caregiver demonstrates understanding of disease process, treatment plan, medications, and discharge instructions  Description: Complete learning assessment and assess knowledge base  Interventions:  - Provide teaching at level of understanding  - Provide teaching via preferred learning methods  Outcome: Progressing     Problem: CONFUSION/THOUGHT DISTURBANCE  Goal: Thought disturbances (confusion, delirium, depression, dementia or psychosis) are managed to maintain or return to baseline mental status and functional level  Description: INTERVENTIONS:  - Assess for possible contributors to  thought disturbance, including but not limited to medications, infection, impaired vision or hearing, underlying metabolic abnormalities, dehydration, respiratory compromise,  psychiatric diagnoses and notify attending PHYSICAN/AP  - Monitor and intervene to maintain adequate nutrition, hydration, elimination, sleep and activity  - Decrease environmental stimuli, including noise as appropriate  - Provide frequent contacts to provide refocusing, direction and reassurance as needed  Approach patient calmly with eye contact and at their level    - [place] high risk fall precautions, aspiration precautions and other safety measures, as indicated  - If delirium suspected, notify physician/AP of change in condition and request immediate in-person evaluation  - Pursue consults as appropriate including Geriatric (campus dependent), OT for cognitive evaluation/activity planning, psychiatric, pastoral care, etc   Outcome: Progressing

## 2022-05-01 NOTE — ASSESSMENT & PLAN NOTE
Falls at home in the bathroom no LOC per family report  L1 fracture on ct  - consult to ns placed, appreciated recs they will fu in 6 weeks op  - TLSO brace  - pain control ordered but patient hasn't been utilizing it much  He is altered, unable to ask for pain meds    I will schedule some of them   - Pt/OT diana

## 2022-05-01 NOTE — PROGRESS NOTES
[address] [address]  Progress Note Romero Oliveira 4/12/1929, 80 y o  male MRN: [id number]  Unit/Bed#: [address] Encounter: [id number]  Primary Care Provider: Billie Mart DO   Date and time admitted to hospital: 4/30/2022  9:14 AM    * Closed fracture of first lumbar vertebra [facility])  Assessment & Plan  Falls at home in the bathroom no LOC per family report  L1 fracture on ct  - consult to ns placed, appreciated recs they will fu in 6 weeks op  - TLSO brace  - pain control ordered but patient hasn't been utilizing it much  He is altered, unable to ask for pain meds  I will schedule some of them   - Pt/OT diana    Essential hypertension  Assessment & Plan  bp elevated, likely in part due to pain  - restart home bp medications  - prn hydralazine/labetalol      Altered mental status  Assessment & Plan  · Wife reports 6 months history of declining mental status/ memory which waxes and wanes and he has spells" of confusion, lethargy, weakness which usually resolves in 1 day  He has been worked up multiple times for this  It sounds like a metabolic encephalopathy, dementia progression according to the way the wife describes it  · Likley underlying dementia which is never formally diagnosed  and now with acute pain, HTN and hospitalization he is delirious  · Restraints placed last night, I asked the nurse to please discontinue them this morning  · Will start seroquel 25 mg qhs   · Psych consult    Elevated troponin  Assessment & Plan  EKG is his baseline , nonischemic  Trop trended 179 - 211 - 226   Random trop now is 80  Suspect this is elevated in HTN urgency  No need for heparin gtt    Diabetes mellitus type 2  Assessment & Plan  Lab Results   Component Value Date    HGBA1C 7 1 (H) 04/30/2022       Recent Labs     04/30/22  1752 04/30/22  2057 05/01/22  0716 05/01/22  0832   POCGLU 159* 121 155* 149*       Blood Sugar Average: Last 72 hrs:  (P) 146     SSI      VTE Pharmacologic Prophylaxis: VTE Score: 3 Moderate Risk (Score 3-4) - Pharmacological DVT Prophylaxis Ordered: heparin  Patient Centered Rounds: I performed bedside rounds with nursing staff today  Discussions with Specialists or Other Care Team Provider: none    Education and Discussions with Family / Patient: Updated  (wife) via phone  Time Spent for Care: 30 minutes  More than 50% of total time spent on counseling and coordination of care as described above  Current Length of Stay: 1 day(s)  Current Patient Status: Inpatient   Certification Statement: The patient will continue to require additional inpatient hospital stay due to iv abx, monitoring mentation, pt ot evals, management of BP & pain  Discharge Plan: Anticipate discharge in 24-48 hrs to discharge location to be determined pending rehab evaluations  Code Status: Level 3 - DNAR and DNI    Subjective:   Hallucinations, agitation last night after being admitted which the wife told me about , he was restrained when I saw him today and I asked the RN to please discontinue because he is lethargic and not aggressive at this time  He is hardly speaking, I can barely understand that he tells me my hands are cold  He has a weak smile occasionally but otherwise no expression  Unable to offer further complaints    Minimal p o  Intake this morning because of RN nervousness to give p o  For aspiration risk  He made a bowel movement  He is making urine via external male urethral catheter  Objective:     Vitals:   Temp (24hrs), Av 3 °F (36 8 °C), Min:97 2 °F (36 2 °C), Max:100 1 °F (37 8 °C)    Temp:  [97 2 °F (36 2 °C)-100 1 °F (37 8 °C)] 97 2 °F (36 2 °C)  HR:  [] 71  Resp:  [17-18] 17  BP: (155-228)/() 159/84  SpO2:  [94 %-100 %] 98 %  Body mass index is 21 9 kg/m²  Input and Output Summary (last 24 hours):      Intake/Output Summary (Last 24 hours) at 2022 1053  Last data filed at 2022 0701  Gross per 24 hour   Intake 1140 ml Output 201 ml   Net 939 ml       Physical Exam:   Physical Exam  Vitals and nursing note reviewed  Constitutional:       General: He is not in acute distress  Appearance: He is ill-appearing  He is not toxic-appearing or diaphoretic  Comments: Mentation wax & wane, his speech is very slurred   HENT:      Head: Normocephalic and atraumatic  Nose: Nose normal       Mouth/Throat:      Mouth: Mucous membranes are dry  Eyes:      Conjunctiva/sclera: Conjunctivae normal       Pupils: Pupils are equal, round, and reactive to light  Cardiovascular:      Rate and Rhythm: Normal rate and regular rhythm  Heart sounds: Murmur heard  Pulmonary:      Effort: Pulmonary effort is normal  No respiratory distress  Breath sounds: Rhonchi present  No wheezing or rales  Abdominal:      General: Bowel sounds are normal       Palpations: Abdomen is soft  Musculoskeletal:         General: No swelling, tenderness, deformity or signs of injury  Cervical back: Neck supple  Skin:     Coloration: Skin is pale  Findings: Bruising present  Comments: Actinic keratosis, dry skin, ecchymoses bl hands   Neurological:      General: No focal deficit present  Mental Status: He is disoriented  GCS: GCS eye subscore is 4  GCS verbal subscore is 3  GCS motor subscore is 4  Sensory: No sensory deficit        Comments: Not following commands for full neuro exam, no grossly lateralizing components   Psychiatric:      Comments: Cognitive impairment, restrained overnight, today he is slowed motor response and lethargic, no need for restraints          Additional Data:     Labs:  Results from last 7 days   Lab Units 04/30/22  0932   WBC Thousand/uL 10 40*   HEMOGLOBIN g/dL 12 6   HEMATOCRIT % 37 7   PLATELETS Thousands/uL 243   NEUTROS PCT % 78*   LYMPHS PCT % 11*   MONOS PCT % 10   EOS PCT % 0     Results from last 7 days   Lab Units 04/30/22  0932   SODIUM mmol/L 141   POTASSIUM mmol/L 3  6   CHLORIDE mmol/L 105   CO2 mmol/L 26   BUN mg/dL 23   CREATININE mg/dL 1 37*   ANION GAP mmol/L 10   CALCIUM mg/dL 10 0   ALBUMIN g/dL 3 6   TOTAL BILIRUBIN mg/dL 0 66   ALK PHOS U/L 89   ALT U/L 23   AST U/L 21   GLUCOSE RANDOM mg/dL 173*     Results from last 7 days   Lab Units 04/30/22  0932   INR  0 96     Results from last 7 days   Lab Units 05/01/22  0832 05/01/22  0716 04/30/22  2057 04/30/22  1752   POC GLUCOSE mg/dl 149* 155* 121 159*     Results from last 7 days   Lab Units 04/30/22  1652   HEMOGLOBIN A1C % 7 1*     Results from last 7 days   Lab Units 04/30/22  0940 04/30/22  0932   LACTIC ACID mmol/L 1 0  --    PROCALCITONIN ng/ml  --  0 10       Lines/Drains:  Invasive Devices  Report    Peripheral Intravenous Line            Peripheral IV 04/30/22 Right Antecubital 1 day          Drain            External Urinary Catheter Medium <1 day                      Imaging: No pertinent imaging reviewed  Recent Cultures (last 7 days):   Results from last 7 days   Lab Units 04/30/22  1024 04/30/22  0940   BLOOD CULTURE  Received in Microbiology Lab  Culture in Progress  Received in Microbiology Lab  Culture in Progress         Last 24 Hours Medication List:   Current Facility-Administered Medications   Medication Dose Route Frequency Provider Last Rate    acetaminophen  650 mg Oral Q6H [facility] Mary Simpson PA-C      amLODIPine  10 mg Oral Daily Bayron Pang MD      aspirin  81 mg Oral Daily Bayron Pang MD      atorvastatin  20 mg Oral Daily With Nicole Li MD      docusate sodium  100 mg Oral BID PRN Mary Simpson PA-C      heparin (porcine)  5,000 Units Subcutaneous [facility] Bayron Pang MD      hydrALAZINE  5 mg Intravenous Q6H PRN Bayron Pang MD      insulin lispro  1-5 Units Subcutaneous HS Bayron Pang MD      insulin lispro  1-6 Units Subcutaneous 4 times day Bayron Pang MD      labetalol  10 mg Intravenous Q4H PRN Bayron Pang MD      losartan  50 mg Oral Daily Maritza Ivey MD      melatonin  6 mg Oral HS Maritza Ivey MD      metoprolol tartrate  50 mg Oral Q12H [address] Maritza Ivey MD      morphine injection  2 mg Intravenous Q4H PRN Mary Demo, NILS      oxyCODONE  2 5 mg Oral Q4H PRN Mary Demo, NILS      oxyCODONE  5 mg Oral Q6H PRN Mary Demo, NILS      QUEtiapine  25 mg Oral HS Mary Demo, NILS      senna  1 tablet Oral HS Maritza Ivey MD      tamsulosin  0 4 mg Oral Daily With Av Jordan MD          Today, Patient Was Seen By: Mahesh Beard PA-C    **Please Note: This note may have been constructed using a voice recognition system  **

## 2022-05-01 NOTE — ASSESSMENT & PLAN NOTE
EKG is his baseline , nonischemic  Trop trended 179 - 211 - 226   Random trop now is 80  Suspect this is elevated in HTN urgency  No need for heparin gtt

## 2022-05-01 NOTE — INCIDENTAL FINDINGS
The following findings require follow up:  Radiographic finding   Finding: Ct A/P - 3  Multiple pancreatic cysts, the dominant one is larger than previous  A nonemergent pancreatic protocol CT or MRI recommended     Follow up required: pancreatic protocol CT or MRI   Follow up should be done within 12 month(s)    Please notify the following clinician to assist with the follow up:   Dr Kadi Garcia

## 2022-05-01 NOTE — ASSESSMENT & PLAN NOTE
· Wife reports 6 months history of declining mental status/ memory which waxes and wanes and he has spells" of confusion, lethargy, weakness which usually resolves in 1 day  He has been worked up multiple times for this  It sounds like a metabolic encephalopathy, dementia progression according to the way the wife describes it    · Likley underlying dementia which is never formally diagnosed  and now with acute pain, HTN and hospitalization he is delirious  · Restraints placed last night, I asked the nurse to please discontinue them this morning  · Will start seroquel 25 mg qhs   · Psych consult

## 2022-05-01 NOTE — ASSESSMENT & PLAN NOTE
Lab Results   Component Value Date    HGBA1C 7 1 (H) 04/30/2022       Recent Labs     04/30/22  1752 04/30/22 2057 05/01/22  0716 05/01/22  0832   POCGLU 159* 121 155* 149*       Blood Sugar Average: Last 72 hrs:  (P) 146     SSI

## 2022-05-01 NOTE — PHYSICAL THERAPY NOTE
PHYSICAL THERAPY EVALUATION/TREATMENT     05/01/22 1040   Note Type   Note type Evaluation;Orthotic Management/Training   Additional Comments Patient with dementia, limited education possible therefore nurse included   Type of Brace   Brace [employer] Back Pack TLSO   Additional Brace Ordered No   Bracing Recommendations Recommendation (comment)   Education   Education Provided Yes; Other (comment)  (nursing educated in donning and wear time)   End of Consult   Nurse Communication Nurse aware of consult, application of brace   Pain Assessment   Pain Assessment Tool Herrera-Baker FACES   Herrera-Baker FACES Pain Rating 0   Restrictions/Precautions   Braces or Orthoses TLSO   Home Living   Type of [address] [facility] One level  (No stairs to enter)   [address] [facility],[address]; [facility] [address]  (Transport chair)   Additional Comments Patient is a poor historian and information is obtained from documentation  Patient does not appear to use an assistive device for gait prior to admission   Prior Function   Lives With Spouse   Receives Help From Family   ADL Assistance Needs assistance   IADLs Needs assistance   Falls in the last 6 months 5 to 10   Comments As per documentation patient with numerous falls, now with new L1 fracture   General   Additional Pertinent History Chart reviewed, patient admitted with closed fracture of 1st lumbar vertebra    Patient somewhat lethargic and confused with dementia although tolerated brace fitting well   Family/Caregiver Present No   Cognition   Overall Cognitive Status Impaired   Arousal/Participation Cooperative   Orientation Level Unable to assess  (Patient with very limited verbalization, nods yes/no at time)   Following Commands Follows one step commands inconsistently   Subjective   Subjective No pain complaints   RLE Assessment   RLE Assessment   (ROM WFL, strength 3+/5)   LLE Assessment   LLE Assessment   (ROM WFL, strength 3+/5)   Coordination   Movements are Fluid and Coordinated 0   Bed Mobility   Supine to Sit 2  Maximal assistance   Additional items Assist x 1   Sit to Supine 2  Maximal assistance   Additional items Assist x 1   Transfers   Sit to Stand 2  Maximal assistance   Additional items Assist x 1   Stand to Sit 2  Maximal assistance   Additional items Assist x 1   Stand pivot 2  Maximal assistance   Additional items Assist x 2   Additional Comments Patient with dementia limited command follow and somewhat lethargic this session therefore unable to assess gait  Patient with limited effective weight-bearing on lower extremities with standing with max assist   Balance   Static Sitting Fair -   Dynamic Sitting Poor +   Static Standing Poor -   Dynamic Standing Poor -   Ambulatory   (unable)   Activity Tolerance   Activity Tolerance Patient limited by fatigue; Other (Comment)  (Limited by cognition and lethargy)   Nurse Made Aware Yes   Assessment   Prognosis Good   Problem List Decreased range of motion;Decreased strength;Decreased endurance; Impaired balance;Decreased mobility; Decreased coordination;Decreased cognition; Impaired judgement;Decreased safety awareness   Assessment Patient seen for Physical Therapy evaluation  Patient admitted with Closed fracture of first lumbar vertebra ([facility] [address] )  Comorbidities affecting patient's physical performance include:diabetes, cervical neck fracture, hypertension, prostate cancer, falls, dementia   Personal factors affecting patient at time of initial evaluation include: inability to ambulate household distances, inability to navigate community distances, inability to navigate level surfaces without external assistance, inability to perform dynamic tasks in community, decreased cognition, limited home support, positive fall history, inability to perform physical activity, inability to perform ADLS and inability to perform IADLS    Prior to admission, patient was requiring assist for functional mobility with ?walker or handhold, requiring assist for ADLS, requiring assist for IADLS, ambulating household distance and home with family assist   Please find objective findings from Physical Therapy assessment regarding body systems outlined above with impairments and limitations including weakness, decreased ROM, impaired balance, decreased endurance, impaired coordination, gait deviations, pain, decreased activity tolerance, decreased functional mobility tolerance, decreased safety awareness, impaired judgement, fall risk and decreased cognition  The Barthel Index was used as a functional outcome tool presenting with a score of Barthel Index Score: 25 today indicating marked limitations of functional mobility and ADLS  Patient's clinical presentation is currently unstable/unpredictable as seen in patient's presentation of vital sign response, changing level of pain, varying levels of cognitive performance, increased fall risk, new onset of impairment of functional mobility, decreased endurance and new onset of weakness  Pt would benefit from continued Physical Therapy treatment to address deficits as defined above and maximize level of functional mobility  As demonstrated by objective findings, the assigned level of complexity for this evaluation is high  The patient's AM-[facility] Basic Mobility Inpatient Short Form Raw Score is 6  A Raw score of less than or equal to 16 suggests the patient may benefit from discharge to post-acute rehabilitation services  Please also refer to the recommendation of the Physical Therapist for safe discharge planning      Goals   Patient Goals None stated patient with dementia   STG Expiration Date 05/08/22   Short Term Goal #1 Transfers and gait with roller walker with mod assist   Short Term Goal #2 Gait endurance to 30 feet, family education for use of TLSO   LTG Expiration Date 05/15/22   Long Term Goal #1 Transfers and gait with roller walker with supervision   Long Term Goal #2 Gait endurance to functional household distances   Plan   Treatment/Interventions ADL retraining;Functional transfer training;LE strengthening/ROM; Therapeutic exercise; Endurance training;Cognitive reorientation;Patient/family training;Equipment eval/education; Bed mobility;Gait training; Compensatory technique education   PT Frequency Other (Comment)  (5 times per week)   Recommendation   PT Discharge Recommendation Post acute rehabilitation services   AM-PAC Basic Mobility Inpatient   Turning in Bed Without Bedrails 1   Lying on Back to Sitting on Edge of Flat Bed 1   Moving Bed to Chair 1   Standing Up From Chair 1   Walk in Room 1   Climb 3-5 Stairs 1   Basic Mobility Inpatient Raw Score 6   Turning Head Towards Sound 2   Follow Simple Instructions 2   Low Function Basic Mobility Raw Score 10   Low Function Basic Mobility Standardized Score 14 65   Highest Level Of Mobility   -[facility] Goal 2: Bed activities/Dependent transfer   -[facility] Highest Level of Mobility 3: Sit at edge of bed   -[facility] Goal Achieved Yes   Barthel Index   Feeding 5   Bathing 0   Grooming Score 0   Dressing Score 0   Bladder Score 5   Bowels Score 5   Toilet Use Score 5   Transfers (Bed/Chair) Score 5   Mobility (Level Surface) Score 0   Stairs Score 0   Barthel Index Score 25   Additional Treatment Session   Start Time 1000   End Time 1040   Treatment Assessment s:  Patient without pain complaints although little to no verbalization O:  TLSO fitting completed  Sitting and standing weight shifting completed for balance activity as well as bilateral lower extremity exercise completed as listed below A:  Patient with dementia will benefit from continued physical therapy with progression as tolerated in increasing functional mobility with use of TLSO   Exercises   Hamstring Stretch Supine;5 reps;PROM; Bilateral   Heelslides Supine;10 reps;AAROM; Bilateral   Hip Flexion Sitting;5 reps;AAROM; Bilateral   Knee AROM Long Arc Quad Sitting;10 reps;AAROM; Bilateral   Ankle Pumps Supine;10 reps;AAROM; Bilateral   Heel Cord Stretch Supine;5 reps;PROM; Bilateral   Balance training  Sitting and standing balance activity completed with weight shifting   Licensure   [place] License Number  [facility] [id number]

## 2022-05-01 NOTE — CONSULTS
On-Call Telephone Note      Brian Camarillo 80 y o  male MRN: [id number]  Unit/Bed#: [address] [address] Encounter: [id number]    Per chart review, patient with PMH DM, HTN, prostate CA and h/o cervical fracture who presents with multiple falls  Patient reported to have 3 falls yesterday  Work up include CT imaging that demonstrated a L1 fracture  ROS + for back pain  BP (!) 164/111   Pulse 99   Temp 100 1 °F (37 8 °C)   Resp 18   SpO2 98%      Clinical exam per chart review, altered mental status  No motor or sensory exam documented  Imaging personally reviewed  CT abd pelvis 4/30/22 - L1 superior endplate compression without posterior element involvement  Assessment and Plan  1  Continue to monitor neurological exam  2  TLSO brace when OOB   3  Ordered baseline upright Xrays to be completed prior to discharge  4  Pain control per primary team   5  Mobilize with therapy with use of brace  6  No surgery expected  7  Plan outpatient follow-up in 2 weeks with repeat Xrays  8  Call with questions/concerns  All questions answered  Provider is in agreement with the course of action  A total of 15 minutes was spent reviewing the presentation, physical exam and formulating a management plan

## 2022-05-01 NOTE — NURSING NOTE
Patient BP is still high, as per doctor note trying the pain med and as patient looks in whole lot pain

## 2022-05-01 NOTE — PLAN OF CARE
Problem: Potential for Falls  Goal: Patient will remain free of falls  Description: INTERVENTIONS:  - Educate patient/family on patient safety including physical limitations  - Instruct patient to call for assistance with activity   - Consult OT/PT to assist with strengthening/mobility   - Keep Call bell within reach  - Keep bed low and locked with side rails adjusted as appropriate  - Keep care items and personal belongings within reach  - Initiate and maintain comfort rounds  - Make Fall Risk Sign visible to staff  - Offer Toileting every 2 Hours, in advance of need  - Initiate/Maintain bed alarm  - Obtain necessary fall risk management equipment: yes  - Apply yellow socks and bracelet for high fall risk patients  - Consider moving patient to room near nurses station  Outcome: Progressing     Problem: MOBILITY - ADULT  Goal: Maintain or return to baseline ADL function  Description: INTERVENTIONS:  -  Assess patient's ability to carry out ADLs; assess patient's baseline for ADL function and identify physical deficits which impact ability to perform ADLs (bathing, care of mouth/teeth, toileting, grooming, dressing, etc )  - Assess/evaluate cause of self-care deficits   - Assess range of motion  - Assess patient's mobility; develop plan if impaired  - Assess patient's need for assistive devices and provide as appropriate  - Encourage maximum independence but intervene and supervise when necessary  - Involve family in performance of ADLs  - Assess for home care needs following discharge   - Consider OT consult to assist with ADL evaluation and planning for discharge  - Provide patient education as appropriate  Outcome: Progressing  Goal: Maintains/Returns to pre admission functional level  Description: INTERVENTIONS:  - Perform BMAT or MOVE assessment daily    - Set and communicate daily mobility goal to care team and patient/family/caregiver     - Collaborate with rehabilitation services on mobility goals if consulted  - Perform Range of Motion 3 times a day  - Reposition patient every 2 hours    - Dangle patient 2 times a day  - Stand patient 1 times a day  - Ambulate patient 1 times a day  - Out of bed to chair 1 times a day   - Out of bed for meals 2 times a day  - Out of bed for toileting  - Record patient progress and toleration of activity level   Outcome: Progressing     Problem: Prexisting or High Potential for Compromised Skin Integrity  Goal: Skin integrity is maintained or improved  Description: INTERVENTIONS:  - Identify patients at risk for skin breakdown  - Assess and monitor skin integrity  - Assess and monitor nutrition and hydration status  - Monitor labs   - Assess for incontinence   - Turn and reposition patient  - Assist with mobility/ambulation  - Relieve pressure over bony prominences  - Avoid friction and shearing  - Provide appropriate hygiene as needed including keeping skin clean and dry  - Evaluate need for skin moisturizer/barrier cream  - Collaborate with interdisciplinary team   - Patient/family teaching  - Consider wound care consult   Outcome: Progressing     Problem: DISCHARGE PLANNING  Goal: Discharge to home or other facility with appropriate resources  Description: INTERVENTIONS:  - Identify barriers to discharge w/patient and caregiver  - Arrange for needed discharge resources and transportation as appropriate  - Identify discharge learning needs (meds, wound care, etc )  - Arrange for interpretive services to assist at discharge as needed  - Refer to Case Management Department for coordinating discharge planning if the patient needs post-hospital services based on physician/advanced practitioner order or complex needs related to functional status, cognitive ability, or social support system  Outcome: Progressing     Problem: Knowledge Deficit  Goal: Patient/family/caregiver demonstrates understanding of disease process, treatment plan, medications, and discharge instructions  Description: Complete learning assessment and assess knowledge base  Interventions:  - Provide teaching at level of understanding  - Provide teaching via preferred learning methods  Outcome: Progressing     Problem: CONFUSION/THOUGHT DISTURBANCE  Goal: Thought disturbances (confusion, delirium, depression, dementia or psychosis) are managed to maintain or return to baseline mental status and functional level  Description: INTERVENTIONS:  - Assess for possible contributors to  thought disturbance, including but not limited to medications, infection, impaired vision or hearing, underlying metabolic abnormalities, dehydration, respiratory compromise,  psychiatric diagnoses and notify attending PHYSICAN/AP  - Monitor and intervene to maintain adequate nutrition, hydration, elimination, sleep and activity  - Decrease environmental stimuli, including noise as appropriate  - Provide frequent contacts to provide refocusing, direction and reassurance as needed  Approach patient calmly with eye contact and at their level    - Levels high risk fall precautions, aspiration precautions and other safety measures, as indicated  - If delirium suspected, notify physician/AP of change in condition and request immediate in-person evaluation  - Pursue consults as appropriate including Geriatric (campus dependent), OT for cognitive evaluation/activity planning, psychiatric, pastoral care, etc   Outcome: Progressing

## 2022-05-02 NOTE — ASSESSMENT & PLAN NOTE
Lab Results   Component Value Date    HGBA1C 7 1 (H) 04/30/2022       Recent Labs     05/02/22  0901 05/02/22  1052 05/02/22  1440 05/02/22  1612   POCGLU 138 149* 172* 141*       Blood Sugar Average: Last 72 hrs:  (P) 149 [id number]     SSI  Diabetic diet

## 2022-05-02 NOTE — CASE MANAGEMENT
Case Management Assessment & Discharge Planning Note    Patient name Teodoro Patterson  Location [facility] 204/2 [address] MRN [id number]  : 1929 Date 2022       Current Admission Date: 2022  Current Admission Diagnosis:Closed fracture of first lumbar vertebra [facility])   Patient Active Problem List    Diagnosis Date Noted    Altered mental status 2022    Non-ischemic myocardial injury (non-traumatic) 2022    Closed fracture of first lumbar vertebra ([facility] [address] ) 2022    Encephalopathy 2021    Somnolence 2021    Acute kidney injury superimposed on chronic kidney disease stage 3 2021    Scalp laceration 2021    Diabetes mellitus type 2 2021    Nondisplaced fracture of sixth cervical vertebra with routine healing 2021    Pancreatic cysts 2021    Fracture of fifth cervical vertebra ([address] ) 2021    Essential hypertension 2021    Fall 2021    Fracture of fourth cervical vertebra ([facility] [address] ) 2021    Compression fracture of C7 vertebra ([facility] [address] ) 2021    Hyperlipidemia     History of prostate cancer     Skin cancer     Personal history of prostate cancer 2021    Urge incontinence of urine 2021      LOS (days): 2  Geometric Mean LOS (GMLOS) (days): 2 90  Days to GMLOS:1     OBJECTIVE:    Risk of Unplanned Readmission Score: 18     Current admission status: Inpatient    Preferred Pharmacy:   [address] [address]  Phone: [phone] Fax: [phone]    Primary Care Provider: Manish Johnson DO    Primary Insurance: MEDICARE  Secondary Insurance: AAR    ASSESSMENT:  [address] [place] [address] Representative - Spouse   Primary Phone: [phone] (Mobile)               Advance Directives  Does patient have a [address] [place] [address] Avenue?: No  Does patient currently have a Health Care decision maker?: Yes, please see Health Care Proxy section  Does patient have Advance Directives?: No  Primary Contact: Loly Dennisdiego    Readmission Root Cause  30 Day Readmission: No    Patient Information  Admitted from[de-identified] Home  Mental Status: Confused  During Assessment patient was accompanied by: Not accompanied during assessment  Assessment information provided by[de-identified] Spouse  Primary Caregiver: Spouse  Caregiver's Name[de-identified] Moon Molina Relationship to Patient[de-identified] Family Member (wife)  Caregiver's Telephone Number[de-identified] [phone]  Support Systems: Spouse/significant other,Son  [place] of Residence: [address] [place] [address] do you live in?: [address] entry access options   Select all that apply : Elevator  Type of Current Residence: Other (Comment) (condo)  In the last 12 months, was there a time when you were not able to pay the mortgage or rent on time?: No  In the last 12 months, how many places have you lived?: 1  In the last 12 months, was there a time when you did not have a steady place to sleep or slept in a shelter (including now)?: No  Homeless/housing insecurity resource given?: N/A  Living Arrangements: Lives w/ Spouse/significant other  Is patient a ?: Yes  [facility] with [facility]: No    Activities of Daily Living Prior to Admission  Functional Status: Independent  Completes ADLs independently?: Yes  Ambulates independently?: Yes  Does patient use assisted devices?: Yes  Assisted Devices (DME) used: Julia Christiansen  Does patient currently own DME?: Yes  What DME does the patient currently own?: Bedside Commode,Walker,Wheelchair,Rollator  Does patient have a history of Outpatient Therapy (PT/OT)?: No  Does the patient have a history of Short-Term Rehab?: Yes ([address] [place] [address])  Does patient have a history of HHC?: Yes ([facility])  Does patient currently have [facility]?: Yes    Current Home Health Care  Type of Current Home Care Services: Nurse visit,Home PT  [address][de-identified] Mitesh Taylorchemaia [address] Provider[de-identified] PCP    Patient Information Continued  Income Source: Pension/[employer]  Does patient have prescription coverage?: Yes ([facility])  Within the past 12 months, you worried that your food would run out before you got the money to buy more : Never true  Within the past 12 months, the food you bought just didnt last and you didnt have money to get more : Never true  Food insecurity resource given?: N/A  Does patient receive dialysis treatments?: No  Does patient have a history of substance abuse?: No  Does patient have a history of Mental Health Diagnosis?: No    Means of Transportation  Means of Transport to Appts[de-identified] Family transport  In the past 12 months, has lack of transportation kept you from medical appointments or from getting medications?: No  In the past 12 months, has lack of transportation kept you from meetings, work, or from getting things needed for daily living?: No  Was application for public transport provided?: N/A      DISCHARGE DETAILS:    Discharge planning discussed with[de-identified] WifeBryn Brine of Choice: Yes  Comments - Freedom of Choice: SW spoke with pt's wife to assess needs and discuss plans  Per wife pt was relatively independent with walker prior to Friday evening when he fell a few times  Wife expressed concern over new confusion as well  Wife is looking forward to further work up to determine why pt's condition is so changed  SW discussed discharge options including STR and home care  Wife said she will be better able to decide on plans once she knows more about pt's condition  SW offered ongoing support and assistance with planning  SW will follow to monitior progress    CM contacted family/caregiver?: Yes  Were Treatment Team discharge recommendations reviewed with patient/caregiver?: Yes  Did patient/caregiver verbalize understanding of patient care needs?: Yes  Were patient/caregiver advised of the risks associated with not following Treatment Team discharge recommendations?: Yes    Contacts  Patient Contacts: Garald Nageotte  Relationship to Patient[de-identified] Family (wife)  Contact Method: Phone  Phone Number: [phone]  Reason/Outcome: Discharge Planning    Other Referral/Resources/Interventions Provided:  Interventions: [address] Term Re[facility]  Referral Comments: SW will follow to monitor progress and assist with planning once appropriate level of care is determined    Treatment Team Recommendation:  (pending)  Discharge Destination Plan[de-identified]  (pending)

## 2022-05-02 NOTE — PLAN OF CARE
Recommended Diet: Consider holding all oral feeding until pt more alert (please consider d/c of diet vs downgrade to puree & nectar thick liquid for re-assessment with SLP in am)   Recommended Form of Meds: non-oral/IV ideally   Aspiration precautions and swallowing strategies:   1  upright posture, only feed when fully alert  2  slow rate of feeding and small bites/sips  Other Recommendations: Continue frequent oral care

## 2022-05-02 NOTE — PLAN OF CARE
Problem: Potential for Falls  Goal: Patient will remain free of falls  Description: INTERVENTIONS:  - Educate patient/family on patient safety including physical limitations  - Instruct patient to call for assistance with activity   - Consult OT/PT to assist with strengthening/mobility   - Keep Call bell within reach  - Keep bed low and locked with side rails adjusted as appropriate  - Keep care items and personal belongings within reach  - Initiate and maintain comfort rounds  - Make Fall Risk Sign visible to staff  - Offer Toileting every 2 Hours, in advance of need  - Initiate/Maintain bed alarm  - Obtain necessary fall risk management equipment: yes  - Apply yellow socks and bracelet for high fall risk patients  - Consider moving patient to room near nurses station  Outcome: Progressing     Problem: MOBILITY - ADULT  Goal: Maintain or return to baseline ADL function  Description: INTERVENTIONS:  -  Assess patient's ability to carry out ADLs; assess patient's baseline for ADL function and identify physical deficits which impact ability to perform ADLs (bathing, care of mouth/teeth, toileting, grooming, dressing, etc )  - Assess/evaluate cause of self-care deficits   - Assess range of motion  - Assess patient's mobility; develop plan if impaired  - Assess patient's need for assistive devices and provide as appropriate  - Encourage maximum independence but intervene and supervise when necessary  - Involve family in performance of ADLs  - Assess for home care needs following discharge   - Consider OT consult to assist with ADL evaluation and planning for discharge  - Provide patient education as appropriate  Outcome: Progressing  Goal: Maintains/Returns to pre admission functional level  Description: INTERVENTIONS:  - Perform BMAT or MOVE assessment daily    - Set and communicate daily mobility goal to care team and patient/family/caregiver     - Collaborate with rehabilitation services on mobility goals if consulted  - Perform Range of Motion 3 times a day  - Reposition patient every 2 hours    - Dangle patient 2 times a day  - Stand patient 1 times a day  - Ambulate patient 1 times a day  - Out of bed to chair 1 times a day   - Out of bed for meals 2 times a day  - Out of bed for toileting  - Record patient progress and toleration of activity level   Outcome: Progressing     Problem: Prexisting or High Potential for Compromised Skin Integrity  Goal: Skin integrity is maintained or improved  Description: INTERVENTIONS:  - Identify patients at risk for skin breakdown  - Assess and monitor skin integrity  - Assess and monitor nutrition and hydration status  - Monitor labs   - Assess for incontinence   - Turn and reposition patient  - Assist with mobility/ambulation  - Relieve pressure over bony prominences  - Avoid friction and shearing  - Provide appropriate hygiene as needed including keeping skin clean and dry  - Evaluate need for skin moisturizer/barrier cream  - Collaborate with interdisciplinary team   - Patient/family teaching  - Consider wound care consult   Outcome: Progressing     Problem: DISCHARGE PLANNING  Goal: Discharge to home or other facility with appropriate resources  Description: INTERVENTIONS:  - Identify barriers to discharge w/patient and caregiver  - Arrange for needed discharge resources and transportation as appropriate  - Identify discharge learning needs (meds, wound care, etc )  - Arrange for interpretive services to assist at discharge as needed  - Refer to Case Management Department for coordinating discharge planning if the patient needs post-hospital services based on physician/advanced practitioner order or complex needs related to functional status, cognitive ability, or social support system  Outcome: Progressing     Problem: Knowledge Deficit  Goal: Patient/family/caregiver demonstrates understanding of disease process, treatment plan, medications, and discharge instructions  Description: Complete learning assessment and assess knowledge base  Interventions:  - Provide teaching at level of understanding  - Provide teaching via preferred learning methods  Outcome: Progressing     Problem: CONFUSION/THOUGHT DISTURBANCE  Goal: Thought disturbances (confusion, delirium, depression, dementia or psychosis) are managed to maintain or return to baseline mental status and functional level  Description: INTERVENTIONS:  - Assess for possible contributors to  thought disturbance, including but not limited to medications, infection, impaired vision or hearing, underlying metabolic abnormalities, dehydration, respiratory compromise,  psychiatric diagnoses and notify attending PHYSICAN/AP  - Monitor and intervene to maintain adequate nutrition, hydration, elimination, sleep and activity  - Decrease environmental stimuli, including noise as appropriate  - Provide frequent contacts to provide refocusing, direction and reassurance as needed  Approach patient calmly with eye contact and at their level  - [place] high risk fall precautions, aspiration precautions and other safety measures, as indicated  - If delirium suspected, notify physician/AP of change in condition and request immediate in-person evaluation  - Pursue consults as appropriate including Geriatric (campus dependent), OT for cognitive evaluation/activity planning, psychiatric, pastoral care, etc   Outcome: Progressing     Problem: Nutrition/Hydration-ADULT  Goal: Nutrient/Hydration intake appropriate for improving, restoring or maintaining nutritional needs  Description: Monitor and assess patient's nutrition/hydration status for malnutrition  Collaborate with interdisciplinary team and initiate plan and interventions as ordered  Monitor patient's weight and dietary intake as ordered or per policy  Utilize nutrition screening tool and intervene as necessary   Determine patient's food preferences and provide high-protein, high-caloric foods as appropriate       INTERVENTIONS:  - Monitor oral intake, urinary output, labs, and treatment plans  - Assess nutrition and hydration status and recommend course of action  - Evaluate amount of meals eaten  - Assist patient with eating if necessary   - Allow adequate time for meals  - Recommend/ encourage appropriate diets, oral nutritional supplements, and vitamin/mineral supplements  - Order, calculate, and assess calorie counts as needed  - Recommend, monitor, and adjust tube feedings and TPN/PPN based on assessed needs  - Assess need for intravenous fluids  - Provide specific nutrition/hydration education as appropriate  - Include patient/family/caregiver in decisions related to nutrition  Outcome: Progressing

## 2022-05-02 NOTE — OCCUPATIONAL THERAPY NOTE
Occupational Therapy Evaluation       05/02/22 1030   Note Type   Note type Evaluation   Restrictions/Precautions   Braces or Orthoses TLSO   Other Precautions Cognitive; Chair Alarm; Bed Alarm; Fall Risk   Pain Assessment   Pain Assessment Tool FLACC   Pain Rating: FLACC (Rest) - Face 0   Pain Rating: FLACC (Rest) - Legs 0   Pain Rating: FLACC (Rest) - Activity 0   Pain Rating: FLACC (Rest) - Cry 0   Pain Rating: FLACC (Rest) - Consolability 0   Score: FLACC (Rest) 0   Pain Rating: FLACC (Activity) - Face 0   Pain Rating: FLACC (Activity) - Legs 0   Pain Rating: FLACC (Activity) - Activity 0   Pain Rating: FLACC (Activity) - Cry 0   Pain Rating: FLACC (Activity) - Consolability 0   Score: FLACC (Activity) 0   Home Living   Type of Home House   Home Layout One level  (No LOC)   Home Equipment Walker; Wheelchair-manual   Additional Comments Patient presented to hospital s/p several falls at home; dx with acute L1 fracture   Prior Function   Level of [place] Needs assistance with ADLs and functional mobility; Independent with ADLs and functional mobility   Lives With Spouse   Receives Help From Family   ADL Assistance Independent   IADLs Needs assistance   Falls in the last 6 months 5 to 10   Comments Patient is a poor historian due to impaired cognition; patient very lethargic and poor cognition, unable to offer history or PLOF; information obtained from chart   ADL   Eating Assistance 3  Moderate Assistance   Grooming Assistance 3  Moderate Assistance   [address] 2  Maximal Assistance   LB Bathing Assistance 1  Total Assistance   UB Dressing Assistance 2  Maximal Assistance    [facility] 1  Total [address]  1  Total Assistance   Additional Comments TLSO donned with total assist while patient seated EOB; doffed after standing trials with total assist   Bed Mobility   Supine to Sit 2  Maximal assistance   Additional items Assist x 2   Sit to Supine 2  Maximal assistance Additional items Assist x 2   Transfers   Sit to Stand 2  Maximal assistance   Additional items Assist x 2   Stand to Sit 2  Maximal assistance   Additional items Assist x 2   Additional Comments STS from EOB with RW for support; difficulty maintaining upright stand for greater than few seconds due to weakness and due to cognitive limitations; unable to attempt steps   Balance   Static Sitting Fair -   Dynamic Sitting Poor +   Static Standing Poor   Activity Tolerance   Activity Tolerance Patient limited by fatigue; Other (Comment)  (Limited by cognition)   RUE Assessment   RUE Assessment WFL   LUE Assessment   LUE Assessment WFL   Cognition   Overall Cognitive Status Impaired   Arousal/Participation Lethargic   Attention Difficulty attending to directions   Orientation Level Disoriented X4   Following Commands Follows one step commands inconsistently   Comments Patient very lethargic and difficulty following commands, follows one step commands less then 25% of the time   Assessment   Limitation Decreased ADL status; Decreased UE strength;Decreased Safe judgement during ADL;Decreased cognition;Decreased endurance;Decreased self-care trans;Decreased high-level ADLs   Prognosis Fair   Assessment Patient evaluated by Occupational Therapy  Patient admitted with Closed fracture of first lumbar vertebra ([facility] [address] )  The patients occupational profile, medical and therapy history includes a extensive additional review of physical, cognitive, or psychosocial history related to current functional performance  Comorbidities affecting functional mobility and ADLS include: HTN, HLD, prostate cancer, skin cancer  Prior to admission, patient was independent with functional mobility with walker, independent with ADLS and requiring assist for IADLS    The evaluation identifies the following performance deficits: weakness, impaired balance, decreased endurance, increased fall risk, new onset of impairment of functional mobility, decreased ADLS, decreased IADLS, decreased activity tolerance, decreased safety awareness, impaired judgement, decreased cognition, decreased strength and impaired psychosocial skills, that result in activity limitations and/or participation restrictions  This evaluation requires clinical decision making of high complexity, because the patient presents with comorbidites that affect occupational performance and required significant modification of tasks or assistance with consideration of multiple treatment options  The Barthel Index was used as a functional outcome tool presenting with a score of Barthel Index Score: 20, indicating marked limitations of functional mobility and ADLS  The patient's raw score on the -PAC Daily Activity inpatient short form low function score is 14, standardized score is Low Function Daily Activity Standardized Score: 24 79  Patients with a standardized score less than 39 4 are likely to benefit from discharge to post-acute rehab services  Please refer to the recommendation of the Occupational Therapist for safe discharge planning  Patient will benefit from skilled Occupational Therapy services to address above deficits and facilitate a safe return to prior level of function  Goals   Patient Goals Unable to state due to impaired cognition   STG Time Frame   (1-7 days)   Short Term Goal  Goals established to promote Patient Goals: Unable to state due to impaired cognition:  Patient will increase standing tolerance to 5 minutes during ADL task to decrease assistance level and decrease fall risk; Patient will increase bed mobility to mod assist of 2 in preparation for ADLS and transfers;  Patient will increase functional mobility to and from bedside commode with rolling walker with mod assist of 2 to increase performance with ADLS and to use a toilet; Patient will tolerate 5 minutes of UE ROM/strengthening to increase general activity tolerance and performance in ADLS/IADLS; Patient will improve functional activity tolerance to 5 minutes of sustained functional tasks to increase participation in basic self-care and decrease assistance level;  Patient will be able to to verbalize understanding and perform energy conservation/proper body mechanics during ADLS and functional mobility at least 25% of the time with maximal cueing to decrease signs of fatigue and increase stamina to return to prior level of function; Patient will increase dynamic sitting balance to fair- to improve the ability to sit at edge of bed or on a chair for ADLS;  Patient will increase static/dynamic standing balance to poor+ to improve postural stability and decrease fall risk during standing ADLS and transfers  LTG Time Frame   (8-14 days)   Long Term Goal Patient will increase standing tolerance to 10 minutes during ADL task to decrease assistance level and decrease fall risk; Patient will increase bed mobility to mod assist in preparation for ADLS and transfers;  Patient will increase functional mobility to and from bathroom with rolling walker with mod assist to increase performance with ADLS and to use a toilet; Patient will tolerate 10 minutes of UE ROM/strengthening to increase general activity tolerance and performance in ADLS/IADLS; Patient will improve functional activity tolerance to 10 minutes of sustained functional tasks to increase participation in basic self-care and decrease assistance level;  Patient will be able to to verbalize understanding and perform energy conservation/proper body mechanics during ADLS and functional mobility at least 50% of the time with moderate cueing to decrease signs of fatigue and increase stamina to return to prior level of function; Patient will increase static/dynamic sitting balance to fair to improve the ability to sit at edge of bed or on a chair for ADLS;  Patient will increase static/dynamic standing balance to fair- to improve postural stability and decrease fall risk during standing ADLS and transfers  Pt will score >/= 14/24 on AM-PAC Daily Activity Inpatient scale to promote safe independence with ADLs and functional mobility; Pt will score >/= 50/100 on Barthel Index in order to decrease caregiver assistance needed and increase ability to perform ADLs and functional mobility  Functional Transfer Goals   Pt Will Perform All Functional Transfers   (STG mod assist x 2, LTG mod assist)   ADL Goals   Pt Will Perform Eating   (STG)   Pt Will Perform Grooming   (STG min assist, LTG supervision)   Pt Will Perform Bathing   (STG max assist, LTG mod assist)   Pt Will Perform UE Dressing   (STG mod assist, LTG min assist)   Pt Will Perform LE Dressing   (STG max assist, LTG mod assist)   Pt Will Perform Toileting   (STG max assist, LTG mod assist)   Plan   Treatment Interventions ADL retraining;Functional transfer training;UE strengthening/ROM; Endurance training;Patient/family training;Equipment evaluation/education; Compensatory technique education;Continued evaluation; Energy conservation; Activityengagement   Goal Expiration Date 05/16/22   OT Frequency 3-5x/wk   Recommendation   OT Discharge Recommendation Post acute rehabilitation services   AM-[facility] Daily Activity Inpatient   Lower Body Dressing 1   Bathing 1   Toileting 1   Upper Body Dressing 2   Grooming 2   Eating 2   Daily Activity Raw Score 9   Turning Head Towards Sound 3   Follow Simple Instructions 2   Low Function Daily Activity Raw Score 14   Low Function Daily Activity Standardized Score 24 79   AM-PAC Applied Cognition Inpatient   Following a Speech/Presentation 2   Understanding Ordinary Conversation 2   Taking Medications 1   Remembering Where Things Are Placed or Put Away 2   Remembering List of 4-5 Errands 1   Taking Care of Complicated Tasks 1   Applied Cognition Raw Score 9   Applied Cognition Standardized Score 22 48   Barthel Index   Feeding 5   Bathing 0   Grooming Score 0   Dressing Score 0   Bladder Score 0 Bowels Score 5   Toilet Use Score 5   Transfers (Bed/Chair) Score 5   Mobility (Level Surface) Score 0   Stairs Score 0   Barthel Index Score 21   Licensure   [place] License Number  Patt Mcgregor, OTR/L [id number]

## 2022-05-02 NOTE — PHYSICAL THERAPY NOTE
PT TREATMENT     05/02/22 1015   Note Type   Note Type Treatment   End of Consult   Patient Position at End of Consult Supine   Pain Assessment   Pain Assessment Tool Herrera-Baker FACES   Herrera-Baker FACES Pain Rating 0   Restrictions/Precautions   Braces or Orthoses TLSO   Other Precautions Fall Risk;Bed Alarm; Chair Alarm;Cognitive   General   Chart Reviewed Yes   Cognition   Overall Cognitive Status Impaired   Arousal/Participation Lethargic;Persistent stimuli required   Attention Difficulty attending to directions   Following Commands Follows one step commands inconsistently   Subjective   Subjective Pt attempts to verbalize but difficult to understand   Bed Mobility   Supine to Sit 2  Maximal assistance   Additional items Assist x 2;Verbal cues   Sit to Supine 2  Maximal assistance   Additional items Assist x 2;Verbal cues   Additional Comments TLSO fitted to pt while pt seated EOB  While seated EOB, pt worked on maintaining static sitting balance   Transfers   Sit to Stand 2  Maximal assistance   Additional items Assist x 2;Verbal cues   Stand to Sit 2  Maximal assistance   Additional items Assist x 2;Verbal cues   Additional Comments Pt sit to stand trans with max A + 2 and stood with the RW for 10 seconds with max A + 2 - pt with limited weight bearing BUE and LEs with significant retropulsion, standing with hip and knee flexion   Balance   Static Sitting Fair -   Static Standing Poor   Activity Tolerance   Activity Tolerance Patient limited by fatigue;Treatment limited secondary to medical complications (Comment)  (cognition)   Exercises   Heelslides PROM;10 reps; Supine;Bilateral   Hip Abduction PROM;10 reps; Supine;Bilateral   Ankle Pumps PROM;10 reps; Supine;Bilateral   Assessment   Assessment Pt with limited participation in PT this AM due to impaired cognition and lethargy  Pt needs max A + 2 for all mobility and remains appropriate for post acute rehab services when medically stable for dc     Plan Treatment/Interventions ADL retraining;Functional transfer training;LE strengthening/ROM; Therapeutic exercise; Endurance training;Cognitive reorientation;Patient/family training;Equipment eval/education; Bed mobility;Gait training; Compensatory technique education   PT Frequency Other (Comment)  (5x/wk)   Recommendation   PT Discharge Recommendation Post acute rehabilitation services   AM-PAC Basic Mobility Inpatient   Turning in Bed Without Bedrails 2   Lying on Back to Sitting on Edge of Flat Bed 1   Moving Bed to Chair 1   Standing Up From Chair 1   Walk in Room 1   Climb 3-5 Stairs 1   Basic Mobility Inpatient Raw Score 7   Turning Head Towards Sound 2   Follow Simple Instructions 2   Low Function Basic Mobility Raw Score 11   Low Function Basic Mobility Standardized Score 16 55   Highest Level Of Mobility   JH-HLM Goal 2: Bed activities/Dependent transfer   JH-HLM Highest Level of Mobility 3: Sit at edge of bed   JH-HLM Goal Achieved Yes   Education   Education Provided Mobility training;Assistive device   Patient Reinforcement needed   End of Consult   Patient Position at End of Consult Supine; All needs within reach;Bed/Chair alarm activated   Licensure   [place] License Number  [id number] [place] [address] [id number]

## 2022-05-02 NOTE — PROGRESS NOTES
[address]  Progress Note Reyes Munster 4/12/1929, 80 y o  male MRN: [id number]  Unit/Bed#: [address] Encounter: [id number]  Primary Care Provider: Kb Melendez DO   Date and time admitted to hospital: 4/30/2022  9:14 AM    * Closed fracture of first lumbar vertebra [facility])  Assessment & Plan  Falls at home in the bathroom no LOC per family report  L1 fracture on ct  - consult to ns placed, appreciated recs they will fu in 2 weeks op; obtain baseline upright spine xray; will have repeat upright spine prior to being seen by outpatient neurosurgery  - TLSO brace  - pain control ordered but patient hasn't been utilizing it much  Round the clock Tylenol ordered for patient  - Pt/OT evals    Non-ischemic myocardial injury (non-traumatic)  Assessment & Plan  EKG is his baseline , nonischemic due to hypertensive urgency as evidence by elevated troponins above the 99th percentile with no evidence of ischemia requiring EKG and trending of troponins with additional random troponin  Trop trended 179 - 211 - 226  Random trop now is 141  No need for heparin gtt    Altered mental status  Assessment & Plan  · Wife reports 6 months history of declining mental status/ memory which waxes and wanes and he has spells" of confusion, lethargy, weakness which usually resolves in 1 day  He has been worked up multiple times for this  It sounds like a metabolic encephalopathy, dementia progression according to the way the wife describes it  · Likley underlying dementia which is never formally diagnosed  and now with acute pain, HTN and hospitalization he is delirious  · Restraints placed night of 5/1  · Started seroquel 25 mg qhs   · Psych consulted; follow-up on results  5/2 spoke at length with wife at bedside, she indicated that the patient has never been this delirious before    Wife indicated that patient's speech is normally clear, able to eat and drink without any difficulties, ambulates with a walker at home and interacts with family appropriately  CT scan of head performed when patient came in showed chronic lacunar infarctions, no change from previous CT  Microangiopathic changes  Worsening right maxillory-sinus disease  Ordered ceftriaxone for right maxillory- sinus disease      Ammonia level ordered, less than 10  ABG ordered, follow-up on results  MRI of brain ordered; follow up on results   Neuro checks q 4 hours    Essential hypertension  Assessment & Plan  bp elevated, likely in part due to pain  - restart home bp medications  - prn hydralazine/labetalol  Blood pressure improved    Diabetes mellitus type 2  Assessment & Plan  Lab Results   Component Value Date    HGBA1C 7 1 (H) 2022       Recent Labs     22  0901 22  1052 22  1440 22  1612   POCGLU 138 149* 172* 141*       Blood Sugar Average: Last 72 hrs:  (P) 149 [id number]     SSI  Diabetic diet      VTE Pharmacologic Prophylaxis:   Pharmacologic: Heparin  Mechanical VTE Prophylaxis in Place: Yes    Patient Centered Rounds: I have performed bedside rounds with nursing staff today  Discussions with Specialists or Other Care Team Provider: Yes  Education and Discussions with Family / Patient:Yes  Time Spent for Care: 30 minutes  More than 50% of total time spent on counseling and coordination of care as described above  Current Length of Stay: 2 day(s)  Current Patient Status: Inpatient     Discharge Plan:  Not stable for discharge today, most likely in the next 48-72 hours pending patient's progress    Code Status: Level 3 - DNAR and DNI      Subjective:   Patient seen sleeping, opens eyes when spoken to, mumbles when responding, was able to shake head yes and no to simple questions  Drops off to sleep again, noted patient to be snoring        Objective:     Vitals:   Temp (24hrs), Av 1 °F (36 7 °C), Min:97 8 °F (36 6 °C), Max:98 2 °F (36 8 °C)    Temp:  [97 8 °F (36 6 °C)-98 2 °F (36 8 °C)] 98 1 °F (36 7 °C)  HR:  [63-94] 63  Resp:  [18-20] 20  BP: (160-182)/(65-88) 163/65  SpO2:  [96 %-97 %] 96 %  Body mass index is 21 87 kg/m²  Input and Output Summary (last 24 hours):   No intake or output data in the 24 hours ending 05/02/22 1631     Physical Exam:     Physical Exam  Vitals and nursing note reviewed  Constitutional:       General: He is not in acute distress  Appearance: He is ill-appearing  Comments: Patient very lethargic, does open eyes when spoken to, difficult to understand what he is saying  Did not his head yes and no when asked simple questions, does move all extremities however not to command  HENT:      Head: Normocephalic  Nose: Nose normal       Mouth/Throat:      Mouth: Mucous membranes are dry  Eyes:      Extraocular Movements: Extraocular movements intact  Conjunctiva/sclera: Conjunctivae normal    Cardiovascular:      Rate and Rhythm: Regular rhythm  Heart sounds: Murmur heard  Pulmonary:      Comments: Diminished bilaterally  Abdominal:      General: Bowel sounds are normal  There is no distension  Palpations: Abdomen is soft  Tenderness: There is no abdominal tenderness  Genitourinary:     Comments: Voiding spontaneously  Musculoskeletal:      Cervical back: Normal range of motion  Right lower leg: No edema  Left lower leg: No edema  Comments: Patient able to move all extremities spontaneously, generalized deconditioning noted   Skin:     Capillary Refill: Capillary refill takes less than 2 seconds  Comments: Multiple ecchymotic areas noted bilateral hands and forearms   Neurological:      Comments: Patient opens eyes, mumbles and then returns back to sleep     Psychiatric:      Comments: Lethargic         Additional Data:     Labs:    Results from last 7 days   Lab Units 05/02/22  0810 04/30/22  0932   WBC Thousand/uL 10 49* 10 40*   HEMOGLOBIN g/dL 11 3* 12 6   HEMATOCRIT % 34 2* 37 7   PLATELETS Thousands/uL 213 243   NEUTROS PCT %  --  78*     Results from last 7 days   Lab Units 05/02/22  0810 04/30/22  0932   SODIUM mmol/L 142 141   POTASSIUM mmol/L 3 2* 3 6   CHLORIDE mmol/L 106 105   CO2 mmol/L 25 26   BUN mg/dL 23 23   CREATININE mg/dL 1 43* 1 37*   CALCIUM mg/dL 9 5 10 0   TOTAL BILIRUBIN mg/dL  --  0 66   ALK PHOS U/L  --  89   ALT U/L  --  23   AST U/L  --  21     Results from last 7 days   Lab Units 04/30/22  0932   INR  0 96         Lab Results   Component Value Date/Time    HGBA1C 7 1 (H) 04/30/2022 04:52 PM     Results from last 7 days   Lab Units 05/02/22  1612 05/02/22  1440 05/02/22  1052 05/02/22  0901 05/02/22  0707 05/01/22  2039 05/01/22  1752 05/01/22  1609 05/01/22  1403 05/01/22  1100 05/01/22  0832 05/01/22  0716   POC GLUCOSE mg/dl 141* 172* 149* 138 144* 121 174* 160* 155* 151* 149* 155*     Results from last 7 days   Lab Units 04/30/22  0940 04/30/22  0932   LACTIC ACID mmol/L 1 0  --    PROCALCITONIN ng/ml  --  0 10       * I Have Reviewed All Lab Data Listed Above  * Additional Pertinent Lab Tests Reviewed: [place] [address] Admission Reviewed    Imaging:     XR chest 1 view   ED Interpretation by Baldo Kramer PA-C (04/30 1125)   Multifocal infiltrates - consider aspiration vs edema vs covid      Final Result by Isabella Zhao MD (05/01 8108)      Mild pulmonary vascular congestion  Otherwise, no acute abnormality in the chest                   Workstation performed: [id number]         CT spine cervical wo contrast   Final Result by Berta Cedillo MD (04/30 1116)      No cervical spine fracture or traumatic malalignment  Workstation performed: [id number]         CT abdomen pelvis wo contrast   Final Result by Berta Cedillo MD (04/30 1131)      1  Acute L1 fracture  2   1 cm right pericardial effusion  3   Multiple pancreatic cysts, the dominant one is larger than previous    A nonemergent pancreatic protocol CT or MRI recommended  The study was marked in [facility] for immediate notification  Workstation performed: [id number]         CT head without contrast   Final Result by No Malone MD (04/30 1106)      No acute intracranial abnormality  Microangiopathic changes  Worsening right maxillary sinus disease                    Workstation performed: [id number]         XR spine lumbar 2 or 3 views injury    (Results Pending)   MRI brain wo contrast    (Results Pending)     Imaging Reports Reviewed by myself    Cultures:   Blood Culture:   Lab Results   Component Value Date    BLOODCX No Growth at 48 hrs  04/30/2022    BLOODCX No Growth at 48 hrs  04/30/2022     Urine Culture: No results found for: URINECX  Sputum Culture: No components found for: SPUTUMCX  Wound Culture: No results found for: WOUNDCULT    Last 24 Hours Medication List:   Current Facility-Administered Medications   Medication Dose Route Frequency Provider Last Rate    acetaminophen  650 mg Oral Q6H [facility] Mary Simpson PA-C      amLODIPine  10 mg Oral Daily Bayron Pang MD      aspirin  81 mg Oral Daily Bayron Pang MD      atorvastatin  20 mg Oral Daily With Nicole Li MD      cefTRIAXone  1,000 mg Intravenous Q24H TR [place]      docusate sodium  100 mg Oral BID PRN Mary Simpson PA-C      heparin (porcine)  5,000 Units Subcutaneous [facility] Bayron Pang MD      hydrALAZINE  5 mg Intravenous Q6H PRN Bayron Pang MD      insulin lispro  1-5 Units Subcutaneous HS Bayron Pang MD      insulin lispro  1-6 Units Subcutaneous 4 times day Bayron Pang MD      labetalol  10 mg Intravenous Q4H PRN Bayron Pang MD      losartan  50 mg Oral Daily Bayron Pang MD      melatonin  6 mg Oral HS Bayron Pang MD      metoprolol tartrate  50 mg Oral Q12H [facility] Bayron Pang MD      morphine injection  2 mg Intravenous Q4H PRN Mary Simpson PA-C      oxyCODONE  2 5 mg Oral Q4H PRN Kindrajacob Daniel NILS      oxyCODONE  5 mg Oral Q6H PRN Mary Simpson, NILS      potassium chloride  20 mEq Intravenous Once TR Fernandes      Followed by   Chiquita Allen potassium chloride  20 mEq Intravenous Once TR [place]es      QUEtiapine  25 mg Oral HS Mary Simpson, NILS      senna  1 tablet Oral HS Berto Fitzgerald MD      tamsulosin  0 4 mg Oral Daily With Jhonny Bustos MD          Today, Patient Was Seen By: TR Fernandes    ** Please Note: Dragon 360 Dictation voice to text software may have been used in the creation of this document   **

## 2022-05-02 NOTE — SPEECH THERAPY NOTE
Speech-Language Pathology Bedside Swallow Evaluation      Patient Name: Griffin Nazario    UACAF'Q Date: 5/2/2022     Problem List  Principal Problem:    Closed fracture of first lumbar vertebra [facility])  Active Problems:    Essential hypertension    Diabetes mellitus type 2    Altered mental status    Non-ischemic myocardial injury (non-traumatic)      Past Medical History  Past Medical History:   Diagnosis Date    HLD (hyperlipidemia)     HTN (hypertension)     Prostate CA ([address] )     Skin cancer        Past Surgical History  Past Surgical History:   Procedure Laterality Date    APPENDECTOMY      CYSTOSCOPY      INGUINAL HERNIA REPAIR      THYROID SURGERY      possible cyst removal        Summary   Pt presented with significantly impaired sustained alertness and related s/s significant oral dysphagia  No s/s aspiration with the limited tsps of puree, 1-2 ozs of nectar thick clear juice or 1 sip of ensure but risk for aspiration is significant given impaired alertness at this time  Recommended Diet: Consider holding all oral feeding until pt more alert (please consider d/c of diet vs downgrade to puree & nectar thick liquid for re-assessment with SLP in am)   Recommended Form of Meds: non-oral/IV ideally   Aspiration precautions and swallowing strategies:   1  upright posture, only feed when fully alert  2  slow rate of feeding and small bites/sips  Other Recommendations: Continue frequent oral care        Current Medical Status  Griffin Nazario is a 81 yo M with a PMH of diabetes, cervical neck fracture, hypertension, prostate cancer who presented 4/30 s/p multiple falls  In the ER patient was found to have significantly elevated blood pressure as well as the L1 fracture and he was admitted to Medicine  Unfortunately patient's mental status is questionable at this time  Given AMS, there is concern for dysphagia/aspiration risk and SLP Swallowing Evaluation ordered at this time       Current Precautions:  Fall & Aspiration     Allergies:  No known food allergies    Past medical history:  Please see H&P for details    Special Studies:  Acute L1 fx per CT of abd/pelvis  C spine XR: Moderate multilevel cervical degenerative changes are noted  No critical central canal stenosis   No cervical spine fracture or traumatic malalignment  Social/Education/Vocational Hx:  Pt lives with family    Swallow Information   Current Risks for Dysphagia & Aspiration: AMS  Current Diet: regular diet and thin liquids   Baseline Diet: regular diet and thin liquids      Baseline Assessment   Behavior/Cognition: low alertness level, unable to sustain alertness  Speech/Language Status: able to follow commands inconsistently and limited verbal output of confused content  Patient Positioning: upright in bed  Pain Status/Interventions/Response to Interventions: No nonverbal indications of pain  Swallow Mechanism Exam  Labial/Facial: no asymmetry at rest but not following commands for other volitional movement tasks  Lingual: unable to test 2/2 limited command following  Dentition: partial natural dentition (no plates in place)  Vocal quality:clear but weak   Volitional Cough: unable to initiate volitional cough   Respiratory Status: on RA with baseline O2 sat of 97%      Consistencies Assessed and Performance   Materials administered included tsps of puree/shanti pudding, 1-2 ozs of nectar thick clear juice or 1 sip of ensure     Oral Stage: Impaired  Weak and inconsistent bolus retrieval, formation and transfer with puree c significant cues required for pt to transfer puree/pudding  Improved transfer time and efficiency with ntl but limtied amounts provided as pt was unable to sustain alertness  +Mild oral residue noted with puree  Pharyngeal Stage: Risk for aspiration  Swallow Mechanics:  Swallowing initiation appeared prompt with small amount of liquid but delayed w/pureed     Laryngeal rise was palpated and judged to be fair to within functional limits  No coughing, throat clearing, change in vocal quality or respiratory status noted today  Esophageal Concerns: unknown at this time  Summary and Recommendations (see above)    Results Reviewed with: RN, TR and family (wife)    Treatment Recommended: yes, frequency TBD (s medically and clinically indicated)    Patient Stated Goal: unable to state    Dysphagia LTG  -Patient will demonstrate safe and effective oral intake (without overt s/s significant oral/pharyngeal dysphagia including s/s penetration or aspiration) for the highest appropriate diet level  Short Term Goals:  -Pt will tolerate Dysphagia 1/pureed diet and nectar thick liquid with no significant s/s oral or pharyngeal dysphagia across 1-3 diagnostic session/s    -Patient will tolerate trials of upgraded food and/or liquid texture with no significant s/s of oral or pharyngeal dysphagia including aspiration across 1-3 diagnostic sessions     -If indicated, pt will comply with a Video/Modified Barium Swallow study for more complete assessment of swallowing anatomy/physiology/aspiration risk and to assess efficacy of treatment techniques so as to best guide treatment plan    -Patients caregiver will demonstrate understanding, recall and use of recommended diet and (prompting for use of) precautions/ strategies       Andrae Nettles[address] [id number]

## 2022-05-02 NOTE — ASSESSMENT & PLAN NOTE
EKG is his baseline , nonischemic due to hypertensive urgency as evidence by elevated troponins above the 99th percentile with no evidence of ischemia requiring EKG and trending of troponins with additional random troponin  Trop trended 179 - 211 - 226   Random trop now is 141  No need for heparin gtt

## 2022-05-02 NOTE — ASSESSMENT & PLAN NOTE
Falls at home in the bathroom no LOC per family report  L1 fracture on ct  - consult to ns placed, appreciated recs they will fu in 2 weeks op; obtain baseline upright spine xray; will have repeat upright spine prior to being seen by outpatient neurosurgery  - TLSO brace  - pain control ordered but patient hasn't been utilizing it much   Round the clock Tylenol ordered for patient  - Pt/OT diana

## 2022-05-02 NOTE — ASSESSMENT & PLAN NOTE
· Wife reports 6 months history of declining mental status/ memory which waxes and wanes and he has spells" of confusion, lethargy, weakness which usually resolves in 1 day  He has been worked up multiple times for this  It sounds like a metabolic encephalopathy, dementia progression according to the way the wife describes it  · Juanley underlying dementia which is never formally diagnosed  and now with acute pain, HTN and hospitalization he is delirious  · Restraints placed night of 5/1  · Started seroquel 25 mg qhs   · Psych consulted; follow-up on results  5/2 spoke at length with wife at bedside, she indicated that the patient has never been this delirious before  Wife indicated that patient's speech is normally clear, able to eat and drink without any difficulties, ambulates with a walker at home and interacts with family appropriately  CT scan of head performed when patient came in showed chronic lacunar infarctions, no change from previous CT  Microangiopathic changes  Worsening right maxillory-sinus disease  Ordered ceftriaxone for right maxillory- sinus disease      Ammonia level ordered, less than 10  ABG ordered, follow-up on results      MRI of brain ordered; follow up on results   Neuro checks q 4 hours

## 2022-05-02 NOTE — PLAN OF CARE
Problem: Potential for Falls  Goal: Patient will remain free of falls  Description: INTERVENTIONS:  - Educate patient/family on patient safety including physical limitations  - Instruct patient to call for assistance with activity   - Consult OT/PT to assist with strengthening/mobility   - Keep Call bell within reach  - Keep bed low and locked with side rails adjusted as appropriate  - Keep care items and personal belongings within reach  - Initiate and maintain comfort rounds  - Make Fall Risk Sign visible to staff  - Offer Toileting every 2 Hours, in advance of need  - Initiate/Maintain bed alarm  - Obtain necessary fall risk management equipment: yes  - Apply yellow socks and bracelet for high fall risk patients  - Consider moving patient to room near nurses station  Outcome: Progressing     Problem: MOBILITY - ADULT  Goal: Maintain or return to baseline ADL function  Description: INTERVENTIONS:  -  Assess patient's ability to carry out ADLs; assess patient's baseline for ADL function and identify physical deficits which impact ability to perform ADLs (bathing, care of mouth/teeth, toileting, grooming, dressing, etc )  - Assess/evaluate cause of self-care deficits   - Assess range of motion  - Assess patient's mobility; develop plan if impaired  - Assess patient's need for assistive devices and provide as appropriate  - Encourage maximum independence but intervene and supervise when necessary  - Involve family in performance of ADLs  - Assess for home care needs following discharge   - Consider OT consult to assist with ADL evaluation and planning for discharge  - Provide patient education as appropriate  Outcome: Progressing  Goal: Maintains/Returns to pre admission functional level  Description: INTERVENTIONS:  - Perform BMAT or MOVE assessment daily    - Set and communicate daily mobility goal to care team and patient/family/caregiver     - Collaborate with rehabilitation services on mobility goals if consulted  - Perform Range of Motion 3 times a day  - Reposition patient every 2 hours    - Dangle patient 2 times a day  - Stand patient 1 times a day  - Ambulate patient 1 times a day  - Out of bed to chair 1 times a day   - Out of bed for meals 2 times a day  - Out of bed for toileting  - Record patient progress and toleration of activity level   Outcome: Progressing     Problem: Prexisting or High Potential for Compromised Skin Integrity  Goal: Skin integrity is maintained or improved  Description: INTERVENTIONS:  - Identify patients at risk for skin breakdown  - Assess and monitor skin integrity  - Assess and monitor nutrition and hydration status  - Monitor labs   - Assess for incontinence   - Turn and reposition patient  - Assist with mobility/ambulation  - Relieve pressure over bony prominences  - Avoid friction and shearing  - Provide appropriate hygiene as needed including keeping skin clean and dry  - Evaluate need for skin moisturizer/barrier cream  - Collaborate with interdisciplinary team   - Patient/family teaching  - Consider wound care consult   Outcome: Progressing     Problem: DISCHARGE PLANNING  Goal: Discharge to home or other facility with appropriate resources  Description: INTERVENTIONS:  - Identify barriers to discharge w/patient and caregiver  - Arrange for needed discharge resources and transportation as appropriate  - Identify discharge learning needs (meds, wound care, etc )  - Arrange for interpretive services to assist at discharge as needed  - Refer to Case Management Department for coordinating discharge planning if the patient needs post-hospital services based on physician/advanced practitioner order or complex needs related to functional status, cognitive ability, or social support system  Outcome: Progressing     Problem: Knowledge Deficit  Goal: Patient/family/caregiver demonstrates understanding of disease process, treatment plan, medications, and discharge instructions  Description: Complete learning assessment and assess knowledge base  Interventions:  - Provide teaching at level of understanding  - Provide teaching via preferred learning methods  Outcome: Progressing     Problem: CONFUSION/THOUGHT DISTURBANCE  Goal: Thought disturbances (confusion, delirium, depression, dementia or psychosis) are managed to maintain or return to baseline mental status and functional level  Description: INTERVENTIONS:  - Assess for possible contributors to  thought disturbance, including but not limited to medications, infection, impaired vision or hearing, underlying metabolic abnormalities, dehydration, respiratory compromise,  psychiatric diagnoses and notify attending PHYSICAN/AP  - Monitor and intervene to maintain adequate nutrition, hydration, elimination, sleep and activity  - Decrease environmental stimuli, including noise as appropriate  - Provide frequent contacts to provide refocusing, direction and reassurance as needed  Approach patient calmly with eye contact and at their level  - [place] high risk fall precautions, aspiration precautions and other safety measures, as indicated  - If delirium suspected, notify physician/AP of change in condition and request immediate in-person evaluation  - Pursue consults as appropriate including Geriatric (campus dependent), OT for cognitive evaluation/activity planning, psychiatric, pastoral care, etc   Outcome: Progressing     Problem: Nutrition/Hydration-ADULT  Goal: Nutrient/Hydration intake appropriate for improving, restoring or maintaining nutritional needs  Description: Monitor and assess patient's nutrition/hydration status for malnutrition  Collaborate with interdisciplinary team and initiate plan and interventions as ordered  Monitor patient's weight and dietary intake as ordered or per policy  Utilize nutrition screening tool and intervene as necessary   Determine patient's food preferences and provide high-protein, high-caloric foods as appropriate       INTERVENTIONS:  - Monitor oral intake, urinary output, labs, and treatment plans  - Assess nutrition and hydration status and recommend course of action  - Evaluate amount of meals eaten  - Assist patient with eating if necessary   - Allow adequate time for meals  - Recommend/ encourage appropriate diets, oral nutritional supplements, and vitamin/mineral supplements  - Order, calculate, and assess calorie counts as needed  - Recommend, monitor, and adjust tube feedings and TPN/PPN based on assessed needs  - Assess need for intravenous fluids  - Provide specific nutrition/hydration education as appropriate  - Include patient/family/caregiver in decisions related to nutrition  Outcome: Progressing

## 2022-05-03 PROBLEM — Z51.5 HOSPICE CARE PATIENT: Status: ACTIVE | Noted: 2022-01-01

## 2022-05-03 PROBLEM — R06.89 HYPERCAPNIA: Status: ACTIVE | Noted: 2022-01-01

## 2022-05-03 PROBLEM — Z71.89 GOALS OF CARE, COUNSELING/DISCUSSION: Status: ACTIVE | Noted: 2022-01-01

## 2022-05-03 NOTE — ASSESSMENT & PLAN NOTE
EKG is his baseline , nonischemic due to hypertensive urgency as evidence by elevated troponins above the 99th percentile with no evidence of ischemia requiring EKG and trending of troponins with additional random troponin  Trop trended 179 - 211 - 226  Random trop  141  As noted above goals of care conversation was performed with patient's wife and goal is for comfort  Hospice consulted  Patient admitted to hospice

## 2022-05-03 NOTE — ASSESSMENT & PLAN NOTE
Patient had been admitted status post 3 falls at home, sustained an acute fracture of L1  Patient's wife indicated that after the 1st fall the patient demonstrated altered mental status changes, was not redirectable as he normally is  On morning of 5/3 patient was noted to have tachypnea, sonorous breathing  ABG revealed hypercapnia with pCO2 84, pH is 7 02  Discussed with wife at that time and she indicated that she was okay with a trial of BiPAP  Repeat ABG showed improvement with pCO2 47 and pH 7 325  Patient continued to be unresponsive to noxious stimuli  Wife is at bedside, she indicated that she did not wish to pursue further medical treatment as she felt this would not be her 's wishes  She was agreeable for hospice consultation, case management aware  Hospice evaluated the patient indicated that he is appropriate for general inpatient hospice care  Morphine 2 mg IV increased to 4 mg q 3 hours as patient continues with increased work of breathing  IV Ativan p r n  For agitation  Scopolamine patch ordered  Will also order Lidoderm patch for patient's lower back  Monitor patient's response and up titrate as necessary  Appreciate hospice recommendations  65

## 2022-05-03 NOTE — DEATH NOTE
INPATIENT DEATH NOTE  Jessi Matt 80 y o  male MRN: [id number]  Unit/Bed#: [address] [place] [address] Encounter: [id number]    Date, Time and Cause of Death    Date of Death: 5/3/22  Time of Death:  2:55 PM  Preliminary Cause of Death: Respiratory failure ([facility] [address] )  Entered by: TR Guillen[CG1 1]     Attribution     CG1  [address] [facility] Sulma Carbajal 22 15:51           Patient's Information  Pronounced by: Jordyn Carbajal  Did the patient's death occur in the ED?: No  Did the patient's death occur in the OR?: No  Did the patient's death occur less than 10 days post-op?: No  Did the patient's death occur within 24 hours of admission?: No  Was code status DNR at the time of death?: Yes    PHYSICAL EXAM:  Unresponsive to noxious stimuli, Spontaneous respirations absent, Breath sounds absent, Carotid pulse absent, Heart sounds absent, Pupillary light reflex absent and Corneal blink reflex absent    Medical Examiner notification criteria:  NONE APPLICABLE   Medical Examiner's office notified?:  No, does not meet ME notification criteria   Medical Examiner accepted case?:  No  Name of Medical Examiner: n/a    Family Notification  Was the family notified?: Yes  Date Notified: 22  Time Notified:   Notified by: Jordyn Lala, [address] [place] [address]  Name of Family Notified of Death: Gabbie Alexandertana and Lior Rea   Relationship to Patient: Spouse,Son  Family Notification Route: At bedside  Was the family told to contact a  home?: Yes  Name of [place] [id number][de-identified] [facility] in [place], [place]    Autopsy Options:  Post-mortem examination declined by next of kin    Primary Service Attending Physician notified?:  yes - Attending:  Benedict Lawrence MD    Physician/Resident responsible for completing Discharge Summary:  Jordyn Carbajal

## 2022-05-03 NOTE — ASSESSMENT & PLAN NOTE
Falls at home in the bathroom no LOC per family report  L1 fracture on ct  - consult to ns placed, appreciated recs they will fu in 2 weeks op; obtain baseline upright spine xray; will have repeat upright spine prior to being seen by outpatient neurosurgery  - TLSO brace  - pain control ordered but patient hasn't been utilizing it much   Round the clock Tylenol ordered for patient  As noted above patient's status deteriorating; goal of care conversation with wife was had and ultimate decision for comfort care

## 2022-05-03 NOTE — ASSESSMENT & PLAN NOTE
As noted above goals of care discussion was performed with patient's wife at the bedside  She wants the focus to be on comfort as she feels that her  would not want medical treatment if he was not going to be able to get back to his baseline  She is agreeable to consultation with Marcellus Rehman  Case management aware, liaison from [facility] met with patient's wife and son at the bedside  They indicated that patient is appropriate for general inpatient hospice care  Plan as noted above    Spiritual care consulted  Supportive care for family

## 2022-05-03 NOTE — PROGRESS NOTES
Patient was referred to me for spiritual care from the patient's Advanced Practitioner  I was not able to visit the patient due to being at the other hospital I serve  I offered to speak to the patient's spouse by phone but the spouse declined  I will continue to stay in touch with the patient's care providers should the spouse change her mind        05/03/22 1200   Clinical Encounter Type   Visited With Health care provider

## 2022-05-03 NOTE — DISCHARGE SUMMARY
Sherly 89  Discharge- [place] 4/12/1929, 80 y o  male MRN: [id number]  Unit/Bed#: [address] [place] [address] Encounter: [id number]  Primary Care Provider: Johnson Lowery DO   Date and time admitted to hospital: 4/30/2022  9:14 AM    * Goals of care, counseling/discussion  Assessment & Plan  Condition continues to deteriorate, not responsive to noxious stimuli  ABG showed hypercapnia with pCO2 84  Discussed with wife, okay with trial of BiPAP  PCO2 did improve to 44, however patient remains unresponsive  Wife at bedside, did discuss results of CT of head, MRI, ABGs, ammonia level  Wife indicated that the patient has been deteriorating over the course of the past year, has not been at his baseline for some time  In light of fracture and worsening mental status patient's wife decided against further medical treatment and wished to pursue comfort care  She is agreeable to consultation with hospice  Hospice met with patient's wife and son in the room, they are agreeable and have signed on with hospice  Spiritual care consulted  Supportive care for family  P r n  Morphine for increased work of breathing or pain, p r n  Ativan for agitation  Scopolamine patch ordered  Monitor patient's response and up titrate as necessary  Hypercapnia  Assessment & Plan  Hypercapnia as evidence by increased work of breathing, respiratory rate 30  ABG this morning showed pCO2 of 84  Previous ABG performed 5/2 showed pCO2 of 37  As noted above did a trial of BiPAP after discussion with wife, ultimately wife determined that she wanted to pursue comfort measures only  Patient currently is on room air with O2 sat 97%  Continues unresponsive  Hospice consulted, patient is appropriate for inpatient hospice care      Non-ischemic myocardial injury (non-traumatic)  Assessment & Plan  EKG is his baseline , nonischemic due to hypertensive urgency as evidence by elevated troponins above the 99th percentile with no evidence of ischemia requiring EKG and trending of troponins with additional random troponin  Trop trended 179 - 211 - 226  Random trop  141  As noted above goals of care conversation was performed with patient's wife and goal is for comfort  Hospice consulted  Patient admitted to hospice  Altered mental status  Assessment & Plan  · Wife reports 6 months history of declining mental status/ memory which waxes and wanes and he has spells" of confusion, lethargy, weakness which usually resolves in 1 day  He has been worked up multiple times for this  It sounds like a metabolic encephalopathy, dementia progression according to the way the wife describes it  · Pee underlying dementia which is never formally diagnosed  and now with acute pain, HTN and hospitalization he is delirious  · Restraints placed night of 5/1  · Started seroquel 25 mg qhs   · Psych consulted; follow-up on results  5/2 spoke at length with wife at bedside, she indicated that the patient has never been this delirious before  Wife indicated that patient's speech is normally clear, able to eat and drink without any difficulties, ambulates with a walker at home and interacts with family appropriately  CT scan of head performed when patient came in showed chronic lacunar infarctions, no change from previous CT  Microangiopathic changes  Worsening right maxillory-sinus disease  Ordered ceftriaxone for right maxillory- sinus disease      Ammonia level ordered, less than 10  ABG within normal limits  MRI of brain  showed no acute infarct    5/3 patient noted to be unresponsive to noxious stimuli, repeat ABG showed pCO2 84  As noted above discussed with wife okay for trial with BiPAP, ultimately wife decided that she does not wish to pursue medical treatment and focus on comfort  Hospice consulted, patient appropriate for in hospice care      Closed fracture of first lumbar vertebra [facility])  Assessment & Plan  Falls at home in the bathroom no LOC per family report  L1 fracture on ct  - consult to ns placed, appreciated recs they will fu in 2 weeks op; obtain baseline upright spine xray; will have repeat upright spine prior to being seen by outpatient neurosurgery  - TLSO brace  - pain control ordered but patient hasn't been utilizing it much  Round the clock Tylenol ordered for patient  As noted above patient's status deteriorating; goal of care conversation with wife was had and ultimate decision for comfort care    Essential hypertension  Assessment & Plan  BP initially was elevated, currently hypotensive did receive IV bolus with minimal improvement  As noted above goals of care was discussed with patient's wife at the bedside, focus on comfort care and no further medical management  Hospice consulted, appropriate for inpatient hospice  Diabetes mellitus type 2  Assessment & Plan  Lab Results   Component Value Date    HGBA1C 7 1 (H) 04/30/2022       Recent Labs     05/02/22  2110 05/03/22  0719 05/03/22  0831 05/03/22  0912   POCGLU 163* 237* 251* 242*       Blood Sugar Average: Last 72 hrs:  (P) 720 [id number]     Will stop Accu-Cheks and insulin coverage as patient is accepted to hospice care        Discharging Physician / Practitioner: Karyle Passer, CRNP  PCP: Angie Camara DO  Admission Date: 4/30/2022  Discharge Date: 05/03/22    Reason for Admission: Fall (Pt brought by EMS  Per report pt has fallen 3 times in the last couple hours  First two falls pt was placed back in bed by EMS    Per wife on the third fall pts knees buckled and he fell on floor, was laying there for a couple hours before she called 911)        Medical Problems             Resolved Problems  Date Reviewed: 5/3/2022    None                Consultations During Hospital Stay:  IP CONSULT TO NEUROSURGERY  IP CONSULT TO PSYCHIATRY  IP CONSULT TO HOSPICE  IP CONSULT TO PASTORAL CARE    Procedures Performed:     None     Significant Findings / Test Results:     · CT head performed 4/30 showed no acute intracranial abnormality, microangiopathic changes, worsening right maxillary sinus disease as per radiology report  · CT of abdomen and pelvis performed on 04/30 showed acute L1 fracture, 1 cm right pericardial effusion, multiple pancreatic cysts as per radiology report  · CT of cervical spine performed 4/30 shows no cervical spine fracture or traumatic malalignment as per radiology report  · Chest x-ray performed on 05/01 showed mild pulmonary vascular congestion, otherwise no acute abnormality in chest as per radiology report  · MRI of brain performed 5/2 showed no acute intracranial abnormality, in particular no acute ischemia, moderate chronic microangiopathic change and chronic appearing right maxillary sinus disease as per radiology report  · Repeat chest x-ray performed on 05/03 showed no acute cardiopulmonary findings      Results from last 7 days   Lab Units 05/03/22  0828 05/02/22  0810 04/30/22  0932   WBC Thousand/uL 13 70* 10 49* 10 40*   HEMOGLOBIN g/dL 11 5* 11 3* 12 6   PLATELETS Thousands/uL 283 213 243     Results from last 7 days   Lab Units 05/03/22  0828 05/02/22  0810 04/30/22  0932   SODIUM mmol/L 143 142 141   POTASSIUM mmol/L 4 2 3 2* 3 6   CHLORIDE mmol/L 107 106 105   CO2 mmol/L 28 25 26   BUN mg/dL 25 23 23   CREATININE mg/dL 1 63* 1 43* 1 37*   CALCIUM mg/dL 9 1 9 5 10 0   TOTAL BILIRUBIN mg/dL  --   --  0 66   ALK PHOS U/L  --   --  89   ALT U/L  --   --  23   AST U/L  --   --  21     Results from last 7 days   Lab Units 04/30/22  0932   INR  0 96         Lab Results   Component Value Date/Time    HGBA1C 7 1 (H) 04/30/2022 04:52 PM     Results from last 7 days   Lab Units 05/03/22  0912 05/03/22  0831 05/03/22  0719 05/02/22  2110 05/02/22  1826 05/02/22  1612 05/02/22  1440 05/02/22  1052 05/02/22  0901 05/02/22  0707 05/01/22  2039 05/01/22  1752   POC GLUCOSE mg/dl 242* 251* 237* 163* 196* 141* 172* 149* 138 144* 121 174* Results from last 7 days   Lab Units 04/30/22  0940 04/30/22  0932   LACTIC ACID mmol/L 1 0  --    PROCALCITONIN ng/ml  --  0 10     Blood Culture:   Lab Results   Component Value Date    BLOODCX No Growth at 48 hrs  04/30/2022    BLOODCX No Growth at 48 hrs  04/30/2022     Urine Culture: No results found for: URINECX  Sputum Culture: No components found for: SPUTUMCX  Wound Culture: No results found for: WOUNDCULT     XR chest portable   Final Result by Lavon Good MD (05/03 1221)   No acute cardiopulmonary findings  Workstation performed: [id number]         MRI brain wo contrast   Final Result by Mundo Carr DO (05/02 1631)      No acute intracranial abnormality  In particular no acute ischemia  Moderate chronic microangiopathic change  Chronic appearing right maxillary sinus disease  Workstation performed: [id number]         XR chest 1 view   ED Interpretation by Natalia Warner PA-C (04/30 1125)   Multifocal infiltrates - consider aspiration vs edema vs covid      Final Result by Jj Olmedo MD (05/01 6697)      Mild pulmonary vascular congestion  Otherwise, no acute abnormality in the chest                   Workstation performed: [id number]         CT spine cervical wo contrast   Final Result by Worley Hamman, MD (04/30 1116)      No cervical spine fracture or traumatic malalignment  Workstation performed: [id number]         CT abdomen pelvis wo contrast   Final Result by Worley Hamman, MD (04/30 1131)      1  Acute L1 fracture  2   1 cm right pericardial effusion  3   Multiple pancreatic cysts, the dominant one is larger than previous  A nonemergent pancreatic protocol CT or MRI recommended  The study was marked in [facility] for immediate notification                 Workstation performed: [id number]         CT head without contrast   Final Result by Worley Hamman, MD (04/30 1106)      No acute intracranial abnormality  Microangiopathic changes  Worsening right maxillary sinus disease  Workstation performed: [id number]                Incidental Findings:   · CT demonstrated acute L1 fracture, 1 cm right pericardial effusion, multiple pancreatic cysts  Test Results Pending at Discharge (will require follow up): · None     Outpatient Tests Requested:  · None    Complications:  Hypercapnia, deterioration of condition; goals of care discussion leading to family decision to pursue comfort care/hospice consultation     Reason for Admission:   Chief Complaint   Patient presents with    Fall     Pt brought by EMS  Per report pt has fallen 3 times in the last couple hours  First two falls pt was placed back in bed by EMS  Per wife on the third fall pts knees buckled and he fell on floor, was laying there for a couple hours before she called [facility] Course:     Per HPI: Sabra Palacios is a 80 y o  male patient with a PMH of diabetes, cervical neck fracture, hypertension, prostate cancer who originally presented to the hospital on 4/30/2022 due to multiple falls  Wife reported that patient was in his normal state of health, working with physical therapy since his last hospitalization  Day prior to presentation he had 3 falls  The 1st time he fell while trying to get up from the toilet, requiring assistance from their son to help him get up  The 2nd fall patient fell out of bed, EMS was summoned and they assisted patient back into the bed  Third time the patient fell and EMS was summoned once again and patient was brought to the ER for further evaluation and treatment  In the ER patient was found to have significantly elevated blood pressure as well as an L1 fracture  Neurosurgery was consulted, reviewed images and recommended TLSO brace when out of bed    Wife reported that over the last 6 months to a year the patient has been having spells where she is concerned about his mental state where he has days that he is staring off into space and not doing well  She reported that when he gets a good night sleep after the spells than he goes back to his normal   She stated that after the 1st fall he was not very directable which is not like him  CT of the head was performed which showed no acute intracranial abnormality  Patient continued with altered mental status changes, metabolic workup initially was unremarkable  On morning of 5/3 patient noted was to have increased work of breathing, tachypnea, sonorous breathing and was noted to be unresponsive to noxious stimuli  ABG was performed, demonstrated pCO2 of 84, pH 7 02  Discussed patient's status with wife, initially was agreeable for a trial with BiPAP  Repeat ABG demonstrated a decrease in pCO2 to 42 2, and pH increased to 7 328  Patient remained unresponsive to noxious stimuli, wife is at bedside and goals of care were discussed  The wife indicated that the patient had been failing over the course of the past year, has not really been at baseline and felt that he would not want to continue medical treatment if he was not able to interact with family and continue to do his activities at home  Patient's wife indicated that she wished to pursue comfort care, discussed hospice with her and she is agreeable to consultation with hospice  [address] Rehman evaluated the patient and determined that he is appropriate for inpatient hospice  Patient's wife and son were at the bed side when hospice evaluated the patient and are aware of patient's acceptance to hospice care  Hospital Course:    Please see above list of diagnoses and related plan for additional information  Condition at Discharge: poor       Discharge Day Visit / Exam:     Subjective:  Patient unresponsive to noxious stimuli, noted brow to be furrowed, appears to be having discomfort      Vitals: Blood Pressure: 110/50 (05/03/22 1310)  Pulse: 88 (05/03/22 1310)  Temperature: (!) 96 8 °F (36 °C) (05/03/22 1310)  Temp Source: Axillary (05/03/22 1310)  Respirations: 16 (05/03/22 1310)  Height: 6' 1" (185 4 cm) (05/01/22 7322)  Weight - Scale: 75 1 kg (165 lb 9 1 oz) (05/03/22 0600)  SpO2: 97 % (05/03/22 1310)     Exam:   Physical Exam  Vitals and nursing note reviewed  Constitutional:       General: He is in acute distress  Appearance: He is toxic-appearing  HENT:      Head: Normocephalic  Nose: Nose normal       Mouth/Throat:      Mouth: Mucous membranes are dry  Eyes:      Comments: Not opening eyes to command; pupils pinpoint, reactive and equal in size   Cardiovascular:      Rate and Rhythm: Normal rate  Heart sounds: Murmur heard  Pulmonary:      Comments: Coarse rhonchi noted; sonorous breathing noted  Irregular breathing pattern noted  Abdominal:      General: There is no distension  Palpations: Abdomen is soft  Comments: Hypoactive bowel sounds    Genitourinary:     Comments: Straight cathed patient,   Musculoskeletal:      Right lower leg: No edema  Left lower leg: No edema  Comments: deconditioned   Skin:     General: Skin is dry  Capillary Refill: Capillary refill takes 2 to 3 seconds  Coloration: Skin is pale  Neurological:      Comments: Unresponsive to any stimuli, not following any commands; does not open eyes when spoken to    Psychiatric:      Comments: Brow furrowed, appears in discomfort           Discharge instructions/Information to patient and family:   See after visit summary for information provided to patient and family  Provisions for Follow-Up Care:  See after visit summary for information related to follow-up care and any pertinent home health orders  Disposition:     Other: [address]; appropriate for in patient care     Planned Readmission: Yes; readmit as hospice patient      Discharge Statement:  I spent greater than 30 minutes discharging the patient  This time was spent on the day of discharge  I had direct contact with the patient on the day of discharge  Greater than 50% of the total time was spent examining patient, answering all patient questions, arranging and discussing plan of care with patient as well as directly providing post-discharge instructions  Additional time then spent on discharge activities  Discharge Medications:  See after visit summary for reconciled discharge medications provided to patient and family        ** Please Note: This note has been constructed using a voice recognition system **

## 2022-05-03 NOTE — ASSESSMENT & PLAN NOTE
Lab Results   Component Value Date    HGBA1C 7 1 (H) 04/30/2022       Recent Labs     05/02/22  2110 05/03/22  0719 05/03/22  0831 05/03/22  0912   POCGLU 163* 237* 251* 242*       Blood Sugar Average: Last 72 hrs:  (P) 395 [id number]     Will stop Accu-Cheks and insulin coverage as patient is accepted to hospice care

## 2022-05-03 NOTE — H&P
[address] 4/12/1929, 80 y o  male MRN: [id number]  Unit/Bed#: [address] Encounter: [id number]  Primary Care Provider: Zainab Feliciano DO   Date and time admitted to hospital: 5/3/2022  2:04 PM    * Hospice care patient  Assessment & Plan  Patient had been admitted status post 3 falls at home, sustained an acute fracture of L1  Patient's wife indicated that after the 1st fall the patient demonstrated altered mental status changes, was not redirectable as he normally is  On morning of 5/3 patient was noted to have tachypnea, sonorous breathing  ABG revealed hypercapnia with pCO2 84, pH is 7 02  Discussed with wife at that time and she indicated that she was okay with a trial of BiPAP  Repeat ABG showed improvement with pCO2 47 and pH 7 325  Patient continued to be unresponsive to noxious stimuli  Wife is at bedside, she indicated that she did not wish to pursue further medical treatment as she felt this would not be her 's wishes  She was agreeable for hospice consultation, case management aware  Hospice evaluated the patient indicated that he is appropriate for general inpatient hospice care  Morphine 2 mg IV increased to 4 mg q 3 hours as patient continues with increased work of breathing  IV Ativan p r n  For agitation  Scopolamine patch ordered  Will also order Lidoderm patch for patient's lower back  Monitor patient's response and up titrate as necessary  Appreciate hospice recommendations  Goals of care, counseling/discussion  Assessment & Plan  As noted above goals of care discussion was performed with patient's wife at the bedside  She wants the focus to be on comfort as she feels that her  would not want medical treatment if he was not going to be able to get back to his baseline  She is agreeable to consultation with [facility] [facility] Rehman    Case management aware, liaison from [facility] met with patient's wife and son at the bedside  They indicated that patient is appropriate for general inpatient hospice care  Plan as noted above  Spiritual care consulted  Supportive care for family      Altered mental status  Assessment & Plan  Patient continues with altered mental status changes  CT of head negative for any acute intracranial abnormalities, MRI was performed on 5/2, no acute infarct  Patient remains unresponsive to noxious stimuli  As noted above discussion with patient's wife at the bedside indicated that she has noticed a decline in her 's abilities over the past year, was having periods of staring out into space, "not himself  As noted above patient's wife indicated that she does not wish to pursue any further medical treatment and would prefer to focus on comfort care/hospice care  Closed fracture of first lumbar vertebra (HCC)  Assessment & Plan  Pain control  IV morphine  Lidoderm patch      VTE Prophylaxis: hospice patient  / reason for no mechanical VTE prophylaxis hospice patient    Code Status: Level 4 - Comfort Care    Anticipated Length of Stay:  Patient will be admitted on an Hospice basis with an anticipated length of stay of  Greater than 2 midnights  Justification for Hospital Stay: Hospice care     Total Time for Visit, including Counseling / Coordination of Care: 1 hour  Greater than 50% of this total time spent on direct patient counseling and coordination of care  Chief Complaint:   No chief complaint on file  History of Present Illness:    Jody Clement is a 80 y o  male with a PMH of these, cervical neck fracture, hypertension and prostate cancer who presents with initially on 04/30/2022 due to multiple falls  Patient's mental status continued to deteriorate over the course of hospitalization, etiology uncertain in spite of metabolic and neurologic workup  On morning of 5/3 patient was noted to have hypercarbia with pCO2 of 84    Wife was agreeable to a trial of BiPAP, patient's pCO2 did improved to 47, however goals of care conversation was had with the wife, she indicated that she did not think that her  would want medical treatment as he was incapacitated mentally and unable to interact with family  She wished to pursue comfort care and was agreeable to hospice consultation  Hospice is consulted, they evaluated the patient and indicated that he is appropriate for general inpatient hospice  Morphine IV p r n  For increased work of breathing or pain, Ativan IV for agitation  Scopolamine patch is ordered for patient also  Lidoderm patch for patient back  Spiritual Care is consulted for support of family  This was discussed with the patient's wife and their son Aldo Velazquez at the bedside, they verbalized understanding and are agreeable to the plan  Review of Systems:    Review of Systems   Unable to perform ROS: Patient nonverbal       Past Medical and Surgical History:     Past Medical History:   Diagnosis Date    HLD (hyperlipidemia)     HTN (hypertension)     Prostate CA ([address] )     Skin cancer        Past Surgical History:   Procedure Laterality Date    APPENDECTOMY      CYSTOSCOPY      INGUINAL HERNIA REPAIR      THYROID SURGERY      possible cyst removal        Meds/Allergies:    Prior to Admission medications    Medication Sig Start Date End Date Taking?  Authorizing Provider   amLODIPine (NORVASC) 10 mg tablet Take 10 mg by mouth daily 4/26/21   Historical Provider, MD   aspirin 81 mg chewable tablet Chew 81 mg daily    Historical Provider, MD   atorvastatin (LIPITOR) 20 mg tablet Take 20 mg by mouth daily 3/31/21   Historical Provider, MD   irbesartan (AVAPRO) 150 mg tablet Take 150 mg by mouth daily at bedtime    Historical Provider, MD   lidocaine (LIDODERM) 5 % Apply 1 patch topically daily for 10 days Remove & Discard patch within 12 hours or as directed by MD 12/18/21 12/28/21  Elin Anderson, DO   Melatonin 10 MG CAPS Take 10 mg by mouth    Historical Provider, MD metoprolol tartrate (LOPRESSOR) 50 mg tablet Take 50 mg by mouth every 12 (twelve) hours   3/6/21   Historical Provider, MD   sitaGLIPtin (JANUVIA) 100 mg tablet Take 100 mg by mouth daily    Historical Provider, MD   tamsulosin (FLOMAX) 0 4 mg TAKE 1 CAPSULE BY MOUTH EVERY DAY 1/2 HR  FOLLOWING THE SAME MEAL EACH DAY 3/24/21   Historical Provider, MD       Allergies: No Known Allergies    Social History:     Marital Status: /Civil Union   Substance Use History:   Social History     Substance and Sexual Activity   Alcohol Use Not Currently     Social History     Tobacco Use   Smoking Status Never Smoker   Smokeless Tobacco Never Used     Social History     Substance and Sexual Activity   Drug Use Never       Family History:    No family history on file  Physical Exam:     Vitals:        Physical Exam  Vitals and nursing note reviewed  Constitutional:       General: He is in acute distress  Appearance: He is toxic-appearing  HENT:      Head: Normocephalic  Mouth/Throat:      Mouth: Mucous membranes are dry  Eyes:      Comments: Pupils pinpoint   Cardiovascular:      Rate and Rhythm: Normal rate  Comments: Pulses weak  Pulmonary:      Breath sounds: Rhonchi present  Comments: Irregular breathing pattern; sonorous  Abdominal:      Comments: Hypoactive bowel sounds    Genitourinary:     Comments: No urine noted  Musculoskeletal:      Comments: Deconditioned    Skin:     Coloration: Skin is pale  Comments: Cool to touch   Neurological:      Comments: Obtunded    Psychiatric:      Comments: Unresponsive           Additional Data:     Lab Results: Hospice patient    Results from last 7 days   Lab Units 05/03/22  0828 05/02/22  0810 04/30/22  0932   WBC Thousand/uL 13 70*   < > 10 40*   HEMOGLOBIN g/dL 11 5*   < > 12 6   HEMATOCRIT % 34 5*   < > 37 7   PLATELETS Thousands/uL 283   < > 243   NEUTROS PCT %  --   --  78*    < > = values in this interval not displayed       Results from last 7 days   Lab Units 05/03/22  0828 05/02/22  0810 04/30/22  0932   SODIUM mmol/L 143   < > 141   POTASSIUM mmol/L 4 2   < > 3 6   CHLORIDE mmol/L 107   < > 105   CO2 mmol/L 28   < > 26   BUN mg/dL 25   < > 23   CREATININE mg/dL 1 63*   < > 1 37*   CALCIUM mg/dL 9 1   < > 10 0   TOTAL BILIRUBIN mg/dL  --   --  0 66   ALK PHOS U/L  --   --  89   ALT U/L  --   --  23   AST U/L  --   --  21    < > = values in this interval not displayed  Results from last 7 days   Lab Units 04/30/22  0932   INR  0 96         Results from last 7 days   Lab Units 05/03/22  0912   POC GLUCOSE mg/dl 242*     Results from last 7 days   Lab Units 04/30/22  0940   LACTIC ACID mmol/L 1 0       Imaging: Hospice patient    No orders to display       No orders to display       EKG, Pathology, and Other Studies Reviewed on Admission:   · EKG:  None    Allscripts / Epic Records Reviewed: Yes     ** Please Note: This note has been constructed using a voice recognition system   **

## 2022-05-03 NOTE — DISCHARGE SUMMARY
Lina 45  Discharge- [facility] 1929, 80 y o  male MRN: [id number]  Unit/Bed#: [address] [address] Encounter: [id number]  Primary Care Provider: Autumn Banks DO   Date and time admitted to hospital: 5/3/2022  2:04 PM    * Hospice care patient  Assessment & Plan  Patient had been admitted status post 3 falls at home, sustained an acute fracture of L1  Patient's wife indicated that after the 1st fall the patient demonstrated altered mental status changes, was not redirectable as he normally is  On morning of 5/3 patient was noted to have tachypnea, sonorous breathing  ABG revealed hypercapnia with pCO2 84, pH is 7 02  Discussed with wife at that time and she indicated that she was okay with a trial of BiPAP  Repeat ABG showed improvement with pCO2 47 and pH 7 325  Patient continued to be unresponsive to noxious stimuli  Wife is at bedside, she indicated that she did not wish to pursue further medical treatment as she felt this would not be her 's wishes  She was agreeable for hospice consultation, case management aware  Hospice evaluated the patient indicated that he is appropriate for general inpatient hospice care  Morphine 2 mg IV increased to 4 mg q 3 hours as patient continues with increased work of breathing  IV Ativan p r n  For agitation  Scopolamine patch ordered  Patient  at 2:55 p m  With family at the bedside  Goals of care, counseling/discussion  Assessment & Plan  As noted above goals of care discussion was performed with patient's wife at the bedside  She wants the focus to be on comfort as she feels that her  would not want medical treatment if he was not going to be able to get back to his baseline  She is agreeable to consultation with [facility] [facility] Rehman  Case management aware, liaison from [facility] met with patient's wife and son at the bedside    They indicated that patient is appropriate for general inpatient hospice care  Plan as noted above  Spiritual care consulted  Supportive care for family  Patient  at 2:55 p m  With family at bedside      Altered mental status  Assessment & Plan  Patient continues with altered mental status changes  CT of head negative for any acute intracranial abnormalities, MRI was performed on , no acute infarct  Patient remains unresponsive to noxious stimuli  As noted above discussion with patient's wife at the bedside indicated that she has noticed a decline in her 's abilities over the past year, was having periods of staring out into space, "not himself  As noted above patient's wife indicated that she does not wish to pursue any further medical treatment and would prefer to focus on comfort care/hospice care  Patient  at 2:55 p m  With family at bedside    Closed fracture of first lumbar vertebra [facility])  Assessment & Plan  As noted above patient  at 2:55 p m  With family at bedside        Discharging Physician / Practitioner: TR Vanessa  PCP: Viki Pablo DO  Admission Date: 5/3/2022  Discharge Date: 22    Reason for Admission: No chief complaint on file          Medical Problems             Resolved Problems  Date Reviewed: 5/3/2022    None                Consultations During Hospital Stay:  Hospice consult    Procedures Performed:     · None    Significant Findings / Test Results:     · None    Results from last 7 days   Lab Units 22  0828 22  0810 22  0932   WBC Thousand/uL 13 70* 10 49* 10 40*   HEMOGLOBIN g/dL 11 5* 11 3* 12 6   PLATELETS Thousands/uL 283 213 243     Results from last 7 days   Lab Units 22  0828 22  0810 22  0932   SODIUM mmol/L 143 142 141   POTASSIUM mmol/L 4 2 3 2* 3 6   CHLORIDE mmol/L 107 106 105   CO2 mmol/L 28 25 26   BUN mg/dL 25 23 23   CREATININE mg/dL 1 63* 1 43* 1 37*   CALCIUM mg/dL 9 1 9 5 10 0   TOTAL BILIRUBIN mg/dL  --   --  0 66   ALK PHOS U/L  -- --  89   ALT U/L  --   --  23   AST U/L  --   --  21     Results from last 7 days   Lab Units 04/30/22  0932   INR  0 96         Lab Results   Component Value Date/Time    HGBA1C 7 1 (H) 04/30/2022 04:52 PM     Results from last 7 days   Lab Units 05/03/22  0912 05/03/22  0831 05/03/22  0719 05/02/22  2110 05/02/22  1826 05/02/22  1612 05/02/22  1440 05/02/22  1052 05/02/22  0901 05/02/22  0707 05/01/22  2039 05/01/22  1752   POC GLUCOSE mg/dl 242* 251* 237* 163* 196* 141* 172* 149* 138 144* 121 174*     Results from last 7 days   Lab Units 04/30/22  0940 04/30/22  0932   LACTIC ACID mmol/L 1 0  --    PROCALCITONIN ng/ml  --  0 10     Blood Culture:   Lab Results   Component Value Date    BLOODCX No Growth at 72 hrs  04/30/2022    BLOODCX No Growth at 72 hrs  04/30/2022     Urine Culture: No results found for: URINECX  Sputum Culture: No components found for: SPUTUMCX  Wound Culture: No results found for: WOUNDCULT     No orders to display          Incidental Findings:   · None     Test Results Pending at Discharge (will require follow up): · None     Outpatient Tests Requested:  · None    Complications:  None    Reason for Admission: No chief complaint on file  Hospital Course:     Per HPI: Linden Gonzalez is a 80 y o  male patient with a PMH of diabetes, cervical neck fracture, hypertension and prostate cancer who originally presented to the hospital on 5/3/2022 due to transition to hospice care  Patient initially had presented on 04/30/2022 due to multiple falls  During course of hospitalization patient's mental status continued to deteriorate, wife indicated that patient had not been at his baseline since the 1st fall  As noted in previous discharge summary workup was performed with no clear etiology of patient's decline  On morning of 5/3 patient noted to have hypercarbia, trialed on BiPAP with some improvement of ABG, however remained unresponsive to noxious stimuli    Goals of care conversation was performed with wife, she had indicated that she wished to pursue comfort care and was agreeable to hospice consultation  Hospice saw the patient indicated he was appropriate for inpatient hospice care  He was started on IV morphine and IV Ativan, scopolamine patch  Family was at the bedside when the patient  at 2:55 p m  Supportive care for family  The indicated that they will be using [facility] in [place] [address]  They declined an autopsy  Discussed with nursing  Hospital Course:    Please see above list of diagnoses and related plan for additional information  Condition at Discharge:        Discharge Day Visit / Exam:     Subjective:  Unresponsive  Vitals:    Exam:   Physical Exam  Vitals and nursing note reviewed  Constitutional:       Comments: Patient unresponsive absence of breath noted   HENT:      Mouth/Throat:      Mouth: Mucous membranes are dry  Eyes:      Comments: No pupillary response, no corneal reflex   Cardiovascular:      Comments: Heart tones absent, no pulses present  Pulmonary:      Comments: Breath sounds absent  Abdominal:      Comments: Hypoactive bowel sound   Musculoskeletal:      Comments: No movement   Skin:     Capillary Refill: Nail beds pale     Coloration: Skin is pale  Comments: Cool to touch   Neurological:      Comments: Unresponsive   Psychiatric:      Comments: Unresponsive           Discharge instructions/Information to patient and family:   See after visit summary for information provided to patient and family  Provisions for Follow-Up Care:  See after visit summary for information related to follow-up care and any pertinent home health orders  Disposition:     Other: Patient     Planned Readmission:  No     Discharge Statement:  I spent greater than 30 minutes discharging the patient  This time was spent on the day of discharge  I had direct contact with the patient on the day of discharge   Greater than 50% of the total time was spent examining patient, answering all patient questions, arranging and discussing plan of care with patient as well as directly providing post-discharge instructions  Additional time then spent on discharge activities  Discharge Medications:  See after visit summary for reconciled discharge medications provided to patient and family        ** Please Note: This note has been constructed using a voice recognition system **

## 2022-05-03 NOTE — PLAN OF CARE
Problem: Potential for Falls  Goal: Patient will remain free of falls  Description: INTERVENTIONS:  - Educate patient/family on patient safety including physical limitations  - Instruct patient to call for assistance with activity   - Consult OT/PT to assist with strengthening/mobility   - Keep Call bell within reach  - Keep bed low and locked with side rails adjusted as appropriate  - Keep care items and personal belongings within reach  - Initiate and maintain comfort rounds  - Make Fall Risk Sign visible to staff  - Offer Toileting every 2 Hours, in advance of need  - Initiate/Maintain bed alarm  - Obtain necessary fall risk management equipment: yes  - Apply yellow socks and bracelet for high fall risk patients  - Consider moving patient to room near nurses station  Outcome: Progressing     Problem: MOBILITY - ADULT  Goal: Maintain or return to baseline ADL function  Description: INTERVENTIONS:  -  Assess patient's ability to carry out ADLs; assess patient's baseline for ADL function and identify physical deficits which impact ability to perform ADLs (bathing, care of mouth/teeth, toileting, grooming, dressing, etc )  - Assess/evaluate cause of self-care deficits   - Assess range of motion  - Assess patient's mobility; develop plan if impaired  - Assess patient's need for assistive devices and provide as appropriate  - Encourage maximum independence but intervene and supervise when necessary  - Involve family in performance of ADLs  - Assess for home care needs following discharge   - Consider OT consult to assist with ADL evaluation and planning for discharge  - Provide patient education as appropriate  Outcome: Progressing  Goal: Maintains/Returns to pre admission functional level  Description: INTERVENTIONS:  - Perform BMAT or MOVE assessment daily    - Set and communicate daily mobility goal to care team and patient/family/caregiver     - Collaborate with rehabilitation services on mobility goals if consulted  - Perform Range of Motion 3 times a day  - Reposition patient every 2 hours    - Dangle patient 2 times a day  - Stand patient 1 times a day  - Ambulate patient 1 times a day  - Out of bed to chair 1 times a day   - Out of bed for meals 2 times a day  - Out of bed for toileting  - Record patient progress and toleration of activity level   Outcome: Progressing     Problem: Prexisting or High Potential for Compromised Skin Integrity  Goal: Skin integrity is maintained or improved  Description: INTERVENTIONS:  - Identify patients at risk for skin breakdown  - Assess and monitor skin integrity  - Assess and monitor nutrition and hydration status  - Monitor labs   - Assess for incontinence   - Turn and reposition patient  - Assist with mobility/ambulation  - Relieve pressure over bony prominences  - Avoid friction and shearing  - Provide appropriate hygiene as needed including keeping skin clean and dry  - Evaluate need for skin moisturizer/barrier cream  - Collaborate with interdisciplinary team   - Patient/family teaching  - Consider wound care consult   Outcome: Progressing     Problem: DISCHARGE PLANNING  Goal: Discharge to home or other facility with appropriate resources  Description: INTERVENTIONS:  - Identify barriers to discharge w/patient and caregiver  - Arrange for needed discharge resources and transportation as appropriate  - Identify discharge learning needs (meds, wound care, etc )  - Arrange for interpretive services to assist at discharge as needed  - Refer to Case Management Department for coordinating discharge planning if the patient needs post-hospital services based on physician/advanced practitioner order or complex needs related to functional status, cognitive ability, or social support system  Outcome: Progressing     Problem: Knowledge Deficit  Goal: Patient/family/caregiver demonstrates understanding of disease process, treatment plan, medications, and discharge instructions  Description: Complete learning assessment and assess knowledge base  Interventions:  - Provide teaching at level of understanding  - Provide teaching via preferred learning methods  Outcome: Progressing     Problem: CONFUSION/THOUGHT DISTURBANCE  Goal: Thought disturbances (confusion, delirium, depression, dementia or psychosis) are managed to maintain or return to baseline mental status and functional level  Description: INTERVENTIONS:  - Assess for possible contributors to  thought disturbance, including but not limited to medications, infection, impaired vision or hearing, underlying metabolic abnormalities, dehydration, respiratory compromise,  psychiatric diagnoses and notify attending PHYSICAN/AP  - Monitor and intervene to maintain adequate nutrition, hydration, elimination, sleep and activity  - Decrease environmental stimuli, including noise as appropriate  - Provide frequent contacts to provide refocusing, direction and reassurance as needed  Approach patient calmly with eye contact and at their level  - [place] high risk fall precautions, aspiration precautions and other safety measures, as indicated  - If delirium suspected, notify physician/AP of change in condition and request immediate in-person evaluation  - Pursue consults as appropriate including Geriatric (campus dependent), OT for cognitive evaluation/activity planning, psychiatric, pastoral care, etc   Outcome: Progressing     Problem: Nutrition/Hydration-ADULT  Goal: Nutrient/Hydration intake appropriate for improving, restoring or maintaining nutritional needs  Description: Monitor and assess patient's nutrition/hydration status for malnutrition  Collaborate with interdisciplinary team and initiate plan and interventions as ordered  Monitor patient's weight and dietary intake as ordered or per policy  Utilize nutrition screening tool and intervene as necessary   Determine patient's food preferences and provide high-protein, high-caloric foods as appropriate       INTERVENTIONS:  - Monitor oral intake, urinary output, labs, and treatment plans  - Assess nutrition and hydration status and recommend course of action  - Evaluate amount of meals eaten  - Assist patient with eating if necessary   - Allow adequate time for meals  - Recommend/ encourage appropriate diets, oral nutritional supplements, and vitamin/mineral supplements  - Order, calculate, and assess calorie counts as needed  - Recommend, monitor, and adjust tube feedings and TPN/PPN based on assessed needs  - Assess need for intravenous fluids  - Provide specific nutrition/hydration education as appropriate  - Include patient/family/caregiver in decisions related to nutrition  Outcome: Progressing

## 2022-05-03 NOTE — ASSESSMENT & PLAN NOTE
Patient continues with altered mental status changes  CT of head negative for any acute intracranial abnormalities, MRI was performed on , no acute infarct  Patient remains unresponsive to noxious stimuli  As noted above discussion with patient's wife at the bedside indicated that she has noticed a decline in her 's abilities over the past year, was having periods of staring out into space, "not himself  As noted above patient's wife indicated that she does not wish to pursue any further medical treatment and would prefer to focus on comfort care/hospice care  Patient  at 2:55 p m   With family at bedside

## 2022-05-03 NOTE — ASSESSMENT & PLAN NOTE
Condition continues to deteriorate, not responsive to noxious stimuli  ABG showed hypercapnia with pCO2 84  Discussed with wife, okay with trial of BiPAP  PCO2 did improve to 44, however patient remains unresponsive  Wife at bedside, did discuss results of CT of head, MRI, ABGs, ammonia level  Wife indicated that the patient has been deteriorating over the course of the past year, has not been at his baseline for some time  In light of fracture and worsening mental status patient's wife decided against further medical treatment and wished to pursue comfort care  She is agreeable to consultation with hospice  Hospice met with patient's wife and son in the room, they are agreeable and have signed on with hospice  Spiritual care consulted  Supportive care for family  P r n  Morphine for increased work of breathing or pain, p r n  Ativan for agitation  Scopolamine patch ordered  Monitor patient's response and up titrate as necessary

## 2022-05-03 NOTE — RAPID RESPONSE
Rapid Response Note  Shelton Matos 80 y o  male MRN: [id number]  Unit/Bed#: [address] [place] [address] Encounter: [id number]    Rapid Response Notification(s):   Response called date/time:  5/3/2022 9:11 AM  Response team arrival date/time:  5/3/2022 9:15 AM  Response end date/time:  5/3/2022 9:30 AM  [facility]  [facility]  Primary reason for rapid response call:  Acute change in BP    Rapid Response Intervention(s):   Airway: Other (comment) (BiPAP BiPAP)  Breathing:  None  Circulation:  None  Fluids administered:  Normal saline  Medications administered:  None       Assessment:   · Acute hypercarbic respiratory failure  · Acute encephalopathy  · Hypotension    Plan:   · Placed on BiPAP after ABG results showed CO2 of 84  · Patient received 1 L normal saline for hypotension  · Gerardo Cid has been speaking with the wife this morning, she will call back regarding goals of care since patient is already a DNR/DNI     Rapid Response Outcome:   Transfer:  Remain on floor  Primary service notified of transfer: No    Code Status: Level 3 (DNAR and DNI)    Comments:  Leia Jesus from [facility] to update wife and recommend transitioning to comfort care/hospice     Family notified of transfer: no - not transfered  Family member contacted: wife Ciera Aguilar called by [facility] TR      Background/Situation:   Shelton Matos is a 80 y o  male who has a past medical history of diabetes, cervical neck fracture, hypertension, and prostate cancer who had reports of multiple falls  He presented to the hospital on 04/30/2022 with altered mental status  Per the wife, it sounds like the patient has had worsening mental status over the last 6 months  During his hospitalization, the patient was found to have a fracture of his lumbar vertebrae and a TLSO brace is recommended  Patient continued to be altered with mumbling speech    Earlier this morning, SLIM PA made critical care aware of his worsening mental status and his possible need for BiPAP  An update was received at the CO2 was 85 and that [facility] was placing the patient on bipap for hypercarbia  Proximally 30-40 minutes later, rapid response was called for hypotension  The patient was found to be nonverbal with minimal movements on BiPAP  Her systolic blood pressure was in the 60s  He was receiving 1 L of IV fluids  It was recommended to SLIM to call the wife again and update her on the patient's worsening mental status  Will leave patient on the medical-surgical floor at this time  Review of Systems   Unable to perform ROS: Mental status change       Objective:   Vitals:    05/03/22 0941 05/03/22 0950 05/03/22 1005 05/03/22 1129   BP:  (!) 81/33 (!) 77/45 (!) 111/42   BP Location:       Pulse: 60 59 59 77   Resp:    16   Temp:       TempSrc:       SpO2: 99%   95%   Weight:       Height:         Physical Exam  Vitals and nursing note reviewed  Constitutional:       Appearance: He is ill-appearing and toxic-appearing  Comments: Obtunded     HENT:      Head: Normocephalic and atraumatic  Right Ear: Tympanic membrane, ear canal and external ear normal       Left Ear: Tympanic membrane, ear canal and external ear normal       Nose: Nose normal       Mouth/Throat:      Mouth: Mucous membranes are dry  Pharynx: Oropharynx is clear  Eyes:      Comments: Bilateral pupils pinpoint, sluggish, reactive     Cardiovascular:      Rate and Rhythm: Normal rate and regular rhythm  Pulses: Normal pulses  Heart sounds: Normal heart sounds  Pulmonary:      Comments: On BiPAP  Bilateral breath sounds diminished  Abdominal:      Comments: Bowel sounds hypoactive  Abdomen soft, nontender   Genitourinary:     Comments: No urine  Musculoskeletal:         General: Normal range of motion  Cervical back: Normal range of motion and neck supple  Skin:     Capillary Refill: Capillary refill takes 2 to 3 seconds  Coloration: Skin is pale        Comments: Cool   Neurological:      Comments: Obtunded  Nonverbal  Does not withdrawal to painful stimuli         Portions of the record may have been created with voice recognition software  Occasional wrong word or "sound a like" substitutions may have occurred due to the inherent limitations of voice recognition software  Read the chart carefully and recognize, using context, where substitutions have occurred      JUAN JOSE Spring

## 2022-05-03 NOTE — ASSESSMENT & PLAN NOTE
Patient had been admitted status post 3 falls at home, sustained an acute fracture of L1  Patient's wife indicated that after the 1st fall the patient demonstrated altered mental status changes, was not redirectable as he normally is  On morning of 5/3 patient was noted to have tachypnea, sonorous breathing  ABG revealed hypercapnia with pCO2 84, pH is 7 02  Discussed with wife at that time and she indicated that she was okay with a trial of BiPAP  Repeat ABG showed improvement with pCO2 47 and pH 7 325  Patient continued to be unresponsive to noxious stimuli  Wife is at bedside, she indicated that she did not wish to pursue further medical treatment as she felt this would not be her 's wishes  She was agreeable for hospice consultation, case management aware  Hospice evaluated the patient indicated that he is appropriate for general inpatient hospice care  Morphine 2 mg IV increased to 4 mg q 3 hours as patient continues with increased work of breathing  IV Ativan p r n  For agitation  Scopolamine patch ordered  Patient  at 2:55 p m  With family at the bedside

## 2022-05-03 NOTE — SPEECH THERAPY NOTE
Pt poorly responsive this am and placed on BIPAP at this time  Plan: NPO, f/u as indicated  12 noon  I spoke with Zac Valladares and was informed that pt transitioning to comfort care/hospicer care ([facility] services requested)  D/C f/u at this time

## 2022-05-03 NOTE — CASE MANAGEMENT
Case Management Progress Note    Patient name Brian Camarillo  Location 2 [facility] 68 218/2 [address] MRN [id number]  : 1929 Date 5/3/2022       LOS (days): 3  Geometric Mean LOS (GMLOS) (days): 4 30  Days to GMLOS:1 3        OBJECTIVE:        Current admission status: Inpatient  [facility]   [place] [address] [facility]   Phone: [phone] Fax: [phone]    Primary Care Provider: Mj Almeida DO    Primary Insurance: MEDICARE  Secondary Insurance: AARP    PROGRESS NOTE:    CM notified family that [address] is coming in today to see pt  The family is visible upset, but thankful for the care  They are looking forward to speaking with the hospice RN

## 2022-05-03 NOTE — [place] UNIVERSAL TRANSFER FORM
[place] UNIV[place] TRANSFER FORM  (ALL ITEMS MUST BE COMPLETED)    1  TRANSFER FROM: Nahomy Downing TO: Hospice     2  DATE OF TRANSFER: 5/3/2022                        TIME OF TRANSFER: 1300    3  PATIENT NAME: Buren Duane,        YOB: 1929                             GENDER: male    4  LANGUAGE:   English    5  PHYSICIAN NAME:  Ross Mcclendon MD                   PHONE: [phone]    6  CODE STATUS: Level 4 - [facility] DNR Attached: Yes    7  :                                      :  Extended Emergency Contact Information  Primary Emergency Contact: Asaf Lopez  Mobile Phone: [phone]  Relation: Spouse  Secondary Emergency Contact: Franny Montaño  Mobile Phone: [phone]  Relation: [address] [place] Representative/Proxy:  Yes           Legal Guardian:  Yes             NAME OF:           HEALTH CARE REPRESENTATIVE/PROXY:                                         OR           LEGAL GUARDIAN, IF NOT :                                               PHONE:  (Day)           (Night)                        (Cell)    8  REASON FOR TRANSFER: (Must include brief medical history and recent changes in physical function or cognition ) Hospice            V/S: /50 (BP Location: Left arm)   Pulse 88   Temp (!) 96 8 °F (36 °C) (Axillary)   Resp 16   Ht 6' 1" (1 854 m)   Wt 75 1 kg (165 lb 9 1 oz)   SpO2 97%   BMI 21 84 kg/m²           PAIN: None    9  PRIMARY DIAGNOSIS: Goals of care, counseling/discussion      Secondary Diagnosis:         Pacemaker: No      Internal Defib: No          Mental Health Diagnosis (if Applicable):    10  RESTRAINTS: No     11  RESPIRATORY NEEDS: None    12  ISOLATION/PRECAUTION: None    13  ALLERGY: Patient has no known allergies  14  SENSORY:       Vision Glasses    15  SKIN CONDITION: No Wounds    16   DIET: Special (describe)Diabetic diet nectar thicken liquids    17  IV ACCESS: Saline Lock    18  PERSONAL ITEMS SENT WITH PATIENT: Glasses    19  ATTACHED DOCUMENTS: MUST ATTACH CURRENT MEDICATION INFORMATION Face Sheet, MAR, Medication Reconciliation, TAR and POS    20  AT RISK ALERTS:Falls        HARM TO: N/A    21  WEIGHT BEARING STATUS:         Left Leg: None        Right Leg: None    22  MENTAL STATUS:Oriented    23  FUNCTION:        Walk: Not Able        Transfer: Not Able        Toilet: Not Able        Feed: Not Able    24  IMMUNIZATIONS/SCREENING:     Immunization History   Administered Date(s) Administered    COVID-19 MODERNA VACC 0 5 ML IM 02/12/2021, 03/11/2021    Tdap 12/11/2021       25  BOWEL: Incontinent  and Date Last BM5/1/22    26  BLADDER: Incontinent    27   SENDING FACILITY CONTACT: [facility]                  Title: RN        Unit: 2S        Phone: [phone] [address] (if known):        Title:        Unit:         Phone:         FORM PREFILLED BY (if applicable)       Title:       Unit:        Phone:         FORM COMPLETED BY Cirilo Jacobson RN      Title: RENETTA      Phone: [phone]

## 2022-05-03 NOTE — ASSESSMENT & PLAN NOTE
· Wife reports 6 months history of declining mental status/ memory which waxes and wanes and he has spells" of confusion, lethargy, weakness which usually resolves in 1 day  He has been worked up multiple times for this  It sounds like a metabolic encephalopathy, dementia progression according to the way the wife describes it  · Juanley underlying dementia which is never formally diagnosed  and now with acute pain, HTN and hospitalization he is delirious  · Restraints placed night of 5/1  · Started seroquel 25 mg qhs   · Psych consulted; follow-up on results  5/2 spoke at length with wife at bedside, she indicated that the patient has never been this delirious before  Wife indicated that patient's speech is normally clear, able to eat and drink without any difficulties, ambulates with a walker at home and interacts with family appropriately  CT scan of head performed when patient came in showed chronic lacunar infarctions, no change from previous CT  Microangiopathic changes  Worsening right maxillory-sinus disease  Ordered ceftriaxone for right maxillory- sinus disease      Ammonia level ordered, less than 10  ABG within normal limits  MRI of brain  showed no acute infarct    5/3 patient noted to be unresponsive to noxious stimuli, repeat ABG showed pCO2 84  As noted above discussed with wife okay for trial with BiPAP, ultimately wife decided that she does not wish to pursue medical treatment and focus on comfort  Hospice consulted, patient appropriate for in hospice care

## 2022-05-03 NOTE — TREATMENT PLAN
Patient has transition to comfort care, no further neurosurgical recommendations at this time  Neurosurgery will sign off, call with any further questions or concerns

## 2022-05-03 NOTE — ASSESSMENT & PLAN NOTE
Hypercapnia as evidence by increased work of breathing, respiratory rate 30  ABG this morning showed pCO2 of 84  Previous ABG performed 5/2 showed pCO2 of 37  As noted above did a trial of BiPAP after discussion with wife, ultimately wife determined that she wanted to pursue comfort measures only  Patient currently is on room air with O2 sat 97%  Continues unresponsive  Hospice consulted, patient is appropriate for inpatient hospice care

## 2022-05-03 NOTE — ASSESSMENT & PLAN NOTE
As noted above goals of care discussion was performed with patient's wife at the bedside  She wants the focus to be on comfort as she feels that her  would not want medical treatment if he was not going to be able to get back to his baseline  She is agreeable to consultation with [facility] [address]  Case management aware, liaison from [facility] met with patient's wife and son at the bedside  They indicated that patient is appropriate for general inpatient hospice care  Plan as noted above  Spiritual care consulted  Supportive care for family  Patient  at 2:55 p m   With family at bedside

## 2022-05-03 NOTE — ASSESSMENT & PLAN NOTE
Patient continues with altered mental status changes  CT of head negative for any acute intracranial abnormalities, MRI was performed on 5/2, no acute infarct  Patient remains unresponsive to noxious stimuli  As noted above discussion with patient's wife at the bedside indicated that she has noticed a decline in her 's abilities over the past year, was having periods of staring out into space, "not himself  As noted above patient's wife indicated that she does not wish to pursue any further medical treatment and would prefer to focus on comfort care/hospice care

## 2022-05-05 LAB
BACTERIA BLD CULT: NORMAL
BACTERIA BLD CULT: NORMAL

## 2022-09-18 NOTE — ASSESSMENT & PLAN NOTE
BP initially was elevated, currently hypotensive did receive IV bolus with minimal improvement  As noted above goals of care was discussed with patient's wife at the bedside, focus on comfort care and no further medical management  Hospice consulted, appropriate for inpatient hospice  Check B12  Continue supplements

## 2023-10-31 NOTE — ASSESSMENT & PLAN NOTE
Pt called requesting prescription for antifungal for yeast infection around abdominal wound. Informed her that since we have not seen her in clinic since surgery, we cannot do that without assessment. Appt rescheduled to Thursday per patient request.    bp elevated, likely in part due to pain  - restart home bp medications  - prn hydralazine/labetalol  Blood pressure improved